# Patient Record
Sex: FEMALE | HISPANIC OR LATINO | Employment: FULL TIME | URBAN - METROPOLITAN AREA
[De-identification: names, ages, dates, MRNs, and addresses within clinical notes are randomized per-mention and may not be internally consistent; named-entity substitution may affect disease eponyms.]

---

## 2017-03-09 ENCOUNTER — HOSPITAL ENCOUNTER (OUTPATIENT)
Dept: LAB | Facility: MEDICAL CENTER | Age: 47
End: 2017-03-09
Attending: INTERNAL MEDICINE
Payer: COMMERCIAL

## 2017-03-09 LAB
25(OH)D3 SERPL-MCNC: 34 NG/ML (ref 30–100)
T4 FREE SERPL-MCNC: 0.99 NG/DL (ref 0.53–1.43)
TSH SERPL DL<=0.005 MIU/L-ACNC: 2.79 UIU/ML (ref 0.3–3.7)
VIT B12 SERPL-MCNC: >1500 PG/ML (ref 211–911)

## 2017-03-09 PROCEDURE — 36415 COLL VENOUS BLD VENIPUNCTURE: CPT

## 2017-03-09 PROCEDURE — 82306 VITAMIN D 25 HYDROXY: CPT

## 2017-03-09 PROCEDURE — 84439 ASSAY OF FREE THYROXINE: CPT

## 2017-03-09 PROCEDURE — 82607 VITAMIN B-12: CPT

## 2017-03-09 PROCEDURE — 84443 ASSAY THYROID STIM HORMONE: CPT

## 2017-04-27 ENCOUNTER — OFFICE VISIT (OUTPATIENT)
Dept: URGENT CARE | Facility: PHYSICIAN GROUP | Age: 47
End: 2017-04-27
Payer: COMMERCIAL

## 2017-04-27 ENCOUNTER — HOSPITAL ENCOUNTER (OUTPATIENT)
Facility: MEDICAL CENTER | Age: 47
End: 2017-04-27
Attending: PHYSICIAN ASSISTANT
Payer: COMMERCIAL

## 2017-04-27 ENCOUNTER — APPOINTMENT (OUTPATIENT)
Dept: RADIOLOGY | Facility: IMAGING CENTER | Age: 47
End: 2017-04-27
Attending: PHYSICIAN ASSISTANT
Payer: COMMERCIAL

## 2017-04-27 VITALS
SYSTOLIC BLOOD PRESSURE: 116 MMHG | RESPIRATION RATE: 16 BRPM | HEIGHT: 68 IN | BODY MASS INDEX: 27.43 KG/M2 | WEIGHT: 181 LBS | OXYGEN SATURATION: 98 % | HEART RATE: 70 BPM | TEMPERATURE: 97.9 F | DIASTOLIC BLOOD PRESSURE: 70 MMHG

## 2017-04-27 DIAGNOSIS — M54.6 ACUTE THORACIC BACK PAIN, UNSPECIFIED BACK PAIN LATERALITY: ICD-10-CM

## 2017-04-27 DIAGNOSIS — R06.02 SHORTNESS OF BREATH: ICD-10-CM

## 2017-04-27 DIAGNOSIS — R13.10 DYSPHAGIA, UNSPECIFIED TYPE: ICD-10-CM

## 2017-04-27 LAB
ALBUMIN SERPL BCP-MCNC: 4.3 G/DL (ref 3.2–4.9)
ALBUMIN/GLOB SERPL: 1.3 G/DL
ALP SERPL-CCNC: 69 U/L (ref 30–99)
ALT SERPL-CCNC: 14 U/L (ref 2–50)
ANION GAP SERPL CALC-SCNC: 8 MMOL/L (ref 0–11.9)
APPEARANCE UR: CLEAR
AST SERPL-CCNC: 16 U/L (ref 12–45)
BASOPHILS # BLD AUTO: 0.6 % (ref 0–1.8)
BASOPHILS # BLD: 0.04 K/UL (ref 0–0.12)
BILIRUB SERPL-MCNC: 0.4 MG/DL (ref 0.1–1.5)
BILIRUB UR STRIP-MCNC: NORMAL MG/DL
BUN SERPL-MCNC: 9 MG/DL (ref 8–22)
CALCIUM SERPL-MCNC: 9.4 MG/DL (ref 8.5–10.5)
CHLORIDE SERPL-SCNC: 105 MMOL/L (ref 96–112)
CO2 SERPL-SCNC: 25 MMOL/L (ref 20–33)
COLOR UR AUTO: YELLOW
CREAT SERPL-MCNC: 0.53 MG/DL (ref 0.5–1.4)
EOSINOPHIL # BLD AUTO: 0.11 K/UL (ref 0–0.51)
EOSINOPHIL NFR BLD: 1.6 % (ref 0–6.9)
ERYTHROCYTE [DISTWIDTH] IN BLOOD BY AUTOMATED COUNT: 39.3 FL (ref 35.9–50)
GFR SERPL CREATININE-BSD FRML MDRD: >60 ML/MIN/1.73 M 2
GLOBULIN SER CALC-MCNC: 3.4 G/DL (ref 1.9–3.5)
GLUCOSE SERPL-MCNC: 91 MG/DL (ref 65–99)
GLUCOSE UR STRIP.AUTO-MCNC: NORMAL MG/DL
HCT VFR BLD AUTO: 41.2 % (ref 37–47)
HGB BLD-MCNC: 13.8 G/DL (ref 12–16)
IMM GRANULOCYTES # BLD AUTO: 0.01 K/UL (ref 0–0.11)
IMM GRANULOCYTES NFR BLD AUTO: 0.1 % (ref 0–0.9)
KETONES UR STRIP.AUTO-MCNC: NORMAL MG/DL
LEUKOCYTE ESTERASE UR QL STRIP.AUTO: NORMAL
LYMPHOCYTES # BLD AUTO: 2.64 K/UL (ref 1–4.8)
LYMPHOCYTES NFR BLD: 37.6 % (ref 22–41)
MCH RBC QN AUTO: 29.7 PG (ref 27–33)
MCHC RBC AUTO-ENTMCNC: 33.5 G/DL (ref 33.6–35)
MCV RBC AUTO: 88.6 FL (ref 81.4–97.8)
MONOCYTES # BLD AUTO: 0.44 K/UL (ref 0–0.85)
MONOCYTES NFR BLD AUTO: 6.3 % (ref 0–13.4)
NEUTROPHILS # BLD AUTO: 3.78 K/UL (ref 2–7.15)
NEUTROPHILS NFR BLD: 53.8 % (ref 44–72)
NITRITE UR QL STRIP.AUTO: NORMAL
NRBC # BLD AUTO: 0 K/UL
NRBC BLD AUTO-RTO: 0 /100 WBC
PH UR STRIP.AUTO: 6.5 [PH] (ref 5–8)
PLATELET # BLD AUTO: 274 K/UL (ref 164–446)
PMV BLD AUTO: 11.2 FL (ref 9–12.9)
POTASSIUM SERPL-SCNC: 3.8 MMOL/L (ref 3.6–5.5)
PROT SERPL-MCNC: 7.7 G/DL (ref 6–8.2)
PROT UR QL STRIP: NORMAL MG/DL
RBC # BLD AUTO: 4.65 M/UL (ref 4.2–5.4)
RBC UR QL AUTO: NORMAL
SODIUM SERPL-SCNC: 138 MMOL/L (ref 135–145)
SP GR UR STRIP.AUTO: 1
TSH SERPL DL<=0.005 MIU/L-ACNC: 0.64 UIU/ML (ref 0.3–3.7)
UROBILINOGEN UR STRIP-MCNC: NORMAL MG/DL
WBC # BLD AUTO: 7 K/UL (ref 4.8–10.8)

## 2017-04-27 PROCEDURE — 80053 COMPREHEN METABOLIC PANEL: CPT

## 2017-04-27 PROCEDURE — 84443 ASSAY THYROID STIM HORMONE: CPT

## 2017-04-27 PROCEDURE — 85025 COMPLETE CBC W/AUTO DIFF WBC: CPT

## 2017-04-27 PROCEDURE — 99000 SPECIMEN HANDLING OFFICE-LAB: CPT | Performed by: PHYSICIAN ASSISTANT

## 2017-04-27 PROCEDURE — 99214 OFFICE O/P EST MOD 30 MIN: CPT | Performed by: PHYSICIAN ASSISTANT

## 2017-04-27 PROCEDURE — 71020 DX-CHEST-2 VIEWS: CPT | Mod: TC | Performed by: PHYSICIAN ASSISTANT

## 2017-04-27 PROCEDURE — 81002 URINALYSIS NONAUTO W/O SCOPE: CPT | Performed by: PHYSICIAN ASSISTANT

## 2017-04-27 PROCEDURE — 93000 ELECTROCARDIOGRAM COMPLETE: CPT | Performed by: PHYSICIAN ASSISTANT

## 2017-04-27 PROCEDURE — 36415 COLL VENOUS BLD VENIPUNCTURE: CPT | Performed by: PHYSICIAN ASSISTANT

## 2017-04-27 RX ORDER — CYCLOBENZAPRINE HCL 10 MG
10 TABLET ORAL 3 TIMES DAILY PRN
Qty: 30 TAB | Refills: 0 | Status: SHIPPED | OUTPATIENT
Start: 2017-04-27 | End: 2017-09-13

## 2017-04-27 ASSESSMENT — ENCOUNTER SYMPTOMS
SPUTUM PRODUCTION: 0
PALPITATIONS: 0
DIZZINESS: 1
SORE THROAT: 0
COUGH: 0
BACK PAIN: 1
HEMOPTYSIS: 0
WHEEZING: 0
FEVER: 0
CHILLS: 0
SHORTNESS OF BREATH: 1

## 2017-04-27 NOTE — Clinical Note
April 27, 2017         Patient: Hortencia Bonilla   YOB: 1970   Date of Visit: 4/27/2017           To Whom it May Concern:    Hortencia Bonilla was seen in my clinic on 4/27/2017. She is recovering from a back injury and would benefit from less strenuous work as she heals.    If you have any questions or concerns, please don't hesitate to call.        Sincerely,           Jeff Sena PA-C  Electronically Signed

## 2017-04-27 NOTE — MR AVS SNAPSHOT
"        Hortencia Bonilla   2017 2:45 PM   Office Visit   MRN: [id number]    Department:  [facility]   Dept Phone:  [phone]    Description:  Female : 1970   Provider:  Jeff Sena PA-C           Reason for Visit     Shortness of Breath light headed, shortness of breath and pt states she feels like she is going to faint, shaky; no chest pain but has had increased [id number] pt states middle of back hurts when breathing      Allergies as of 2017     Allergen Noted Reactions    Bloodless 2011         You were diagnosed with     Acute thoracic back pain, unspecified back pain laterality   [[id number]]       Dysphagia, unspecified type   [[id number]]         Vital Signs     Blood Pressure Pulse Temperature Respirations Height Weight    116/70 mmHg 70 36.6 °C (97.9 °F) 16 1.727 m (5' 8\") 82.101 kg (181 lb)    Body Mass Index Oxygen Saturation Smoking Status             27.53 kg/m2 98% Never Smoker          Basic Information     Date Of Birth Sex Race Ethnicity Preferred Language    1970 Female  or   Origin (Puerto Rican,Greek,Botswanan,North Korean, etc) English      Problem List              ICD-10-CM Priority Class Noted - Resolved    Diarrhea of presumed infectious origin A09 High  2011 - Present      Health Maintenance        Date Due Completion Dates    IMM DTaP/Tdap/Td Vaccine (1 - Tdap) 8/3/1987 ---    PAP SMEAR 10/21/2014 10/21/2011    MAMMOGRAM 2015            Results     POCT Urinalysis      Component Value Standard Range & Units    POC Color yellow Negative    POC Appearance clear Negative    POC Leukocyte Esterase trace Negative    POC Nitrites neg Negative    POC Urobiligen neg Negative (0.2) mg/dL    POC Protein neg Negative mg/dL    POC Urine PH 6.5 5.0 - 8.0    POC Blood neg Negative    POC Specific Gravity 1.000 <1.005 - >1.030    POC Ketones neg Negative mg/dL    POC Biliruben neg Negative mg/dL    POC Glucose neg Negative mg/dL         "                  Current Immunizations     No immunizations on file.      Below and/or attached are the medications your provider expects you to take. Review all of your home medications and newly ordered medications with your provider and/or pharmacist. Follow medication instructions as directed by your provider and/or pharmacist. Please keep your medication list with you and share with your provider. Update the information when medications are discontinued, doses are changed, or new medications (including over-the-counter products) are added; and carry medication information at all times in the event of emergency situations     Allergies:  BLOODLESS - (reactions not documented)               Medications  Valid as of: April 27, 2017 -  5:01 PM    Generic Name Brand Name Tablet Size Instructions for use    Amoxicillin-Pot Clavulanate (Tab) AUGMENTIN 875-125 MG Take 1 Tab by mouth 2 times a day.        Cyclobenzaprine HCl (Tab) FLEXERIL 10 MG Take 1 Tab by mouth 3 times a day as needed for Moderate Pain.        DM-Phenylephrine-Acetaminophen   Take  by mouth.        Omega-3 Fatty Acids   Take  by mouth.        Phenazopyridine HCl (Tab) PYRIDIUM 200 MG Take 1 Tab by mouth 3 times a day as needed.        .                 Medicines prescribed today were sent to:     [id number] - [place] - [address] [place] [address]    Phone: [phone] Fax: [phone]    Open 24 Hours?: No      Medication refill instructions:       If your prescription bottle indicates you have medication refills left, it is not necessary to call your provider’s office. Please contact your pharmacy and they will refill your medication.    If your prescription bottle indicates you do not have any refills left, you may request refills at any time through one of the following ways: The online [employer] system (except Urgent Care), by calling your provider’s office, or by asking your pharmacy to contact your provider’s office  with a refill request. Medication refills are processed only during regular business hours and may not be available until the next business day. Your provider may request additional information or to have a follow-up visit with you prior to refilling your medication.   *Please Note: Medication refills are assigned a new Rx number when refilled electronically. Your pharmacy may indicate that no refills were authorized even though a new prescription for the same medication is available at the pharmacy. Please request the medicine by name with the pharmacy before contacting your provider for a refill.        Your To Do List     Future Labs/Procedures Complete By Expires    CBC WITH DIFFERENTIAL  As directed 4/27/2018    COMP METABOLIC PANEL  As directed 4/27/2018    DX-CHEST-2 VIEWS  As directed 4/27/2018    TSH  As directed 4/27/2018      Referral     A referral request has been sent to our patient care coordination department. Please allow 3-5 business days for us to process this request and contact you either by phone or mail. If you do not hear from us by the 5th business day, please call us at [phone].        Instructions    Disfagia  (Dysphagia)  Los problemas para deglutir se perciben cuando los sólidos y líquidos parecen quedarse adheridos en la garganta en santana pasaje hacia el estómago, o cuando los alimentos tardan demasiado tiempo en llegar al estómago. Otros síntomas (problemas) incluyen la regurgitación (eructos) de la comida, ruidos que provienen de la garganta, molestias en el pecho al tragar, o la sensación de alimentos atorados al deglutir. Cuando la obstrucción de la garganta es completa, puede asociarse a salivación intensa.  CAUSAS  La dificultad para deglutir puede tener muchas causas, y lo que sigue es jose d información generalizada con respecto a los orígenes que puede tener courtney problema. Puede producirse por trastornos musculares. El alimento no puede ser impulsado correctamente hacia el  estómago. Puede priscilla úlceras, tejidos cicatrizales o inflamación (irritación) en el esófago (el tubo que lleva los alimentos desde la boca hasta el estómago) que impiden el pasaje normal hacia el estómago. Entre las causas de la inflamación se incluye la causada por el reflujo de ácido del estómago hacia el esófago. Otras causas pueden ser el virus del herpes simple, cándida (hongos), radiación (celeste en el tratamiento del cáncer) o inflamación por medicamentos que no se hancock ingerido con la cantidad suficiente de líquido para que los arrastre hasta el estómago. Puede priscilla problemas nerviosos, en cuyo yissel no se envían adecuadamente las señales a los músculos del esófago para contraerse y movilizar los alimentos. La acalasia es un trastorno del esófago poco frecuente, en el que se produce jose d falta de coordinación de las contracciones musculares. El globo histérico es un problema relativamente común en mujeres jóvenes, que consiste en la sensación de ojse d obstrucción o dificultad para tragar, kan no se encuentran anormalidades. Courtney trastorno generalmente mejora con el tiempo; es necesario transmitir confianza a la paciente y realizar pruebas para descartar otras causas.  DIAGNÓSTICO  Existen algunas pruebas que ayudarán al profesional que lo asiste a conocer la causa de jose dificultades para deglutir. Entre estas pruebas se incluye un estudio de la deglución utilizando bario, en el que se alvina radiografías mientras se kera un líquido que delinea el recubrimiento del esófago. Si el estómago y el intestino hoffman también se estudian de courtney modo, el procedimiento se denomina examen del tracto digestivo alto. Podrán someterlo a jose d endoscopia en la que el profesional examina la garganta, el esófago, el estómago y el intestino hoffman con un instrumento similar a un pequeño telescopio flexible. También podrán realizarle estudios de motilidad, en los que se mide la efectividad y la coordinación de las contracciones  musculares del esófago.  TRATAMIENTO  Los tratamientos de los problemas de la deglución son muchos y varían desde la administración de medicamentos hasta el tratamiento quirúrgico. El tratamiento implementado depende del tipo de problema que se encuentre. El profesional que lo asiste discutirá con usted los resultados y el tratamiento a seguir. Si los problemas para deglutir son graves a tadeo plazo, podrán ocasionarle: desnutrición, neumonía (porque el alimento se dirige hacia la tráquea y los bronquios, que son los conductos respiratorios) y a un aumento de la posibilidad de sufrir tumores (bultos) en el esófago.  SOLICITE ATENCIÓN MÉDICA DE INMEDIATO SI:  · Un trozo de alimento u otro objeto se estancan en la garganta o en el esófago y no puede movilizarlos.  Document Released: 09/27/2006 Document Revised: 03/11/2013  ExitAdvanced Image Enhancement® Patient Information ©2014 [employer].  Dolor de espalda en adultos  (Back Pain, Adult)  El dolor de espalda es muy frecuente en los adultos. La causa del dolor de espalda es bisi vez peligrosa y el dolor a menudo mejora con el tiempo. Es posible que se desconozca la causa de esta afección. Algunas causas comunes son las siguientes:  · Distensión de los músculos o ligamentos que sostienen la columna vertebral.  · Desgaste (degeneración) de los discos vertebrales.  · Artritis.  · Lesiones directas en la espalda.  En muchas personas, el dolor de espalda es recurrente. [place] bisi vez es peligroso, las personas pueden aprender a manejar esta afección por sí mismas.  INSTRUCCIONES PARA EL CUIDADO EN EL HOGAR  Controle santana dolor de espalda a fin de detectar algún cambio. Las siguientes indicaciones ayudarán a aliviar cualquier molestia que pueda sentir:  · Permanezca activo. Si permanece sentado o de pie en un mismo lugar yovanny mucho tiempo, se tensiona la espalda. No se siente, conduzca o permanezca de pie en un mismo lugar yovanny más de 30 minutos seguidos. Realice caminatas cortas en  superficies mp tan pronto celeste le sea posible. Trate de caminar un poco más de tiempo cada día.  · Yusef ejercicio regularmente celeste se lo haya indicado el médico. El ejercicio ayuda a que santana espalda se cure más rápidamente. También ayuda a prevenir futuras lesiones al mantener los músculos david y flexibles.  · No permanezca en la cama. Si hace reposo más de 1 a 2 días, puede demorar santana recuperación.  · Preste atención a santana cuerpo al inclinarse y levantarse. Las posiciones más cómodas son las que ejercen menos tensión en la espalda en recuperación. Siempre use técnicas apropiadas para levantar objetos, celeste por ejemplo:  ¨ Flexionar las rodillas.  ¨ Mantener la carga cerca del cuerpo.  ¨ No torcerse.  · Encuentre jose d posición cómoda para dormir. Use un colchón firme y recuéstese de costado con las rodillas ligeramente flexionadas. Si se recuesta sobre la espalda, coloque jose d almohada debajo de las rodillas.  · Evite sentir ansiedad o estrés. El estrés aumenta la tensión muscular y puede empeorar el dolor de espalda. Es importante reconocer si se siente ansioso o estresado y aprender maneras de controlarlo, por ejemplo haciendo ejercicio.  · [place] los medicamentos solamente celeste se lo haya indicado el médico. Los medicamentos de venta chidi para aliviar el dolor y la inflamación a menudo son los más eficaces. El médico puede recetarle relajantes musculares. Estos medicamentos ayudan a calmar el dolor de modo que pueda reanudar más rápidamente jose actividades normales y el ejercicio saludable.  · Aplique hielo sobre la shanelle lesionada.  ¨ Ponga el hielo en jose d bolsa plástica.  ¨ Coloque jose d toalla entre la piel y la bolsa de hielo.  ¨ Deje el hielo yovanny 20 minutos, 2 a 3 veces por día, yovanny los primeros 2 o 3 días. Después de eso, puede alternar el hielo y el calor para reducir el dolor y los espasmos.  · Mantenga un peso saludable. El exceso de peso ejerce presión adicional sobre la espalda y hace que  resulte difícil mantener jose d buena postura.  SOLICITE ATENCIÓN MÉDICA SI:  · Siente un dolor que no se adam con reposo o medicamentos.  · Siente mucho dolor que se extiende a las piernas o los glúteos.  · El dolor no mejora en jose d semana.  · Siente dolor por la noche.  · Pierde peso.  · Siente escalofríos o fiebre.  SOLICITE ATENCIÓN MÉDICA DE INMEDIATO SI:   · Tiene nuevos problemas para controlar la vejiga o los intestinos.  · Siente debilidad o adormecimiento inusuales en los brazos o en las piernas.  · Siente náuseas o vómitos.  · Siente dolor abdominal.  · Siente que va a desmayarse.     Esta información no tiene celeste fin reemplazar el consejo del médico. Asegúrese de hacerle al médico cualquier pregunta que tenga.     Document Released: 12/18/2006 Document Revised: 01/08/2016  [employer] Interactive Patient Education ©2016 Elsevier Inc.            Zeta Interactive Access Code: [email or web address]  Expires: 5/27/2017 12:56 PM    Zeta Interactive  A secure, online tool to manage your health information     [employer] Zeta Interactive® is a secure, online tool that connects you to your personalized health information from the privacy of your home -- day or night - making it very easy for you to manage your healthcare. Once the activation process is completed, you can even access your medical information using the Zeta Interactive erica, which is available for free in the [employer]a store or Google Play store.     Z[employer] Interactive provides the following levels of access (as shown below):   My Chart Features   [facility] Doctor [facility]  Specialists [facility]  Urgent  Care [facility]  Primary Care  Doctor   Email your healthcare team securely and privately 24/7 X X X    Manage appointments: schedule your next appointment; view details of past/upcoming appointments X      Request prescription refills. X      View recent personal medical records, including lab and immunizations X X X X   View health record, including health history, allergies, medications X  X X X   Read reports about your outpatient visits, procedures, consult and ER notes X X X X   See your discharge summary, which is a recap of your hospital and/or ER visit that includes your diagnosis, lab results, and care plan. X X       How to register for [employer]:  1. Go to  [email or web address].  2. Click on the Sign Up Now box, which takes you to the New Member Sign Up page. You will need to provide the following information:  a. Enter your PetLove Access Code exactly as it appears at the top of this page. (You will not need to use this code after you’ve completed the sign-up process. If you do not sign up before the expiration date, you must request a new code.)   b. Enter your date of birth.   c. Enter your home email address.   d. Click Submit, and follow the next screen’s instructions.  3. Create a PetLove ID. This will be your PetLove login ID and cannot be changed, so think of one that is secure and easy to remember.  4. Create a PetLove password. You can change your password at any time.  5. Enter your Password Reset Question and Answer. This can be used at a later time if you forget your password.   6. Enter your e-mail address. This allows you to receive e-mail notifications when new information is available in PetLove.  7. Click Sign Up. You can now view your health information.    For assistance activating your PetLove account, call [phone]

## 2017-04-27 NOTE — PATIENT INSTRUCTIONS
Disfagia  (Dysphagia)  Los problemas para deglutir se perciben cuando los sólidos y líquidos parecen quedarse adheridos en la garganta en santana pasaje hacia el estómago, o cuando los alimentos tardan demasiado tiempo en llegar al estómago. Otros síntomas (problemas) incluyen la regurgitación (eructos) de la comida, ruidos que provienen de la garganta, molestias en el pecho al tragar, o la sensación de alimentos atorados al deglutir. Cuando la obstrucción de la garganta es completa, puede asociarse a salivación intensa.  CAUSAS  La dificultad para deglutir puede tener muchas causas, y lo que sigue es jose d información generalizada con respecto a los orígenes que puede tener courtney problema. Puede producirse por trastornos musculares. El alimento no puede ser impulsado correctamente hacia el estómago. Puede priscilla úlceras, tejidos cicatrizales o inflamación (irritación) en el esófago (el tubo que lleva los alimentos desde la boca hasta el estómago) que impiden el pasaje normal hacia el estómago. Entre las causas de la inflamación se incluye la causada por el reflujo de ácido del estómago hacia el esófago. Otras causas pueden ser el virus del herpes simple, cándida (hongos), radiación (celeste en el tratamiento del cáncer) o inflamación por medicamentos que no se hancock ingerido con la cantidad suficiente de líquido para que los arrastre hasta el estómago. Puede priscilla problemas nerviosos, en cuyo yissel no se envían adecuadamente las señales a los músculos del esófago para contraerse y movilizar los alimentos. La acalasia es un trastorno del esófago poco frecuente, en el que se produce jose d falta de coordinación de las contracciones musculares. El globo histérico es un problema relativamente común en mujeres jóvenes, que consiste en la sensación de jose d obstrucción o dificultad para tragar, kan no se encuentran anormalidades. [place] trastorno generalmente mejora con el tiempo; es necesario transmitir confianza a la paciente y realizar  pruebas para descartar otras causas.  DIAGNÓSTICO  Existen algunas pruebas que ayudarán al profesional que lo asiste a conocer la causa de jose dificultades para deglutir. Entre estas pruebas se incluye un estudio de la deglución utilizando bario, en el que se alvina radiografías mientras se kera un líquido que delinea el recubrimiento del esófago. Si el estómago y el intestino hoffman también se estudian de courtney modo, el procedimiento se denomina examen del tracto digestivo alto. Podrán someterlo a jose d endoscopia en la que el profesional examina la garganta, el esófago, el estómago y el intestino hoffman con un instrumento similar a un pequeño telescopio flexible. También podrán realizarle estudios de motilidad, en los que se mide la efectividad y la coordinación de las contracciones musculares del esófago.  TRATAMIENTO  Los tratamientos de los problemas de la deglución son muchos y varían desde la administración de medicamentos hasta el tratamiento quirúrgico. El tratamiento implementado depende del tipo de problema que se encuentre. El profesional que lo asiste discutirá con usted los resultados y el tratamiento a seguir. Si los problemas para deglutir son graves a tadeo plazo, podrán ocasionarle: desnutrición, neumonía (porque el alimento se dirige hacia la tráquea y los bronquios, que son los conductos respiratorios) y a un aumento de la posibilidad de sufrir tumores (bultos) en el esófago.  SOLICITE ATENCIÓN MÉDICA DE INMEDIATO SI:  · Un trozo de alimento u otro objeto se estancan en la garganta o en el esófago y no puede movilizarlos.  Document Released: 09/27/2006 Document Revised: 03/11/2013  ExitCare® Patient Information ©2014 [employer], [place].  Dolor de espalda en adultos  (Back Pain, Adult)  El dolor de espalda es muy frecuente en los adultos. La causa del dolor de espalda es bisi vez peligrosa y el dolor a menudo mejora con el tiempo. Es posible que se desconozca la causa de esta afección. Algunas causas  comunes son las siguientes:  · Distensión de los músculos o ligamentos que sostienen la columna vertebral.  · Desgaste (degeneración) de los discos vertebrales.  · Artritis.  · Lesiones directas en la espalda.  En muchas personas, el dolor de espalda es recurrente. [place] bisi vez es peligroso, las personas pueden aprender a manejar esta afección por sí mismas.  INSTRUCCIONES PARA EL CUIDADO EN EL HOGAR  Controle santana dolor de espalda a fin de detectar algún cambio. Las siguientes indicaciones ayudarán a aliviar cualquier molestia que pueda sentir:  · Permanezca activo. Si permanece sentado o de pie en un mismo lugar yovanny mucho tiempo, se tensiona la espalda. No se siente, conduzca o permanezca de pie en un mismo lugar yovanny más de 30 minutos seguidos. Realice caminatas cortas en superficies mp tan pronto celeste le sea posible. Trate de caminar un poco más de tiempo cada día.  · Yusef ejercicio regularmente celeste se lo haya indicado el médico. El ejercicio ayuda a que santana espalda se cure más rápidamente. También ayuda a prevenir futuras lesiones al mantener los músculos david y flexibles.  · No permanezca en la cama. Si hace reposo más de 1 a 2 días, puede demorar santana recuperación.  · Preste atención a santana cuerpo al inclinarse y levantarse. Las posiciones más cómodas son las que ejercen menos tensión en la espalda en recuperación. Siempre use técnicas apropiadas para levantar objetos, celeste por ejemplo:  ¨ Flexionar las rodillas.  ¨ Mantener la carga cerca del cuerpo.  ¨ No torcerse.  · Encuentre jose d posición cómoda para dormir. Use un colchón firme y recuéstese de costado con las rodillas ligeramente flexionadas. Si se recuesta sobre la espalda, coloque jose d almohada debajo de las rodillas.  · Evite sentir ansiedad o estrés. El estrés aumenta la tensión muscular y puede empeorar el dolor de espalda. Es importante reconocer si se siente ansioso o estresado y aprender maneras de controlarlo, por ejemplo haciendo  ejercicio.  · [place] los medicamentos solamente celeste se lo haya indicado el médico. Los medicamentos de venta chidi para aliviar el dolor y la inflamación a menudo son los más eficaces. El médico puede recetarle relajantes musculares. Estos medicamentos ayudan a calmar el dolor de modo que pueda reanudar más rápidamente jose actividades normales y el ejercicio saludable.  · Aplique hielo sobre la shanelle lesionada.  ¨ Ponga el hielo en jose d bolsa plástica.  ¨ Coloque jose d toalla entre la piel y la bolsa de hielo.  ¨ Deje el hielo yovanny 20 minutos, 2 a 3 veces por día, yovanny los primeros 2 o 3 días. Después de eso, puede alternar el hielo y el calor para reducir el dolor y los espasmos.  · Mantenga un peso saludable. El exceso de peso ejerce presión adicional sobre la espalda y hace que resulte difícil mantener jose d buena postura.  SOLICITE ATENCIÓN MÉDICA SI:  · Siente un dolor que no se adam con reposo o medicamentos.  · Siente mucho dolor que se extiende a las piernas o los glúteos.  · El dolor no mejora en jose d semana.  · Siente dolor por la noche.  · Pierde peso.  · Siente escalofríos o fiebre.  SOLICITE ATENCIÓN MÉDICA DE INMEDIATO SI:   · Tiene nuevos problemas para controlar la vejiga o los intestinos.  · Siente debilidad o adormecimiento inusuales en los brazos o en las piernas.  · Siente náuseas o vómitos.  · Siente dolor abdominal.  · Siente que va a desmayarse.     Esta información no tiene celeste fin reemplazar el consejo del médico. Asegúrese de hacerle al médico cualquier pregunta que tenga.     Document Released: 12/18/2006 Document Revised: 01/08/2016  Elsevier Interactive Patient Education ©2016 Elsevier Inc.

## 2017-04-28 NOTE — PROGRESS NOTES
Subjective:      Hortencia Bonilla is a 46 y.o. female who presents with Shortness of Breath            Shortness of Breath  This is a new problem. The current episode started yesterday. The problem occurs intermittently. The problem has been unchanged. Pertinent negatives include no chest pain, ear pain, fever, hemoptysis, sore throat, sputum production or wheezing. The symptoms are aggravated by lying flat (deep breaths). Associated symptoms comments: Back pain  . The patient has no known risk factors for DVT/PE. She has tried nothing for the symptoms.       Review of Systems   Constitutional: Positive for malaise/fatigue. Negative for fever and chills.   HENT: Negative for congestion, ear pain and sore throat.    Respiratory: Positive for shortness of breath. Negative for cough, hemoptysis, sputum production and wheezing.    Cardiovascular: Negative for chest pain and palpitations.   Gastrointestinal:        Difficult swallowing.   Musculoskeletal: Positive for back pain.   Neurological: Positive for dizziness.     All other systems reviewed and are negative.  PMH:  has a past medical history of Bipolar disorder (CMS-[facility]) and Thyroid nodule.  MEDS:   Current outpatient prescriptions:   •  cyclobenzaprine (FLEXERIL) 10 MG Tab, Take 1 Tab by mouth 3 times a day as needed for Moderate Pain., Disp: 30 Tab, Rfl: 0  •  DM-Phenylephrine-Acetaminophen (VICKS DAYQUIL MULTI-SYMPTOM PO), Take  by mouth., Disp: , Rfl:   •  amoxicillin-clavulanate (AUGMENTIN) 875-125 MG Tab, Take 1 Tab by mouth 2 times a day., Disp: 20 Tab, Rfl: 0  •  Omega-3 Fatty Acids (OMEGA 3 PO), Take  by mouth., Disp: , Rfl:   •  phenazopyridine (PYRIDIUM) 200 MG TABS, Take 1 Tab by mouth 3 times a day as needed. (Patient not taking: Reported on 5/21/2016), Disp: 6 Tab, Rfl: 0  ALLERGIES:   Allergies   Allergen Reactions   • Bloodless      SURGHX:   Past Surgical History   Procedure Laterality Date   • Primary c section       x 1     SOCHX:  reports that  "she has never smoked. She has never used smokeless tobacco. She reports that she does not drink alcohol or use illicit drugs.  FH: Family history was reviewed, no pertinent findings to report  Medications, Allergies, and current problem list reviewed today in Epic       Objective:     /70 mmHg  Pulse 70  Temp(Src) 36.6 °C (97.9 °F)  Resp 16  Ht 1.727 m (5' 8\")  Wt 82.101 kg (181 lb)  BMI 27.53 kg/m2  SpO2 98%     Physical Exam   Constitutional: She is oriented to person, place, and time. She appears well-developed and well-nourished.   HENT:   Head: Normocephalic and atraumatic.   Right Ear: Hearing, tympanic membrane, external ear and ear canal normal.   Left Ear: Hearing, tympanic membrane, external ear and ear canal normal.   Nose: Nose normal.   Mouth/Throat: Uvula is midline, oropharynx is clear and moist and mucous membranes are normal.   Neck: Trachea normal and normal range of motion. Neck supple. No tracheal tenderness present. No tracheal deviation present. No thyromegaly present.       Cardiovascular: Normal rate, regular rhythm and normal heart sounds.  Exam reveals no gallop and no friction rub.    No murmur heard.  Pulmonary/Chest: Effort normal and breath sounds normal. No accessory muscle usage. No apnea, no tachypnea and no bradypnea. No respiratory distress. She has no decreased breath sounds. She has no wheezes. She has no rhonchi. She has no rales. She exhibits no tenderness.   Musculoskeletal: She exhibits tenderness.   Pain to palpation of the back.   Neurological: She is alert and oriented to person, place, and time.   Skin: Skin is warm and dry.   Psychiatric: She has a normal mood and affect. Her behavior is normal. Judgment and thought content normal.   Vitals reviewed.           4/27/2017 3:21 PM    HISTORY/REASON FOR EXAM:  Lightheaded    TECHNIQUE/EXAM DESCRIPTION:  PA and lateral views of the chest.    COMPARISON:  None    FINDINGS:    The heart is not enlarged. No focal " consolidation, pleural effusion or pneumothorax is identified.  Costophrenic angles are sharp. Degenerative changes are seen in the spine.             Impression        No acute cardiopulmonary process is identified.              Assessment/Plan:   Patient is a 46-year-old female who presents with shortness of breath, back pain, lightheadedness for 2 days. She states that when she takes a deep breath she has pain in her back. She feels shaky. She denies chest pain. This pain came on suddenly without injury. She also has difficulty swallowing which is a chronic problem that has been evaluated before which was normal. She has a nodule on her thyroid that has been unchanged for 12 years also has been evaluated before which has been shown to be benign. Vital signs are normal. There is some pain to palpation of the thoracic back region. Lungs are clear to auscultation. X-ray is negative, EKG is normal, urine analysis is negative.  Symptoms may be muscular in etiology.  We will get labs to rule out any abnormalities and refer to GI for dysphagia.  1. Acute thoracic back pain, unspecified back pain laterality    - POCT Urinalysis  - EKG  - DX-CHEST-2 VIEWS; Future  - COMP METABOLIC PANEL; Future  - TSH; Future  - CBC WITH DIFFERENTIAL; Future  - cyclobenzaprine (FLEXERIL) 10 MG Tab; Take 1 Tab by mouth 3 times a day as needed for Moderate Pain.  Dispense: 30 Tab; Refill: 0    2. Dysphagia, unspecified type    - REFERRAL TO GASTROENTEROLOGY    Differential diagnosis, natural history, supportive care, and indications for immediate follow-up discussed at length.   Follow-up with primary care provider within 4-5 days, emergency room precautions discussed.  Patient and/or family appears understanding of information.

## 2017-09-13 ENCOUNTER — HOSPITAL ENCOUNTER (OUTPATIENT)
Dept: LAB | Facility: MEDICAL CENTER | Age: 47
End: 2017-09-13
Attending: PHYSICIAN ASSISTANT
Payer: COMMERCIAL

## 2017-09-13 ENCOUNTER — OFFICE VISIT (OUTPATIENT)
Dept: MEDICAL GROUP | Facility: MEDICAL CENTER | Age: 47
End: 2017-09-13
Payer: COMMERCIAL

## 2017-09-13 ENCOUNTER — HOSPITAL ENCOUNTER (OUTPATIENT)
Dept: RADIOLOGY | Facility: MEDICAL CENTER | Age: 47
End: 2017-09-13
Attending: PHYSICIAN ASSISTANT
Payer: COMMERCIAL

## 2017-09-13 VITALS
HEART RATE: 63 BPM | OXYGEN SATURATION: 98 % | TEMPERATURE: 97.3 F | HEIGHT: 69 IN | SYSTOLIC BLOOD PRESSURE: 122 MMHG | WEIGHT: 178 LBS | DIASTOLIC BLOOD PRESSURE: 70 MMHG | RESPIRATION RATE: 18 BRPM | BODY MASS INDEX: 26.36 KG/M2

## 2017-09-13 DIAGNOSIS — R25.2 CRAMP OF BOTH LOWER EXTREMITIES: ICD-10-CM

## 2017-09-13 LAB
ANION GAP SERPL CALC-SCNC: 7 MMOL/L (ref 0–11.9)
BUN SERPL-MCNC: 14 MG/DL (ref 8–22)
CALCIUM SERPL-MCNC: 9.4 MG/DL (ref 8.5–10.5)
CHLORIDE SERPL-SCNC: 102 MMOL/L (ref 96–112)
CO2 SERPL-SCNC: 29 MMOL/L (ref 20–33)
CREAT SERPL-MCNC: 0.67 MG/DL (ref 0.5–1.4)
ERYTHROCYTE [SEDIMENTATION RATE] IN BLOOD BY WESTERGREN METHOD: 39 MM/HOUR (ref 0–20)
GFR SERPL CREATININE-BSD FRML MDRD: >60 ML/MIN/1.73 M 2
GLUCOSE SERPL-MCNC: 71 MG/DL (ref 65–99)
POTASSIUM SERPL-SCNC: 3.5 MMOL/L (ref 3.6–5.5)
SODIUM SERPL-SCNC: 138 MMOL/L (ref 135–145)

## 2017-09-13 PROCEDURE — 72110 X-RAY EXAM L-2 SPINE 4/>VWS: CPT

## 2017-09-13 PROCEDURE — 86038 ANTINUCLEAR ANTIBODIES: CPT

## 2017-09-13 PROCEDURE — 86255 FLUORESCENT ANTIBODY SCREEN: CPT

## 2017-09-13 PROCEDURE — 80048 BASIC METABOLIC PNL TOTAL CA: CPT

## 2017-09-13 PROCEDURE — 36415 COLL VENOUS BLD VENIPUNCTURE: CPT

## 2017-09-13 PROCEDURE — 99214 OFFICE O/P EST MOD 30 MIN: CPT | Performed by: PHYSICIAN ASSISTANT

## 2017-09-13 PROCEDURE — 85652 RBC SED RATE AUTOMATED: CPT

## 2017-09-13 RX ORDER — CHOLECALCIFEROL (VITAMIN D3) 50 MCG
4000 TABLET ORAL DAILY
COMMUNITY

## 2017-09-13 ASSESSMENT — PATIENT HEALTH QUESTIONNAIRE - PHQ9: CLINICAL INTERPRETATION OF PHQ2 SCORE: 0

## 2017-09-13 NOTE — PROGRESS NOTES
Chief Complaint   Patient presents with   • Other     Bilat leg cramps x 1mo.       HISTORY OF THE PRESENT ILLNESS: This is a 47 y.o. female new patient to our practice. This pleasant patient is here today to discuss leg cramping. Primarily Algerian speaking. Translation with Nohemy Cheng MA.    Cramp of both lower extremities  One month. Both legs. Sometimes one or the other. Next day hard to work because of the pain. When she walks the tibia bone hurts. Cramping happens 4 times at night, every night, then pain the next day. Only once happens during the day. When she lays down and tries to turn that is when it happens. No reason she can think of. No change in exercise or work. 6-7 months ago went on a new diet with no dairy and tropical fruits - everything except berries and apples. Does have hx of mid back pain and low back pain. When she lays down legs feel heavy, not really numbness but yes weakness. Does feel the muscles are a little smaller. Also for months when she puts pressure on the back of the legs it hurts, feels lumps with pain. Wonders about the circulation. Has noticed some decrease in hair growth in the lower legs.      Past Medical History:   Diagnosis Date   • Bipolar disorder (CMS-HCC)    • Thyroid nodule        Past Surgical History:   Procedure Laterality Date   • PRIMARY C SECTION      x 1       Family Status   Relation Status   • Mother Alive   • Father Other   • Sister Alive   • Brother Alive     Family History   Problem Relation Age of Onset   • Other Brother      OCD ([place]) half brother       Social History   Substance Use Topics   • Smoking status: Never Smoker   • Smokeless tobacco: Never Used   • Alcohol use No       Allergies: Bloodless    Current Outpatient Prescriptions Ordered in [facility]   Medication Sig Dispense Refill   • Cyanocobalamin (VITAMIN B12 PO) Take 2 Tabs by mouth every day.     • Cholecalciferol (VITAMIN D) 2000 UNIT Tab Take 4,000 Units by mouth every day.     • MAGNESIUM  "PO Take 1-2 Tabs by mouth every day.     • Omega-3 Fatty Acids (OMEGA 3 PO) Take  by mouth.       No current Epic-ordered facility-administered medications on file.        Review of Systems   Constitutional: Negative for fever, chills, weight loss and malaise/fatigue.   HENT: Negative for ear pain, nosebleeds, congestion, sore throat and neck pain.    Eyes: Negative for blurred vision.   Respiratory: Negative for cough, sputum production, shortness of breath and wheezing.    Cardiovascular: Negative for chest pain, palpitations, orthopnea and leg swelling.   Gastrointestinal: Negative for heartburn, nausea, vomiting and abdominal pain.   Genitourinary: Negative for dysuria, urgency and frequency.    Skin: Negative for rash and itching.   Neurological: Negative for dizziness, tingling, tremors, sensory change, focal weakness and headaches.   Endo/Heme/Allergies: Does not bruise/bleed easily.   Psychiatric/Behavioral: Negative for depression, anxiety, or memory loss.     All other systems reviewed and are negative except as in HPI.    Exam: Blood pressure 122/70, pulse 63, temperature 36.3 °C (97.3 °F), resp. rate 18, height 1.753 m (5' 9\"), weight 80.7 kg (178 lb), SpO2 98 %.  General: Normal appearing. No distress.  Pulmonary: Clear to ausculation.  Normal effort. No rales, ronchi, or wheezing.  Cardiovascular: Regular rate and rhythm without murmur. Carotid and radial pulses are intact and equal bilaterally. Pedal pulses intact bilat  Neurologic: Grossly nonfocal  Skin: Warm and dry.  No obvious lesions. Lower legs with sparse hair growth.  Musculoskeletal: Normal gait. No extremity cyanosis, clubbing, or edema.  Psych: Normal mood and affect. Alert and oriented x3. Judgment and insight is normal.      Assessment/Plan  1. Cramp of both lower extremities  BASIC METABOLIC PANEL    ANTI-NUCLEAR ANTIBODY SERUM    ANTI-NEUTROPHIL CYTOPLASMIC AB W/RFLX    WESTERGREN SED RATE    DX-LUMBAR SPINE-4+ VIEWS     Electrolyte " imbalance vs lumbar spine disorder vs circulatory disorder vs autoimmune disorder  Labs and xrays  Consider MRI and consider ultrasound of vasculature bilat legs  Encouraged hydration, multivitamin

## 2017-09-13 NOTE — LETTER
[facility]  Milady Hancock P.A.-C.  [address]  [facility] [address]  Fax: [phone]   Authorization for Release/Disclosure of   Protected Health Information   Name: HORTENCIA BONILLA : 1970 SSN: [id number]   Address:   [address] [place] [id number] Phone:    [phone] (home)    I authorize the entity listed below to release/disclose the PHI below to:   [facility]/Milady Hancock P.A.-C. and Milady Hancock P.A.-C.   Provider or Entity Name:     Address   City, State, [facility]   Phone:[phone]    Fax:[phone]   Reason for request: continuity of care   Information to be released:    [  ] LAST COLONOSCOPY,  including any PATH REPORT and follow-up  [  ] LAST FIT/COLOGUARD RESULT [  ] LAST DEXA  [  ] LAST MAMMOGRAM  [  ] LAST PAP  [  ] LAST LABS [  ] RETINA EXAM REPORT  [  ] IMMUNIZATION RECORDS  [  ] Release all info      [  ] Check here and initial the line next to each item to release ALL health information INCLUDING  _____ Care and treatment for drug and / or alcohol abuse  _____ HIV testing, infection status, or AIDS  _____ Genetic Testing    DATES OF SERVICE OR TIME PERIOD TO BE DISCLOSED: _____________  I understand and acknowledge that:  * This Authorization may be revoked at any time by you in writing, except if your health information has already been used or disclosed.  * Your health information that will be used or disclosed as a result of you signing this authorization could be re-disclosed by the recipient. If this occurs, your re-disclosed health information may no longer be protected by State or Federal laws.  * You may refuse to sign this Authorization. Your refusal will not affect your ability to obtain treatment.  * This Authorization becomes effective upon signing and will  on (date) __________.      If no date is indicated, this Authorization will  one (1) year from the signature date.    Name: Hortencia Bonilla    Signature:   Date:     2017       PLEASE  FAX REQUESTED RECORDS BACK TO: [phone]

## 2017-09-13 NOTE — ASSESSMENT & PLAN NOTE
One month. Both legs. Sometimes one or the other. Next day hard to work because of the pain. When she walks the tibia bone hurts. Cramping happens 4 times at night, every night, then pain the next day. Only once happens during the day. When she lays down and tries to turn that is when it happens. No reason she can think of. No change in exercise or work. 6-7 months ago went on a new diet with no dairy and tropical fruits - everything except berries and apples. Does have hx of mid back pain and low back pain. When she lays down legs feel heavy, not really numbness but yes weakness. Does feel the muscles are a little smaller. Also for months when she puts pressure on the back of the legs it hurts, feels lumps with pain. Wonders about the circulation. Has noticed some decrease in hair growth in the lower legs.

## 2017-09-15 LAB
ANCA IGG TITR SER IF: NORMAL {TITER}
NUCLEAR IGG SER QL IA: NORMAL

## 2017-09-21 ENCOUNTER — OFFICE VISIT (OUTPATIENT)
Dept: MEDICAL GROUP | Facility: MEDICAL CENTER | Age: 47
End: 2017-09-21
Payer: COMMERCIAL

## 2017-09-21 VITALS
HEIGHT: 69 IN | BODY MASS INDEX: 26.54 KG/M2 | TEMPERATURE: 97.7 F | WEIGHT: 179.2 LBS | RESPIRATION RATE: 14 BRPM | OXYGEN SATURATION: 100 % | DIASTOLIC BLOOD PRESSURE: 72 MMHG | SYSTOLIC BLOOD PRESSURE: 116 MMHG | HEART RATE: 69 BPM

## 2017-09-21 DIAGNOSIS — R29.898 LEG HEAVINESS: ICD-10-CM

## 2017-09-21 DIAGNOSIS — R25.2 CRAMP OF BOTH LOWER EXTREMITIES: ICD-10-CM

## 2017-09-21 DIAGNOSIS — M47.816 OSTEOARTHRITIS OF LUMBAR SPINE, UNSPECIFIED SPINAL OSTEOARTHRITIS COMPLICATION STATUS: ICD-10-CM

## 2017-09-21 DIAGNOSIS — R22.1 MASS OF LEFT SIDE OF NECK: ICD-10-CM

## 2017-09-21 PROCEDURE — 99214 OFFICE O/P EST MOD 30 MIN: CPT | Performed by: PHYSICIAN ASSISTANT

## 2017-09-21 RX ORDER — IBUPROFEN 400 MG/1
400 TABLET ORAL EVERY 8 HOURS PRN
Qty: 30 TAB | Refills: 0 | Status: SHIPPED | OUTPATIENT
Start: 2017-09-21 | End: 2018-06-20

## 2017-09-21 NOTE — LETTER
September 21, 2017        Re: Hortencia Bonilla        Patient is undergoing evaluation in our office for back pain and leg cramping. Please allow her to work with restrictions including no lifting over 10 pounds, giving her more time to complete tasks for the next 4 weeks. She is having tests to look at the cause of this pain.          Thank you for your time,            Milady Hancock PA-C

## 2017-09-21 NOTE — PROGRESS NOTES
Subjective:   Hortencia Bonilla is a 47 y.o. female here today for back pain, leg cramps, lump in neck    Mass of left side of neck  Swelling under chin for about 2 months. Maybe growing a little. Not really painful. No recent infections in ears, nose or throat, skin.    Degenerative joint disease (DJD) of lumbar spine  TECHNIQUE/ EXAM DESCRIPTION AND NUMBER OF VIEWS: 5 views of the lumbar spine.    COMPARISON: None.    FINDINGS:  Vertebral body height is well maintained. There is no evidence of fracture. There is convex right scoliotic curvature. No abnormal motion with flexion and extension. There is mild decreased disc height and endplate spurring at multiple levels. There is   multilevel facet degeneration.   Impression       1.  Multilevel degenerative disc disease and facet degeneration.    2.  No abnormal motion with flexion and extension.   Reading Provider Reading Date   Abimael Raines M.D. Sep 13, 2017     Still having back pain. Still getting leg cramping, heaviness. No numbness or tingling. Works in manufacturing and lifts heavy objects which she feels makes the problem worse. Not taking any meds.    Cramp of both lower extremities  Potassium was slightly low on recent labs. Will take multivitamin and eat bananas.     Labs reviewed in office with patient    Current medicines (including changes today)  Current Outpatient Prescriptions   Medication Sig Dispense Refill   • ibuprofen (MOTRIN) 400 MG Tab Take 1 Tab by mouth every 8 hours as needed for Moderate Pain. 30 Tab 0   • Cyanocobalamin (VITAMIN B12 PO) Take 2 Tabs by mouth every day.     • Cholecalciferol (VITAMIN D) 2000 UNIT Tab Take 4,000 Units by mouth every day.     • MAGNESIUM PO Take 1-2 Tabs by mouth every day.     • Omega-3 Fatty Acids (OMEGA 3 PO) Take  by mouth.       No current facility-administered medications for this visit.      She  has a past medical history of Bipolar disorder (CMS-[facility]) and Thyroid nodule.    ROS   No fever/chills. No  "weight change. No headache/dizziness. No focal weakness. No sore throat, nasal congestion, ear pain. No chest pain, no shortness of breath, difficulty breathing. No n/v/d/c or abdominal pain. No urinary complaint. No rash or skin lesion.        Objective:     Blood pressure 116/72, pulse 69, temperature 36.5 °C (97.7 °F), resp. rate 14, height 1.753 m (5' 9\"), weight 81.3 kg (179 lb 3.2 oz), SpO2 100 %. Body mass index is 26.46 kg/m².   Physical Exam:  Constitutional: WDWN, NAD  Skin: Warm, dry, good turgor, no rashes in visible areas.  Neck: Trachea midline,no thyromegaly. There is a nodule, ?lymph node, submandibular left side, mobile  Abdomen: Soft, non-tender, no masses, no hepatosplenomegaly.  Psych: Alert and oriented x3, normal affect and mood.        Assessment and Plan:   The following treatment plan was discussed    1. Osteoarthritis of lumbar spine, unspecified spinal osteoarthritis complication status    - MR-LUMBAR SPINE-W/O; Future  - ibuprofen (MOTRIN) 400 MG Tab; Take 1 Tab by mouth every 8 hours as needed for Moderate Pain.  Dispense: 30 Tab; Refill: 0    2. Mass of left side of neck    - US-SOFT TISSUES OF HEAD - NECK; Future    3. Cramp of both lower extremities    - MR-LUMBAR SPINE-W/O; Future    4. Leg heaviness    - MR-LUMBAR SPINE-W/O; Future      Followup: will call with imaging results. May need ENT and spine referral         "

## 2017-09-26 ENCOUNTER — TELEPHONE (OUTPATIENT)
Dept: MEDICAL GROUP | Facility: MEDICAL CENTER | Age: 47
End: 2017-09-26

## 2017-09-26 NOTE — LETTER
September 27, 2017        RE: Hortencia Bonilla        Patient is undergoing evaluation in our office for back pain and leg cramping. Please allow her to work with restrictions including no lifting over 30 pounds, giving her more time to complete tasks for the next 4 weeks. She is having tests to look at the cause of this pain.             Thank you for your time,            Milady Hancock PA-C

## 2017-09-26 NOTE — TELEPHONE ENCOUNTER
"Pt stop by yesterday due to:  1) Her employer did not have any job duties based on the weight limit that was stated on the prev work restrictions letter \"May not lift more than 10lb at a time\".    Pt. Hoped to get a new letter indicating that she may lift up to 30 lb without restrictions.  2) Pt. Was scheduled for an enclosed MRI and is extremely claustrophobic.    I called the radiology dept and they no longer have an open MRI. They suggested sedation in which Milady needs to order it, they will provide pt with the med either valium or xanax however pt will be awake and able to answer questions. Pt.staed that she cannot be awake under any circumstances. The radiology dept. Does not provides full sedation.    Lyndsey, please advise....  "

## 2017-09-27 NOTE — TELEPHONE ENCOUNTER
Ok, I printed a new letter. As far as the MRI is is up to her. We could try [facility] as see if they have open MRI. I know she was concerned about getting to the cause of her symptoms which is why I recommend the MRI. Thanks! Milady Hancock PA-C

## 2017-10-03 ENCOUNTER — HOSPITAL ENCOUNTER (OUTPATIENT)
Dept: RADIOLOGY | Facility: MEDICAL CENTER | Age: 47
End: 2017-10-03
Attending: PHYSICIAN ASSISTANT
Payer: COMMERCIAL

## 2017-10-03 DIAGNOSIS — R22.1 MASS OF LEFT SIDE OF NECK: ICD-10-CM

## 2017-10-03 DIAGNOSIS — M47.816 OSTEOARTHRITIS OF LUMBAR SPINE, UNSPECIFIED SPINAL OSTEOARTHRITIS COMPLICATION STATUS: ICD-10-CM

## 2017-10-03 DIAGNOSIS — R29.898 LEG HEAVINESS: ICD-10-CM

## 2017-10-03 DIAGNOSIS — R25.2 CRAMP OF BOTH LOWER EXTREMITIES: ICD-10-CM

## 2017-10-03 PROCEDURE — 76536 US EXAM OF HEAD AND NECK: CPT

## 2017-10-03 PROCEDURE — 72148 MRI LUMBAR SPINE W/O DYE: CPT

## 2017-10-06 ENCOUNTER — TELEPHONE (OUTPATIENT)
Dept: MEDICAL GROUP | Facility: MEDICAL CENTER | Age: 47
End: 2017-10-06

## 2017-10-06 DIAGNOSIS — M47.816 OSTEOARTHRITIS OF LUMBAR SPINE, UNSPECIFIED SPINAL OSTEOARTHRITIS COMPLICATION STATUS: ICD-10-CM

## 2017-10-06 DIAGNOSIS — R22.1 MASS OF LEFT SIDE OF NECK: ICD-10-CM

## 2017-10-06 DIAGNOSIS — R29.898 LEG HEAVINESS: ICD-10-CM

## 2017-10-06 DIAGNOSIS — R25.2 CRAMP OF BOTH LOWER EXTREMITIES: ICD-10-CM

## 2017-10-06 NOTE — TELEPHONE ENCOUNTER
Left pt a mess to call back reg test results or to stop by M,W,Th or Friday before 4pm. And ask for Nohemy

## 2017-10-06 NOTE — TELEPHONE ENCOUNTER
----- Message from Milady Hancock P.A.-C. sent at 10/6/2017  3:10 PM PDT -----  Please call patient. Monegasque speaking. There is some abnormality of her lumbar spine. I suggest she see a spine specialist to discuss. Not a surgical problem, likely just physical therapy. If she would like to take medication to help with pain I recommend ibuprofen 400mg every 8 hours with food and water. I will place referral. Thanks! Milady Hancock PA-C  -------------------------------------------------------------------------------------------------------------------------------------------------------------------------------  Notes Recorded by Milady Hancock P.A.-C. on 10/6/2017 at 3:15 PM PDT  Please call patient. She is Nepali speaking. Her thyroid ultrasound shows the thyroid nodule is slightly larger than the last time. Has she had a biopsy of it before? If not I will send her to ENT for biopsy. Please let me know.  Thanks!  Milady Hancock PA-C

## 2017-10-11 NOTE — TELEPHONE ENCOUNTER
I informed pt and she verbalized understanding. I informed pt reg our referrals process and asked her to come by if she has any further questions.   Milady, I pended the ENT referral; would ya pz complete it if ok?  Nohemy Novak :-)

## 2017-10-23 ENCOUNTER — OFFICE VISIT (OUTPATIENT)
Dept: MEDICAL GROUP | Facility: MEDICAL CENTER | Age: 47
End: 2017-10-23
Payer: COMMERCIAL

## 2017-10-23 VITALS
BODY MASS INDEX: 27.55 KG/M2 | TEMPERATURE: 97.6 F | WEIGHT: 186 LBS | DIASTOLIC BLOOD PRESSURE: 72 MMHG | SYSTOLIC BLOOD PRESSURE: 108 MMHG | HEART RATE: 68 BPM | OXYGEN SATURATION: 97 % | RESPIRATION RATE: 16 BRPM | HEIGHT: 69 IN

## 2017-10-23 DIAGNOSIS — R63.5 WEIGHT GAIN: ICD-10-CM

## 2017-10-23 PROCEDURE — 99214 OFFICE O/P EST MOD 30 MIN: CPT | Performed by: PHYSICIAN ASSISTANT

## 2017-10-23 RX ORDER — PHENTERMINE HYDROCHLORIDE 30 MG/1
30 CAPSULE ORAL EVERY MORNING
Qty: 30 CAP | Refills: 0 | Status: SHIPPED | OUTPATIENT
Start: 2017-12-23 | End: 2018-06-20

## 2017-10-23 RX ORDER — PHENTERMINE HYDROCHLORIDE 30 MG/1
30 CAPSULE ORAL EVERY MORNING
Qty: 30 CAP | Refills: 0 | Status: SHIPPED | OUTPATIENT
Start: 2017-10-23 | End: 2018-06-20

## 2017-10-23 RX ORDER — PHENTERMINE HYDROCHLORIDE 30 MG/1
30 CAPSULE ORAL EVERY MORNING
Qty: 30 CAP | Refills: 0 | Status: SHIPPED | OUTPATIENT
Start: 2017-11-23 | End: 2018-06-20

## 2017-10-23 NOTE — ASSESSMENT & PLAN NOTE
Has been on phentermine in the past. Would like to try this medication again. Dr. Hossein Pemberton. It did help in the past. Has been out for 3 months. 3-4 months the last time. Lost 30 pounds. No side effects. No anxiety, palpitations, nausea. Makes her less hungry. Was on 30mg the entire time.

## 2017-10-23 NOTE — PATIENT INSTRUCTIONS
Phentermine tablets or capsules  ¿Qué es courtney medicamento?  La FENTERMINA reduce santana apetito. Si la combina con jose d dieta de bajas calorías y ejercio puede ayudarle a bajar de peso.  Courtney medicamento puede ser utilizado para otros usos; si tiene alguna pregunta consulte con santana proveedor de atención médica o con santana farmacéutico.  MARCAS COMERCIALES DISPONIBLES: Adipex-P, Atti-Plex P , Atti-Plex P Spansule , Fastin, Pro-Fast, Eli-8   ¿Qué le rashid informar a mi profesional de la tony antes de vicki courtney medicamento?  Necesita saber si usted presenta alguno de los siguientes problemas o situaciones:  -agitación  -glaucoma  -enfermedad cardiaca  -loni presión sanguínea  -antecedentes de abuso de substancias  -enfermedad pulmonar llamada Hipertensión Pulmonar Primaria  -si ha tomado un IMAO, tales celeste Carbex, Eldepryl, Marplan, Nardil o Parnate en los últimos 14 días  -enfermedad tiroidea  -jose d reacción alérgica o inusual a la fentermina, a otros medicamentos, alimentos, colorantes o conservantes  -si está embarazada o buscando quedar embarazada  -si está amamantando a un bebé  ¿Cómo rashid utilizar courtney medicamento?  [place] courtney medicamento por vía oral con un vaso de agua. Siga las instrucciones de la etiqueta del medicamento. Courtney medicamento generalmente se danna 30 minutos antes o 1 a 2 horas después de desayunar. Evite vicki courtney medicamento por la tarde. Puede interferir con el sueño. [place] jose dosis a intervalos regulares. No tome santana medicamento con jose d frecuencia mayor a la indicada.  Hable con santana pediatra para informarse acerca del uso de courtney medicamento en niños. Puede requerir atención especial.  Sobredosis: Póngase en contacto inmediatamente con un centro toxicológico o jose d lola de urgencia si usted eleonora que haya tomado demasiado medicamento.  ATENCIÓN: Courtney medicamento es solo para usted. No comparta courtney medicamento con nadie.  ¿Qué sucede si me olvido de jose d dosis?  Si olvida jose d dosis, tómela lo antes  posible. Si es carlito la hora de la próxima dosis, tome sólo ruben dosis. No tome dosis adicionales o dobles.  ¿Qué puede interactuar con courtney medicamento?  No tome esta medicina con ninguno de los siguientes medicamentos:  -duloxetina  -IMAOs, tales celeste Carbex, Eldepryl, Marplan, Nardil y Parnate  -medicamentos para resfríos o problemas respiratorios, tales celeste seudoefedrina o fenilefrina  -procarbazina  -sibutramine  -ISRS, tales celeste citalopram, escitalopram, fluoxetina, fluvoxamina, paroxetina y sertralina  -estimulantes, tales celeste dexmetilfenidato, metilfenidato o modafinil  -venlafaxina  Esta medicina también puede interactuar con los siguientes medicamentos:  -medicamentos para la diabetes  Puede ser que esta lista no menciona todas las posibles interacciones. Informe a santana profesional de la tony de todos los productos a base de hierbas, medicamentos de venta chidi o suplementos nutritivos que esté tomando. Si usted fuma, consume bebidas alcohólicas o si utiliza drogas ilegales, indíqueselo también a santana profesional de la tony. Algunas sustancias pueden interactuar con santana medicamento.  ¿A qué rashid estar atento al usar courtney medicamento?  Informe a santana médico de inmediato si siente que le falta el aliento mientras realiza jose actividades habituales.  No tome courtney medicamento dentro de 6 horas de la hora de acostarse. Puede impedirle dormir. Evite las bebidas que contienen cafeína y trata de mantener un horario de acostarse habitual cada noche.  Courtney medicamento fue diseñado para ser utilizado en combinación con jose d dieta saludable y ejercicio. De esta manera se obtienen los mejores resultados. Courtney medicamento sólo está indicado para uso a corto plazo. Con el tiempo, santana peso puede estabilizarse. A partir de chelsea momento, el medicamento sólo le ayudará a mantener santana nuevo peso. No aumente ni modifique santana dosis de ninguna manera sin consultar a santana médico.  Puede experimentar mareos o somnolencia. No conduzca ni  utilice maquinaria ni alfonso nada que le exija permanecer en estado de alerta hasta que sepa cómo le afecta courtney medicamento. No se siente ni se ponga de pie con rapidez, especialmente si es un paciente de edad avanzada. [place] reduce el riesgo de mareos o desmayos. El alcohol puede aumentar los mareos y la somnolencia. Evite consumir bebidas alcohólicas.  ¿Qué efectos secundarios puedo tener al utilizar courtney medicamento?  Efectos secundarios que debe informar a santana médico o a santana profesional de la tony tan pronto celeste sea posible:  -dolor en el pecho, palpitaciones  -depresión o cambios severos de estados de ánimo  -aumento de la presión sanguínea  -irritabilidad  -nerviosismo o inquietud  -mareos severos  -falta de aliento  -problemas para orinar  -hinchazón inusual de las piernas  -vómito  Efectos secundarios que, por lo general, no requieren atención médica (debe informarlos a santana médico o a santana profesional de la tony si persisten o si son molestos):  -visión borrosa u otros problemas de mike  -cambios en la capacidad o el deseo sexual  -estreñimiento o diarrea  -dificultad para conciliar el sueño  -boca seca o sabor desagradable  -dolor de bryant  -náuseas  Puede ser que esta lista no menciona todos los posibles efectos secundarios. Comuníquese a santana médico por asesoramiento médico sobre los efectos secundarios. Usted puede informar los efectos secundarios a la FDA por teléfono al 1-800-FDA-1080.  ¿Dónde rashid guardar mi medicina?  [place] fuera del alcance de los niños. Courtney medicamento puede ser abusado. Mantenga santana medicamento en un lugar seguro para protegerlo contra robos. No comparta courtney medicamento con nadie. Es peligroso  o regalar courtney medicamento y está prohibido por la shimon.  Guárdela a temperatura ambiente, entre 20 y 25 grados C (68 y 77 grados F). Mantenga el envase cliff cerrado. Deseche todo el medicamento que no haya utilizado, después de la fecha de vencimiento.  ATENCIÓN: Courtney folleto es un  resumen. Puede ser que no cubra toda la posible información. Si usted tiene preguntas acerca de esta medicina, consulte con santana médico, santana farmacéutico o santana profesional de la tony.  © 2014, [employer]. (2/9/2012 7:33:11 PM)

## 2017-10-24 NOTE — PROGRESS NOTES
"Subjective:   Hortencia Bonilla is a 47 y.o. female here today for concerns about weight gain    Weight gain  Has been on phentermine in the past. Would like to try this medication again. Dr. Hossein Pemberton. It did help in the past. Has been out for 3 months. 3-4 months the last time. Lost 30 pounds. No side effects. No anxiety, palpitations, nausea. Makes her less hungry. Was on 30mg the entire time.     Has appointments with spine doctor and ENT scheduled.    Current medicines (including changes today)  Current Outpatient Prescriptions   Medication Sig Dispense Refill   • phentermine 30 MG capsule Take 1 Cap by mouth every morning. 30 Cap 0   • [START ON 11/23/2017] phentermine 30 MG capsule Take 1 Cap by mouth every morning. 30 Cap 0   • [START ON 12/23/2017] phentermine 30 MG capsule Take 1 Cap by mouth every morning. 30 Cap 0   • Cyanocobalamin (VITAMIN B12 PO) Take 2 Tabs by mouth every day.     • Cholecalciferol (VITAMIN D) 2000 UNIT Tab Take 4,000 Units by mouth every day.     • MAGNESIUM PO Take 1-2 Tabs by mouth every day.     • Omega-3 Fatty Acids (OMEGA 3 PO) Take  by mouth.     • ibuprofen (MOTRIN) 400 MG Tab Take 1 Tab by mouth every 8 hours as needed for Moderate Pain. 30 Tab 0     No current facility-administered medications for this visit.      She  has a past medical history of Bipolar disorder (CMS-[facility]) and Thyroid nodule.    ROS   No fever/chills.  No headache/dizziness. No focal weakness. No sore throat, nasal congestion, ear pain. No chest pain, no shortness of breath, difficulty breathing. No n/v/d/c or abdominal pain. No urinary complaint. No rash or skin lesion.      Objective:     Blood pressure 108/72, pulse 68, temperature 36.4 °C (97.6 °F), resp. rate 16, height 1.753 m (5' 9\"), weight 84.4 kg (186 lb), SpO2 97 %. Body mass index is 27.47 kg/m².   Physical Exam:  Constitutional: WDWN, NAD  Skin: Warm, dry, good turgor, no rashes in visible areas.  Respiratory: Unlabored respiratory " effort, lungs clear to auscultation, no wheezes, no ronchi.  Cardiovascular: Normal S1, S2, no murmur, no leg edema.  Psych: Alert and oriented x3, normal affect and mood.        Assessment and Plan:   The following treatment plan was discussed    1. Weight gain  Risk/benefit and potential side effects discussed. Only used for short term. Modify diet and exercise.   [facility] Aware web site evaluation: I have obtained and reviewed patient utilization report from [facility] pharmacy database prior to writing prescription for controlled substance II, III or IV. Based on the report and my clinical assessment the prescription is medically necessary.     - phentermine 30 MG capsule; Take 1 Cap by mouth every morning.  Dispense: 30 Cap; Refill: 0  - phentermine 30 MG capsule; Take 1 Cap by mouth every morning.  Dispense: 30 Cap; Refill: 0  - phentermine 30 MG capsule; Take 1 Cap by mouth every morning.  Dispense: 30 Cap; Refill: 0      Followup: Return in about 3 months (around 1/23/2018).

## 2017-11-28 ENCOUNTER — HOSPITAL ENCOUNTER (EMERGENCY)
Dept: HOSPITAL 8 - ED | Age: 47
Discharge: HOME | End: 2017-11-28
Payer: SELF-PAY

## 2017-11-28 VITALS — BODY MASS INDEX: 27.97 KG/M2 | HEIGHT: 68 IN | WEIGHT: 184.53 LBS

## 2017-11-28 VITALS — DIASTOLIC BLOOD PRESSURE: 60 MMHG | SYSTOLIC BLOOD PRESSURE: 114 MMHG

## 2017-11-28 DIAGNOSIS — J10.1: Primary | ICD-10-CM

## 2017-11-28 DIAGNOSIS — E78.00: ICD-10-CM

## 2017-11-28 DIAGNOSIS — I10: ICD-10-CM

## 2017-11-28 LAB
BUN SERPL-MCNC: 9 MG/DL (ref 7–18)
FLUAV AG SPEC QL IA: POSITIVE
HCT VFR BLD CALC: 37.6 % (ref 34.6–47.8)
HGB BLD-MCNC: 12.9 G/DL (ref 11.7–16.4)
WBC # BLD AUTO: 5.1 X10^3/UL (ref 3.4–10)

## 2017-11-28 PROCEDURE — 85025 COMPLETE CBC W/AUTO DIFF WBC: CPT

## 2017-11-28 PROCEDURE — 80048 BASIC METABOLIC PNL TOTAL CA: CPT

## 2017-11-28 PROCEDURE — 99285 EMERGENCY DEPT VISIT HI MDM: CPT

## 2017-11-28 PROCEDURE — 87147 CULTURE TYPE IMMUNOLOGIC: CPT

## 2017-11-28 PROCEDURE — 81001 URINALYSIS AUTO W/SCOPE: CPT

## 2017-11-28 PROCEDURE — 82040 ASSAY OF SERUM ALBUMIN: CPT

## 2017-11-28 PROCEDURE — 87086 URINE CULTURE/COLONY COUNT: CPT

## 2017-11-28 PROCEDURE — 84703 CHORIONIC GONADOTROPIN ASSAY: CPT

## 2017-11-28 PROCEDURE — 36415 COLL VENOUS BLD VENIPUNCTURE: CPT

## 2017-11-28 PROCEDURE — 71020: CPT

## 2017-11-28 PROCEDURE — 96372 THER/PROPH/DIAG INJ SC/IM: CPT

## 2017-11-28 PROCEDURE — 93005 ELECTROCARDIOGRAM TRACING: CPT

## 2017-11-28 PROCEDURE — 87400 INFLUENZA A/B EACH AG IA: CPT

## 2018-06-20 ENCOUNTER — HOSPITAL ENCOUNTER (OUTPATIENT)
Dept: LAB | Facility: MEDICAL CENTER | Age: 48
End: 2018-06-20
Attending: PHYSICIAN ASSISTANT
Payer: COMMERCIAL

## 2018-06-20 ENCOUNTER — OFFICE VISIT (OUTPATIENT)
Dept: MEDICAL GROUP | Facility: MEDICAL CENTER | Age: 48
End: 2018-06-20
Payer: COMMERCIAL

## 2018-06-20 VITALS
SYSTOLIC BLOOD PRESSURE: 108 MMHG | HEIGHT: 69 IN | RESPIRATION RATE: 16 BRPM | BODY MASS INDEX: 28.94 KG/M2 | OXYGEN SATURATION: 98 % | HEART RATE: 73 BPM | DIASTOLIC BLOOD PRESSURE: 70 MMHG | WEIGHT: 195.4 LBS

## 2018-06-20 DIAGNOSIS — G44.52 NEW DAILY PERSISTENT HEADACHE: ICD-10-CM

## 2018-06-20 DIAGNOSIS — Z87.898 HISTORY OF SEIZURE: ICD-10-CM

## 2018-06-20 LAB
ALBUMIN SERPL BCP-MCNC: 4.1 G/DL (ref 3.2–4.9)
ALBUMIN/GLOB SERPL: 1.2 G/DL
ALP SERPL-CCNC: 62 U/L (ref 30–99)
ALT SERPL-CCNC: 18 U/L (ref 2–50)
ANION GAP SERPL CALC-SCNC: 8 MMOL/L (ref 0–11.9)
AST SERPL-CCNC: 20 U/L (ref 12–45)
BASOPHILS # BLD AUTO: 0.2 % (ref 0–1.8)
BASOPHILS # BLD: 0.02 K/UL (ref 0–0.12)
BILIRUB SERPL-MCNC: 0.5 MG/DL (ref 0.1–1.5)
BUN SERPL-MCNC: 14 MG/DL (ref 8–22)
CALCIUM SERPL-MCNC: 9.3 MG/DL (ref 8.5–10.5)
CHLORIDE SERPL-SCNC: 104 MMOL/L (ref 96–112)
CO2 SERPL-SCNC: 26 MMOL/L (ref 20–33)
CREAT SERPL-MCNC: 0.59 MG/DL (ref 0.5–1.4)
EOSINOPHIL # BLD AUTO: 0.19 K/UL (ref 0–0.51)
EOSINOPHIL NFR BLD: 2.2 % (ref 0–6.9)
ERYTHROCYTE [DISTWIDTH] IN BLOOD BY AUTOMATED COUNT: 41.1 FL (ref 35.9–50)
ERYTHROCYTE [SEDIMENTATION RATE] IN BLOOD BY WESTERGREN METHOD: 36 MM/HOUR (ref 0–20)
GLOBULIN SER CALC-MCNC: 3.3 G/DL (ref 1.9–3.5)
GLUCOSE SERPL-MCNC: 91 MG/DL (ref 65–99)
HCT VFR BLD AUTO: 39.8 % (ref 37–47)
HGB BLD-MCNC: 13.1 G/DL (ref 12–16)
IMM GRANULOCYTES # BLD AUTO: 0.02 K/UL (ref 0–0.11)
IMM GRANULOCYTES NFR BLD AUTO: 0.2 % (ref 0–0.9)
LYMPHOCYTES # BLD AUTO: 2.74 K/UL (ref 1–4.8)
LYMPHOCYTES NFR BLD: 31.4 % (ref 22–41)
MCH RBC QN AUTO: 29.5 PG (ref 27–33)
MCHC RBC AUTO-ENTMCNC: 32.9 G/DL (ref 33.6–35)
MCV RBC AUTO: 89.6 FL (ref 81.4–97.8)
MONOCYTES # BLD AUTO: 0.56 K/UL (ref 0–0.85)
MONOCYTES NFR BLD AUTO: 6.4 % (ref 0–13.4)
NEUTROPHILS # BLD AUTO: 5.21 K/UL (ref 2–7.15)
NEUTROPHILS NFR BLD: 59.6 % (ref 44–72)
NRBC # BLD AUTO: 0 K/UL
NRBC BLD-RTO: 0 /100 WBC
PLATELET # BLD AUTO: 255 K/UL (ref 164–446)
PMV BLD AUTO: 10.8 FL (ref 9–12.9)
POTASSIUM SERPL-SCNC: 4.1 MMOL/L (ref 3.6–5.5)
PROT SERPL-MCNC: 7.4 G/DL (ref 6–8.2)
RBC # BLD AUTO: 4.44 M/UL (ref 4.2–5.4)
SODIUM SERPL-SCNC: 138 MMOL/L (ref 135–145)
TSH SERPL DL<=0.005 MIU/L-ACNC: 0.82 UIU/ML (ref 0.38–5.33)
WBC # BLD AUTO: 8.7 K/UL (ref 4.8–10.8)

## 2018-06-20 PROCEDURE — 99214 OFFICE O/P EST MOD 30 MIN: CPT | Performed by: PHYSICIAN ASSISTANT

## 2018-06-20 PROCEDURE — 85025 COMPLETE CBC W/AUTO DIFF WBC: CPT

## 2018-06-20 PROCEDURE — 80053 COMPREHEN METABOLIC PANEL: CPT

## 2018-06-20 PROCEDURE — 36415 COLL VENOUS BLD VENIPUNCTURE: CPT

## 2018-06-20 PROCEDURE — 84443 ASSAY THYROID STIM HORMONE: CPT

## 2018-06-20 PROCEDURE — 85652 RBC SED RATE AUTOMATED: CPT

## 2018-06-20 RX ORDER — KETOROLAC TROMETHAMINE 30 MG/ML
60 INJECTION, SOLUTION INTRAMUSCULAR; INTRAVENOUS ONCE
Status: COMPLETED | OUTPATIENT
Start: 2018-06-20 | End: 2018-06-20

## 2018-06-20 RX ADMIN — KETOROLAC TROMETHAMINE 60 MG: 30 INJECTION, SOLUTION INTRAMUSCULAR; INTRAVENOUS at 09:29

## 2018-06-20 NOTE — PROGRESS NOTES
"Subjective:   Hortencia Bonilla is a 47 y.o. female here today for headache    New daily persistent headache  Translation with the IPAD. 2 weeks. Taking tylenol. Not helping. Pain radiates from forehead around the sides of the head. She is concerned about a worm in her brain because she had this 10 years ago and it caused a seizure.     She says it was in [place]. She had a seizure and was taken to the hospital. Went to the specialist. Took medication. Came to [place]. Lowered medication and stopped. Doesn't know the medicine name. Saw \"Dr. Catalan\"? Maybe he was a skin doctor that said she could stop it?     Current headache - no dizziness, nausea, vision change. Normally doesn't get headaches. No known cause. No head injury, no MVA. Constant - sometimes more mild or more severe. Taking advil, not helping.            Current medicines (including changes today)  Current Outpatient Prescriptions   Medication Sig Dispense Refill   • Cyanocobalamin (VITAMIN B12 PO) Take 2 Tabs by mouth every day.     • Cholecalciferol (VITAMIN D) 2000 UNIT Tab Take 4,000 Units by mouth every day.     • MAGNESIUM PO Take 1-2 Tabs by mouth every day.     • Omega-3 Fatty Acids (OMEGA 3 PO) Take  by mouth.       No current facility-administered medications for this visit.      She  has a past medical history of Bipolar disorder (HCC) and Thyroid nodule.    ROS   No fever/chills. No weight change.  No focal weakness. No sore throat, nasal congestion, ear pain. No chest pain, no shortness of breath, difficulty breathing. No n/v/d/c or abdominal pain. No urinary complaint. No rash or skin lesion. No joint pain or swelling.       Objective:     Blood pressure 108/70, pulse 73, resp. rate 16, height 1.753 m (5' 9\"), weight 88.6 kg (195 lb 6.4 oz), SpO2 98 %. Body mass index is 28.86 kg/m².   Physical Exam:  Constitutional: WDWN, NAD  Skin: Warm, dry, good turgor, no rashes in visible areas.  Eye: Equal, round and reactive, conjunctiva clear, lids " normal.  ENMT: Lips without lesions, good dentition, oropharynx clear.  Neck: Trachea midline, no masses, no thyromegaly. No cervical or supraclavicular lymphadenopathy  Normal gait, normal strength upper and lower extremities  Psych: Alert and oriented x3, normal affect and mood.        Assessment and Plan:   The following treatment plan was discussed    Unclear what actually happened in [place]. She will try to get the name of the hospital so we can try and get records. ER precautions given.    1. New daily persistent headache    - MR-BRAIN-WITH & W/O; Future  - REFERRAL TO NEUROLOGY  - CBC WITH DIFFERENTIAL; Future  - COMP METABOLIC PANEL; Future  - TSH WITH REFLEX TO FT4; Future  - WESTERGREN SED RATE; Future  - ketorolac (TORADOL) injection 60 mg; 2 mL by Intramuscular route Once.    2. History of seizure    - MR-BRAIN-WITH & W/O; Future  - REFERRAL TO NEUROLOGY  - CBC WITH DIFFERENTIAL; Future  - COMP METABOLIC PANEL; Future  - TSH WITH REFLEX TO FT4; Future  - WESTERGREN SED RATE; Future      Followup: after labs and imaging

## 2018-06-20 NOTE — ASSESSMENT & PLAN NOTE
"Translation with the IPAD. 2 weeks. Taking tylenol. Not helping. Pain radiates from forehead around the sides of the head. She is concerned about a worm in her brain because she had this 10 years ago and it caused a seizure.     She says it was in [place]. She had a seizure and was taken to the hospital. Went to the specialist. Took medication. Came to [place]. Lowered medication and stopped. Doesn't know the medicine name. Saw \"Dr. Catalan\"? Maybe he was a skin doctor that said she could stop it?     Current headache - no dizziness, nausea, vision change. Normally doesn't get headaches. No known cause. No head injury, no MVA. Constant - sometimes more mild or more severe. Taking advil, not helping.       "

## 2018-06-21 ENCOUNTER — TELEPHONE (OUTPATIENT)
Dept: MEDICAL GROUP | Facility: MEDICAL CENTER | Age: 48
End: 2018-06-21

## 2018-06-21 NOTE — TELEPHONE ENCOUNTER
----- Message from Milady Hancock P.A.-C. sent at 6/21/2018  8:38 AM PDT -----  Please call patient (Bulgarian speaking). Lab work is normal. Was she able to schedule the MRI? Thanks, Milady Hancock PA-C

## 2018-11-05 ENCOUNTER — NON-PROVIDER VISIT (OUTPATIENT)
Dept: URGENT CARE | Facility: PHYSICIAN GROUP | Age: 48
End: 2018-11-05

## 2018-11-05 DIAGNOSIS — Z02.1 PRE-EMPLOYMENT DRUG SCREENING: ICD-10-CM

## 2018-11-05 LAB
AMP AMPHETAMINE: NORMAL
COC COCAINE: NORMAL
INT CON NEG: NEGATIVE
INT CON POS: POSITIVE
MET METHAMPHETAMINES: NORMAL
OPI OPIATES: NORMAL
PCP PHENCYCLIDINE: NORMAL
POC DRUG COMMENT 753798-OCCUPATIONAL HEALTH: NORMAL
THC: NORMAL

## 2018-11-05 PROCEDURE — 80305 DRUG TEST PRSMV DIR OPT OBS: CPT | Performed by: PHYSICIAN ASSISTANT

## 2018-12-11 ENCOUNTER — TELEPHONE (OUTPATIENT)
Dept: MEDICAL GROUP | Facility: PHYSICIAN GROUP | Age: 48
End: 2018-12-11

## 2018-12-12 ENCOUNTER — OFFICE VISIT (OUTPATIENT)
Dept: MEDICAL GROUP | Facility: PHYSICIAN GROUP | Age: 48
End: 2018-12-12
Payer: COMMERCIAL

## 2018-12-12 VITALS
DIASTOLIC BLOOD PRESSURE: 70 MMHG | HEIGHT: 69 IN | SYSTOLIC BLOOD PRESSURE: 102 MMHG | WEIGHT: 199 LBS | HEART RATE: 80 BPM | OXYGEN SATURATION: 95 % | RESPIRATION RATE: 16 BRPM | TEMPERATURE: 98.7 F | BODY MASS INDEX: 29.47 KG/M2

## 2018-12-12 DIAGNOSIS — Z12.11 SCREENING FOR COLORECTAL CANCER: ICD-10-CM

## 2018-12-12 DIAGNOSIS — E04.1 THYROID NODULE: ICD-10-CM

## 2018-12-12 DIAGNOSIS — Z12.12 SCREENING FOR COLORECTAL CANCER: ICD-10-CM

## 2018-12-12 DIAGNOSIS — Z12.31 SCREENING MAMMOGRAM, ENCOUNTER FOR: ICD-10-CM

## 2018-12-12 PROBLEM — R25.2 CRAMP OF BOTH LOWER EXTREMITIES: Status: RESOLVED | Noted: 2017-09-13 | Resolved: 2018-12-12

## 2018-12-12 PROBLEM — F31.9 BIPOLAR DISORDER (HCC): Status: ACTIVE | Noted: 2018-12-12

## 2018-12-12 PROBLEM — R29.898 LEG HEAVINESS: Status: RESOLVED | Noted: 2017-09-21 | Resolved: 2018-12-12

## 2018-12-12 PROBLEM — R63.5 WEIGHT GAIN: Status: RESOLVED | Noted: 2017-10-23 | Resolved: 2018-12-12

## 2018-12-12 PROCEDURE — 99214 OFFICE O/P EST MOD 30 MIN: CPT | Performed by: FAMILY MEDICINE

## 2018-12-12 NOTE — PROGRESS NOTES
CC: Thyroid nodule    HISTORY OF THE PRESENT ILLNESS: Patient is a 48 y.o. female. This pleasant patient is here today to establish care and discuss health problems as below.    Entire encounter was performed with iPad .    Thyroid nodule: Patient reports this is been an issue for her for about 10 years now.  She apparently used to live in [place] and reports that maybe around 10 years ago she got it biopsied and it was not cancer.  She was told that as long as it does not grow she will not need to do anything about it.  Per chart review between 2014 and 2017 she had had 2 thyroid ultrasounds with interval change in the size of the nodule.  She has not seen ENT recently.  She does report that she is got somewhat of a cough and that sometimes her entire tongue goes numb.  She finds it hard to actually describe though.  She also now reports another hard lump on the left side of her jawline.    Patient also desires other preventative care today including screening for colon cancer and mammogram.    Allergies: Bloodless    Current Outpatient Prescriptions Ordered in [facility]   Medication Sig Dispense Refill   • Cyanocobalamin (VITAMIN B12 PO) Take 2 Tabs by mouth every day.     • Cholecalciferol (VITAMIN D) 2000 UNIT Tab Take 4,000 Units by mouth every day.     • MAGNESIUM PO Take 1-2 Tabs by mouth every day.     • Omega-3 Fatty Acids (OMEGA 3 PO) Take  by mouth.       No current Epic-ordered facility-administered medications on file.        Past Medical History:   Diagnosis Date   • Bipolar disorder (HCC)    • Thyroid nodule        Past Surgical History:   Procedure Laterality Date   • PRIMARY C SECTION      x 1       Social History   Substance Use Topics   • Smoking status: Never Smoker   • Smokeless tobacco: Never Used   • Alcohol use No       Social History     Social History Narrative   • No narrative on file       Family History   Problem Relation Age of Onset   • Other Brother         OCD ([place]) half brother  "      ROS:     - Constitutional: Positive for weight gain.  Negative for fever, chills,  and fatigue/generalized weakness.     - HEENT positive for numbness of tongue.  Positive for lump in left side of neck.: Negative for headaches, vision changes, hearing changes, ear pain, ear discharge, rhinorrhea, sinus congestion, sore throat, and neck pain.      Exam: Blood pressure 102/70, pulse 80, temperature 37.1 °C (98.7 °F), temperature source Temporal, resp. rate 16, height 1.753 m (5' 9\"), weight 90.3 kg (199 lb), SpO2 95 %. Body mass index is 29.39 kg/m².    General: Well appearing, NAD  HEENT: Normocephalic. Conjunctiva clear, lids without ptosis, pupils equal and reactive to light accommodation, ears normal shape and contour, oropharynx is without erythema, edema or exudates.   Neck: Approximately half inch in diameter hard nodule palpated just under the left jaw line near the mandible.  Thyroid diffusely enlarged right greater than left.  Right-sided thyroid nodule palpated.   Pulmonary: Clear to ausculation.  Normal effort. No rales, ronchi, or wheezing.  Cardiovascular: Regular rate and rhythm without murmur, rubs or gallop. Carotid and radial pulses are intact and equal bilaterally.  Neurologic: Cranial nerves 2-12 intact, normal gait  Skin: Warm and dry.  No obvious lesions.  Musculoskeletal:  No extremity cyanosis, clubbing, or edema.  Psych: Normal mood and affect. Alert and oriented. Judgment and insight is normal.      Please note that this dictation was created using voice recognition software. I have made every reasonable attempt to correct obvious errors, but I expect that there are errors of grammar and possibly content that I did not discover before finalizing the note.      Assessment/Plan  Hortencia was seen today for establish care.    Diagnoses and all orders for this visit:    Thyroid nodule  This is a chronic uncontrolled problem for the patient.  When she had her ultrasound in 2017 per chart " review it looks like there was some concern regarding size and necessity for biopsy.  She has not had any biopsy.  I will go ahead and repeat the ultrasound to see if there is been any interval change as well as refer her to ENT at this time.  -     US-SOFT TISSUES OF HEAD - NECK; Future  -     REFERRAL TO ENT    Screening for colorectal cancer  -     OCCULT BLOOD FECES IMMUNOASSAY (FIT); Future    Screening mammogram, encounter for  -     MA-SCREEN MAMMO W/CAD-BILAT; Future    Return in about 5 days for well woman exam.    Florence Ruiz,   [facility]

## 2018-12-17 ENCOUNTER — TELEPHONE (OUTPATIENT)
Dept: MEDICAL GROUP | Facility: PHYSICIAN GROUP | Age: 48
End: 2018-12-17

## 2018-12-17 NOTE — TELEPHONE ENCOUNTER
Future Appointments       Provider Department Center    12/18/2018 9:10 AM Florence Ruiz D.O. [facility]        ESTABLISHED PATIENT PRE-VISIT PLANNING     Patient was contacted to complete PVP.     Note: Patient will not be contacted if there is no indication to call.     1.  Reviewed notes from the last few office visits within the medical group: Yes    2.  If any orders were placed at last visit or intended to be done for this visit (i.e. 6 mos follow-up), do we have Results/Consult Notes?        •  Labs - Labs ordered, NOT completed. Patient advised to complete prior to next appointment.       •  Imaging - Imaging ordered, NOT completed. Patient advised to complete prior to next appointment.       •  Referrals - Referral ordered, patient has NOT been seen.    3. Is this appointment scheduled as a Hospital Follow-Up? No    4.  Immunizations were updated in [facility] using WebIZ?: Yes       •  Web Iz Recommendations: FLU, MMR , TD and SHINGRIX (Shingles)    5.  Patient is due for the following Health Maintenance Topics:   Health Maintenance Due   Topic Date Due   • MAMMOGRAM  05/04/2016   • PAP SMEAR  01/14/2018   • COLON CANCER SCREENING ANNUAL FIT  08/03/2018   • IMM ZOSTER VACCINES (1 of 2) 08/03/2018   • IMM INFLUENZA (1) 09/01/2018       - Patient plans to schedule appointment for Colonoscopy/FIT and Mammogram.    6.  MDX printed for Provider? NO    7.  Patient was informed to arrive 15 min prior to their scheduled appointment and bring in their medication bottles. BEN

## 2018-12-19 ENCOUNTER — OFFICE VISIT (OUTPATIENT)
Dept: URGENT CARE | Facility: PHYSICIAN GROUP | Age: 48
End: 2018-12-19
Payer: COMMERCIAL

## 2018-12-19 VITALS
DIASTOLIC BLOOD PRESSURE: 78 MMHG | RESPIRATION RATE: 18 BRPM | OXYGEN SATURATION: 96 % | SYSTOLIC BLOOD PRESSURE: 124 MMHG | TEMPERATURE: 98.8 F | HEART RATE: 72 BPM

## 2018-12-19 DIAGNOSIS — J06.9 VIRAL URI WITH COUGH: ICD-10-CM

## 2018-12-19 PROCEDURE — 99214 OFFICE O/P EST MOD 30 MIN: CPT | Performed by: PHYSICIAN ASSISTANT

## 2018-12-19 RX ORDER — CODEINE PHOSPHATE AND GUAIFENESIN 10; 100 MG/5ML; MG/5ML
5 SOLUTION ORAL EVERY 4 HOURS PRN
Qty: 100 ML | Refills: 0 | Status: SHIPPED | OUTPATIENT
Start: 2018-12-19 | End: 2018-12-24

## 2018-12-19 ASSESSMENT — ENCOUNTER SYMPTOMS
SHORTNESS OF BREATH: 0
SINUS PAIN: 0
COUGH: 1
MYALGIAS: 1
WHEEZING: 0
SORE THROAT: 1
ABDOMINAL PAIN: 0
FEVER: 0
DIZZINESS: 0
HEADACHES: 1
VOMITING: 0
CHILLS: 1

## 2018-12-19 NOTE — LETTER
December 19, 2018         Patient: Hortencia Bonilla   YOB: 1970   Date of Visit: 12/19/2018           To Whom it May Concern:    Hortencia Bonilla was seen in my clinic on 12/19/2018. She may return to work on Friday Dec. 20th.    If you have any questions or concerns, please don't hesitate to call.        Sincerely,           Jose Mejia P.A.-C.  Electronically Signed

## 2018-12-19 NOTE — PROGRESS NOTES
Subjective:      Hortencia Bonilla is a 48 y.o. female who presents with Cough (chills, x 1 day )            Cough   This is a new problem. The current episode started yesterday. The problem has been gradually worsening. The problem occurs every few minutes. The cough is productive of sputum. Associated symptoms include chills, headaches, myalgias and a sore throat. Pertinent negatives include no ear pain, fever, shortness of breath or wheezing. She has tried nothing for the symptoms. The treatment provided no relief. There is no history of asthma, bronchitis or pneumonia.         PMH:  has a past medical history of Bipolar disorder (HCC) and Thyroid nodule.  MEDS:   Current Outpatient Prescriptions:   •  guaifenesin-codeine (ROBITUSSIN AC) Solution oral solution, Take 5 mL by mouth every four hours as needed for up to 5 days., Disp: 100 mL, Rfl: 0  •  Cyanocobalamin (VITAMIN B12 PO), Take 2 Tabs by mouth every day., Disp: , Rfl:   •  Cholecalciferol (VITAMIN D) 2000 UNIT Tab, Take 4,000 Units by mouth every day., Disp: , Rfl:   •  MAGNESIUM PO, Take 1-2 Tabs by mouth every day., Disp: , Rfl:   •  Omega-3 Fatty Acids (OMEGA 3 PO), Take  by mouth., Disp: , Rfl:   ALLERGIES:   Allergies   Allergen Reactions   • Bloodless      SURGHX:   Past Surgical History:   Procedure Laterality Date   • PRIMARY C SECTION      x 1     SOCHX:  reports that she has never smoked. She has never used smokeless tobacco. She reports that she does not drink alcohol or use drugs.  FH: family history includes Other in her brother.    Review of Systems   Constitutional: Positive for chills. Negative for fever.   HENT: Positive for sore throat. Negative for congestion, ear pain and sinus pain.    Respiratory: Positive for cough. Negative for shortness of breath and wheezing.    Gastrointestinal: Negative for abdominal pain and vomiting.   Musculoskeletal: Positive for myalgias. Negative for joint pain.   Neurological: Positive for headaches.  Negative for dizziness.       Medications, Allergies, and current problem list reviewed today in Epic     Objective:     /78 (BP Location: Right arm, Patient Position: Sitting, BP Cuff Size: Adult long)   Pulse 72   Temp 37.1 °C (98.8 °F) (Temporal)   Resp 18   SpO2 96%      Physical Exam   Constitutional: She is oriented to person, place, and time. She appears well-developed and well-nourished. No distress.   HENT:   Head: Normocephalic and atraumatic.   Right Ear: Tympanic membrane and external ear normal.   Left Ear: Tympanic membrane and external ear normal.   Nose: Nose normal.   Mouth/Throat: Oropharynx is clear and moist. No oropharyngeal exudate.   Eyes: Pupils are equal, round, and reactive to light. Conjunctivae and EOM are normal. Right eye exhibits no discharge. Left eye exhibits no discharge.   Neck: Normal range of motion. Neck supple.   Cardiovascular: Normal rate, regular rhythm and normal heart sounds.    Pulmonary/Chest: Effort normal and breath sounds normal. No respiratory distress. She has no wheezes.   Musculoskeletal: Normal range of motion.   Lymphadenopathy:     She has no cervical adenopathy.   Neurological: She is alert and oriented to person, place, and time.   Skin: Skin is warm and dry. She is not diaphoretic.   Psychiatric: She has a normal mood and affect. Her behavior is normal. Judgment and thought content normal.   Nursing note and vitals reviewed.              Assessment/Plan:     1. Viral URI with cough  guaifenesin-codeine (ROBITUSSIN AC) Solution oral solution     Vitals normal, PO2 adequate, exam unremarkable, no signs of bacterial infection.  [facility] Aware web site evaluation: I have obtained and reviewed patient utilization report from [facility] pharmacy database prior to writing prescription for controlled substance II, III or IV. Based on the report and my clinical assessment the prescription is medically necessary.   OTC meds and conservative measures as  discussed    Return to clinic or go to ED if symptoms worsen or persist. Indications for ED discussed at length. Patient voices understanding. Follow-up with your primary care provider in 3-5 days. Red flags discussed. All side effects of medication discussed including allergic response, GI upset, tendon injury, etc.    Please note that this dictation was created using voice recognition software. I have made every reasonable attempt to correct obvious errors, but I expect that there are errors of grammar and possibly content that I did not discover before finalizing the note.

## 2019-01-02 ENCOUNTER — OFFICE VISIT (OUTPATIENT)
Dept: MEDICAL GROUP | Facility: PHYSICIAN GROUP | Age: 49
End: 2019-01-02
Payer: COMMERCIAL

## 2019-01-02 ENCOUNTER — HOSPITAL ENCOUNTER (OUTPATIENT)
Facility: MEDICAL CENTER | Age: 49
End: 2019-01-02
Attending: FAMILY MEDICINE
Payer: COMMERCIAL

## 2019-01-02 VITALS
WEIGHT: 202 LBS | BODY MASS INDEX: 29.92 KG/M2 | TEMPERATURE: 97.1 F | HEART RATE: 74 BPM | OXYGEN SATURATION: 97 % | SYSTOLIC BLOOD PRESSURE: 108 MMHG | HEIGHT: 69 IN | DIASTOLIC BLOOD PRESSURE: 76 MMHG

## 2019-01-02 DIAGNOSIS — Z12.4 PAP SMEAR FOR CERVICAL CANCER SCREENING: ICD-10-CM

## 2019-01-02 DIAGNOSIS — N89.8 VAGINAL ITCHING: ICD-10-CM

## 2019-01-02 DIAGNOSIS — N64.4 BREAST PAIN, LEFT: ICD-10-CM

## 2019-01-02 PROCEDURE — 87591 N.GONORRHOEAE DNA AMP PROB: CPT

## 2019-01-02 PROCEDURE — 87491 CHLMYD TRACH DNA AMP PROBE: CPT

## 2019-01-02 PROCEDURE — 87624 HPV HI-RISK TYP POOLED RSLT: CPT

## 2019-01-02 PROCEDURE — 87510 GARDNER VAG DNA DIR PROBE: CPT

## 2019-01-02 PROCEDURE — 87660 TRICHOMONAS VAGIN DIR PROBE: CPT

## 2019-01-02 PROCEDURE — 88175 CYTOPATH C/V AUTO FLUID REDO: CPT

## 2019-01-02 PROCEDURE — 87480 CANDIDA DNA DIR PROBE: CPT

## 2019-01-02 PROCEDURE — 99396 PREV VISIT EST AGE 40-64: CPT | Performed by: FAMILY MEDICINE

## 2019-01-02 NOTE — PROGRESS NOTES
CC:  Well women exam    HISTORY OF THE PRESENT ILLNESS: Patient is a 48 y.o. female. This pleasant patient is here today for a well woman exam.     Entire encounter was performed using iPad  in Cambodian.    Patient is here for well woman exam today.  She denies history of abnormal Pap smears.      Her concern today is pain of her left breast.  She notes it to be on the outer portion of the left breast and sometimes sharp.  She has not noticed any lumps or bumps or changes in the breast otherwise.  A mammogram was ordered at her last visit but she has not been able to schedule it yet because she was in [place] over the holidays.  She does plan to schedule the mammogram soon.    She denies dysuria, discharge.  She does have an issue with frequent vaginal itching. She is open to STI testing today.     She denies family history of breast, uterine, ovarian cancer.    She has no other concerns from a female or gynecologic standpoint.    Allergies: Bloodless    Current Outpatient Prescriptions Ordered in [facility]   Medication Sig Dispense Refill   • Cyanocobalamin (VITAMIN B12 PO) Take 2 Tabs by mouth every day.     • Cholecalciferol (VITAMIN D) 2000 UNIT Tab Take 4,000 Units by mouth every day.     • MAGNESIUM PO Take 1-2 Tabs by mouth every day.     • Omega-3 Fatty Acids (OMEGA 3 PO) Take  by mouth.       No current Epic-ordered facility-administered medications on file.        Past Medical History:   Diagnosis Date   • Bipolar disorder (HCC)    • Thyroid nodule        Past Surgical History:   Procedure Laterality Date   • PRIMARY C SECTION      x 1       Social History   Substance Use Topics   • Smoking status: Never Smoker   • Smokeless tobacco: Never Used   • Alcohol use No       Social History     Social History Narrative   • No narrative on file       Family History   Problem Relation Age of Onset   • Other Brother         OCD (Davon) half brother       ROS:   See HPI    Exam: Blood pressure 108/76, pulse 74,  "temperature 36.2 °C (97.1 °F), temperature source Temporal, height 1.753 m (5' 9\"), weight 91.6 kg (202 lb), SpO2 97 %. Body mass index is 29.83 kg/m².    General: Well appearing, NAD  : Normal external genitalia.  Normal cervix and vagina.  Physiologic discharge present.  No cervical motion tenderness, adnexal tenderness, adnexal masses.  Breasts: Breasts normal in appearance without puckering or dimpling.  No lumps or masses felt on breast exam.  Few seborrheic keratoses present on areola of left breast.  No nipple discharge.  Lymph: No  axillary lymph nodes are palpable  Skin: Warm and dry.  No obvious lesions.  Psych: Normal mood and affect. Alert and oriented. Judgment and insight is normal.    Please note that this dictation was created using voice recognition software. I have made every reasonable attempt to correct obvious errors, but I expect that there are errors of grammar and possibly content that I did not discover before finalizing the note.      Assessment/Plan  Hortencia was seen today for gynecologic exam.    Diagnoses and all orders for this visit:    Pap smear for cervical cancer screening  No concerns on review of systems or exam today.  Patient plans to schedule mammogram.  -     THINPREP PAP W/HPV AND CTNG; Future    Vaginal itching  This is a new problem for the patient.  I have encouraged her to use unscented gentle soaps.  BV panel sent as below though doubt based on exam.  -     BACTERIAL VAGINOSIS, TANYA    Breast pain, left  This is a fairly chronic problem for the patient.  Her breast exam today was normal in clinic.  She is planning to schedule mammogram and that was ordered at her last visit.    Return in about 1 year for annual physical.    Florence Ruiz DO  [facility]      "

## 2019-01-03 ENCOUNTER — HOSPITAL ENCOUNTER (OUTPATIENT)
Dept: RADIOLOGY | Facility: MEDICAL CENTER | Age: 49
End: 2019-01-03

## 2019-01-03 DIAGNOSIS — Z12.4 PAP SMEAR FOR CERVICAL CANCER SCREENING: ICD-10-CM

## 2019-01-03 LAB
C TRACH DNA GENITAL QL NAA+PROBE: NEGATIVE
CANDIDA DNA VAG QL PROBE+SIG AMP: NEGATIVE
CYTOLOGY REG CYTOL: NORMAL
G VAGINALIS DNA VAG QL PROBE+SIG AMP: NEGATIVE
HPV HR 12 DNA CVX QL NAA+PROBE: NEGATIVE
HPV16 DNA SPEC QL NAA+PROBE: NEGATIVE
HPV18 DNA SPEC QL NAA+PROBE: NEGATIVE
N GONORRHOEA DNA GENITAL QL NAA+PROBE: NEGATIVE
SPECIMEN SOURCE: NORMAL
SPECIMEN SOURCE: NORMAL
T VAGINALIS DNA VAG QL PROBE+SIG AMP: NEGATIVE

## 2019-01-04 ENCOUNTER — TELEPHONE (OUTPATIENT)
Dept: MEDICAL GROUP | Facility: PHYSICIAN GROUP | Age: 49
End: 2019-01-04

## 2019-01-04 NOTE — TELEPHONE ENCOUNTER
Left message for patient to call back @ [phone]   services [phone]  [facility] ID: [id number]

## 2019-01-04 NOTE — TELEPHONE ENCOUNTER
----- Message from Florence Ruiz D.O. sent at 1/4/2019  7:50 AM PST -----  ## needed##    Please call patient and let her know that her Pap smear was negative and she also tested negative for any sexually transmitted infections.

## 2019-01-16 ENCOUNTER — HOSPITAL ENCOUNTER (OUTPATIENT)
Dept: RADIOLOGY | Facility: MEDICAL CENTER | Age: 49
End: 2019-01-16
Attending: FAMILY MEDICINE
Payer: COMMERCIAL

## 2019-01-16 DIAGNOSIS — Z12.31 SCREENING MAMMOGRAM, ENCOUNTER FOR: ICD-10-CM

## 2019-01-16 DIAGNOSIS — E04.1 THYROID NODULE: ICD-10-CM

## 2019-01-16 PROCEDURE — 76536 US EXAM OF HEAD AND NECK: CPT

## 2019-01-16 PROCEDURE — 77067 SCR MAMMO BI INCL CAD: CPT

## 2019-01-21 ENCOUNTER — TELEPHONE (OUTPATIENT)
Dept: MEDICAL GROUP | Facility: PHYSICIAN GROUP | Age: 49
End: 2019-01-21

## 2019-01-21 NOTE — TELEPHONE ENCOUNTER
----- Message from Florence Ruiz D.O. sent at 1/20/2019  4:08 PM PST -----  Please call pt and let her know that her mammogram was normal. (She needs someone to call in Wallisian). Thank you

## 2019-01-21 NOTE — LETTER
January 22, 2019        Hortencia Bonilla  [address]  [place] [address]        Dear Hortencia:    Our office has attempted to call you multiple times regarding the messages below:   Your thyroid nodules have not changed in size which is good. However,  would still you to be seen by ENT, a referral has been placed. Also, your mammogram was normal, your pap smear was negative and all STD testing came back negative.    If you have any questions or concerns, please don't hesitate to call.        Sincerely,        Florence Ruiz D.O.    Electronically Signed

## 2019-01-21 NOTE — TELEPHONE ENCOUNTER
----- Message from Florence Ruiz D.O. sent at 1/20/2019  4:35 PM PST -----  Scottish speaker needed:    Please call pt and let her know that her thyroid nodules have not changed in size which is good. However, I would still like for her to be seen by ENT, The referral appears to still be pending but has already been placed .

## 2019-01-21 NOTE — TELEPHONE ENCOUNTER
Left message for patient to call back @ [phone]   services [phone]  [facility] ID: [id number]

## 2019-01-21 NOTE — TELEPHONE ENCOUNTER
Left message for patient to call back @ [phone]   services [phone]  [facility] ID: [id number]

## 2019-02-05 ENCOUNTER — HOSPITAL ENCOUNTER (OUTPATIENT)
Facility: MEDICAL CENTER | Age: 49
End: 2019-02-05
Attending: FAMILY MEDICINE
Payer: COMMERCIAL

## 2019-02-05 PROCEDURE — 82274 ASSAY TEST FOR BLOOD FECAL: CPT

## 2019-02-08 DIAGNOSIS — Z12.11 SCREENING FOR COLORECTAL CANCER: ICD-10-CM

## 2019-02-08 DIAGNOSIS — Z12.12 SCREENING FOR COLORECTAL CANCER: ICD-10-CM

## 2019-02-08 LAB — AMBIGUOUS DTTM AMBI4: NORMAL

## 2019-02-09 LAB — HEMOCCULT STL QL IA: NEGATIVE

## 2019-02-11 ENCOUNTER — TELEPHONE (OUTPATIENT)
Dept: MEDICAL GROUP | Facility: PHYSICIAN GROUP | Age: 49
End: 2019-02-11

## 2019-02-11 NOTE — TELEPHONE ENCOUNTER
services [phone]  [facility] ID: [id number]  Left message for patient to call back @ [phone]

## 2019-02-11 NOTE — TELEPHONE ENCOUNTER
----- Message from Florence Ruiz D.O. sent at 2/11/2019  9:14 AM PST -----  Please call patient let her know that her stool test was negative for blood.  We can repeat this yearly as a test to screen for colon cancer.  (I believe she does speak Moroccan only)

## 2019-02-26 DIAGNOSIS — Z12.11 SCREEN FOR COLON CANCER: ICD-10-CM

## 2019-02-27 NOTE — TELEPHONE ENCOUNTER
Informed pt of message below. Pt would like a referral GI for colonoscopy she would feel more secure this way. Please advise.

## 2019-04-16 PROBLEM — K63.5 COLON POLYP: Status: ACTIVE | Noted: 2019-04-16

## 2020-01-27 ENCOUNTER — OFFICE VISIT (OUTPATIENT)
Dept: MEDICAL GROUP | Facility: PHYSICIAN GROUP | Age: 50
End: 2020-01-27
Payer: COMMERCIAL

## 2020-01-27 VITALS
HEART RATE: 78 BPM | OXYGEN SATURATION: 96 % | BODY MASS INDEX: 31.25 KG/M2 | DIASTOLIC BLOOD PRESSURE: 70 MMHG | WEIGHT: 211 LBS | TEMPERATURE: 97.2 F | HEIGHT: 69 IN | SYSTOLIC BLOOD PRESSURE: 114 MMHG

## 2020-01-27 DIAGNOSIS — Z12.31 ENCOUNTER FOR SCREENING MAMMOGRAM FOR BREAST CANCER: ICD-10-CM

## 2020-01-27 DIAGNOSIS — M48.10 DISH (DIFFUSE IDIOPATHIC SKELETAL HYPEROSTOSIS): ICD-10-CM

## 2020-01-27 DIAGNOSIS — M47.816 OSTEOARTHRITIS OF LUMBAR SPINE, UNSPECIFIED SPINAL OSTEOARTHRITIS COMPLICATION STATUS: ICD-10-CM

## 2020-01-27 PROCEDURE — 99214 OFFICE O/P EST MOD 30 MIN: CPT | Performed by: FAMILY MEDICINE

## 2020-01-27 NOTE — LETTER
[facility]  [address]  [place] [address]     January 27, 2020    Patient: Hortencia Bonilla   YOB: 1970   Date of Visit: 1/27/2020       To Whom It May Concern:    Hortencia Bonilla was seen and treated in our department on 1/27/2020. Due to medical condition, patient needs to work in department where she does not lift more than 10 pound or work in a cold environment.     Sincerely,     Florence Ruiz D.O.

## 2020-01-27 NOTE — LETTER
[facility]  [address]  [place] [address]     January 27, 2020    Patient: Hortencia Bonilla   YOB: 1970   Date of Visit: 1/27/2020       To Whom It May Concern:    Hortencia Bonilla was seen and treated in our department on 1/27/2020.  Due to a medical condition, she should not be allowed to work in cold temperatures.    Sincerely,     Florence Ruiz D.O.

## 2020-01-28 NOTE — PROGRESS NOTES
CC: Back pain    HISTORY OF THE PRESENT ILLNESS: Patient is a 49 y.o. female. This pleasant patient is here today to discuss the following issue.    Entire encounter was performed using iPad  in Upper sorbian.    Patient is here today to discuss back pain.  She has known lumbar degenerative disc disease.  In reviewing her chart, she also had thoracic x-rays on October 31, 2013 showing prominent osteophytes consistent with DISH.  She notes that she saw a spine specialist at some point in this interval who told her she likely needed spine surgery as physical therapy did not help her.  She was also given the option for back injections but concerned about side effects of never pursued any of these treatments.  Today she notes the back pain is somewhat worsening.  She pretty much has constant thoracic back pain.  Feels that working in a cold environment makes it worse and is asking for a letter for work stating that she cannot work in cold temperatures.    Allergies: Bloodless    Current Outpatient Medications Ordered in Epic   Medication Sig Dispense Refill   • Cyanocobalamin (VITAMIN B12 PO) Take 2 Tabs by mouth every day.     • Cholecalciferol (VITAMIN D) 2000 UNIT Tab Take 4,000 Units by mouth every day.     • MAGNESIUM PO Take 1-2 Tabs by mouth every day.     • Omega-3 Fatty Acids (OMEGA 3 PO) Take  by mouth.       No current Epic-ordered facility-administered medications on file.        Past Medical History:   Diagnosis Date   • Bipolar disorder (HCC)    • Thyroid nodule        Past Surgical History:   Procedure Laterality Date   • PRIMARY C SECTION      x 1       Social History     Tobacco Use   • Smoking status: Never Smoker   • Smokeless tobacco: Never Used   Substance Use Topics   • Alcohol use: No   • Drug use: No       Social History     Patient does not qualify to have social determinant information on file (likely too young).   Social History Narrative   • Not on file       Family History   Problem  "Relation Age of Onset   • Other Brother         OCD (Davon) half brother       ROS:     - Constitutional: Negative for fever, chills, unexpected weight change, and fatigue/generalized weakness.     - Neurological: Negative for dizziness, tingling, tremors, focal sensory deficit, focal weakness and headaches.     Exam: /70 (BP Location: Right arm, Patient Position: Sitting, BP Cuff Size: Adult)   Pulse 78   Temp 36.2 °C (97.2 °F) (Temporal)   Ht 1.753 m (5' 9\")   Wt 95.7 kg (211 lb)   SpO2 96%  Body mass index is 31.16 kg/m².    General: Well appearing, NAD  Skin: Warm and dry.  No obvious lesions.  Musculoskeletal: Patient with some mild tenderness to palpation along the thoracic and lumbar spinous processes well as paraspinals in this area as well.  She has mildly limited range of motion with rotation, flexion and extension at the lumbar and thoracic spine.  Psych: Normal mood and affect. Alert and oriented. Judgment and insight is normal.    Please note that this dictation was created using voice recognition software. I have made every reasonable attempt to correct obvious errors, but I expect that there are errors of grammar and possibly content that I did not discover before finalizing the note.      Assessment/Plan  Hortencia was seen today for back pain and paperwork.    Diagnoses and all orders for this visit:    Osteoarthritis of lumbar spine, unspecified spinal osteoarthritis complication status   DISH (diffuse idiopathic skeletal hyperostosis)  Chronic uncontrolled problems for the patient, new issues to me.  Recommend referral to spine surgery at this time given previous imaging and failure to respond to physical therapy in the past.  She is agreeable to this.  -     REFERRAL TO SPINE SURGERY    Encounter for screening mammogram for breast cancer  -     MA-SCREENING MAMMO BILAT W/TOMOSYNTHESIS W/CAD; Future    Follow-up in 6 months or sooner if needed.    Florence Ruiz, DO  [facility]

## 2020-03-16 ENCOUNTER — OFFICE VISIT (OUTPATIENT)
Dept: URGENT CARE | Facility: CLINIC | Age: 50
End: 2020-03-16
Payer: COMMERCIAL

## 2020-03-16 VITALS
RESPIRATION RATE: 16 BRPM | DIASTOLIC BLOOD PRESSURE: 76 MMHG | WEIGHT: 210 LBS | SYSTOLIC BLOOD PRESSURE: 144 MMHG | HEART RATE: 87 BPM | BODY MASS INDEX: 31.1 KG/M2 | HEIGHT: 69 IN | TEMPERATURE: 96.8 F | OXYGEN SATURATION: 96 %

## 2020-03-16 DIAGNOSIS — R05.9 COUGH: ICD-10-CM

## 2020-03-16 PROCEDURE — 99214 OFFICE O/P EST MOD 30 MIN: CPT | Performed by: NURSE PRACTITIONER

## 2020-03-16 RX ORDER — BENZONATATE 100 MG/1
100 CAPSULE ORAL 3 TIMES DAILY PRN
Qty: 30 CAP | Refills: 0 | Status: SHIPPED | OUTPATIENT
Start: 2020-03-16 | End: 2020-08-24

## 2020-03-16 ASSESSMENT — FIBROSIS 4 INDEX: FIB4 SCORE: .942809041582063366

## 2020-03-16 NOTE — LETTER
March 16, 2020       Patient: Hortencia Bonilla   YOB: 1970   Date of Visit: 3/16/2020         To Whom It May Concern:    It is my medical opinion that Hortencia Bonilla may be excused from work for the dates of 3/17/2020-3/19/2020.        If you have any questions or concerns, please don't hesitate to call [phone]          Sincerely,          Hope Villagomez, SHERITA.P.R.N.  Electronically Signed

## 2020-03-17 NOTE — PROGRESS NOTES
Subjective:      Hortencia Bonilla is a 49 y.o. female who presents with Cough (sore throat)         Cough  This is a new problem. The current episode started 4 days ago. The problem has been unchanged.  The problem occurs intermittently. The cough is dry. Associated symptoms include : fatigue, congestion, headaches, chills, muscle aches, fever, sore throat. Pertinent negatives include no   nausea, vomiting, diarrhea, sweats, weight loss or wheezing. Nothing aggravates the symptoms.  Patient has tried nothing for the symptoms. There is no history of asthma.         PMH:  has a past medical history of Bipolar disorder (HCC) and Thyroid nodule.  MEDS:   Current Outpatient Medications:   •  benzonatate (TESSALON) 100 MG Cap, Take 1 Cap by mouth 3 times a day as needed for Cough., Disp: 30 Cap, Rfl: 0  •  Cyanocobalamin (VITAMIN B12 PO), Take 2 Tabs by mouth every day., Disp: , Rfl:   •  Cholecalciferol (VITAMIN D) 2000 UNIT Tab, Take 4,000 Units by mouth every day., Disp: , Rfl:   •  MAGNESIUM PO, Take 1-2 Tabs by mouth every day., Disp: , Rfl:   •  Omega-3 Fatty Acids (OMEGA 3 PO), Take  by mouth., Disp: , Rfl:   ALLERGIES:   Allergies   Allergen Reactions   • Bloodless      SURGHX:   Past Surgical History:   Procedure Laterality Date   • PRIMARY C SECTION      x 1     SOCHX:  reports that she has never smoked. She has never used smokeless tobacco. She reports that she does not drink alcohol or use drugs.  FH: Reviewed with patient, not pertinent to this visit.         No family history on file.    Review of Systems   Constitutional: positive for fever and chills  HENT: negative for otalgia, positive for sore throat  Cardiovascular - denies chest pain or dyspnea  Respiratory: Positive for cough.  Negative for wheezing.    Neurological: Negative for headaches, dizziness   GI - denies nausea, vomiting or diarrhea   - denies dysuria, discharge  Psych - denies depression, anxiety  Neuro - denies numbness or tingling.   10  "point ROS otherwise negative, except per HPI  .ros       Objective:     /84 (BP Location: Right arm)   Pulse 82   Temp 36.8 °C (98.2 °F) (Temporal)   Resp 18   Ht 1.753 m (5' 9\")   Wt 97.5 kg (215 lb)   SpO2 99%       Physical Exam   Constitutional: patient is oriented to person, place, and time. Patient appears well-developed and well-nourished. No distress.   HENT:   Head: Normocephalic and atraumatic.   Right Ear: External ear normal.   Left Ear: External ear normal.   TMs normal  Nose: Mucosal edema and rhinorrhea  present. Right sinus exhibits no maxillary sinus tenderness. Left sinus exhibits no maxillary sinus tenderness.   Mouth/Throat: Mucous membranes are normal. No oral lesions.  There is posterior pharyngeal erythema.  No oropharyngeal exudate or posterior oropharyngeal edema.   Eyes: Conjunctivae and EOM are normal. Pupils are equal, round, and reactive to light. Right eye exhibits no discharge. Left eye exhibits no discharge. No scleral icterus.   Neck: Normal range of motion. Neck supple. No tracheal deviation present.   Cardiovascular: Normal rate, regular rhythm and normal heart sounds.  Exam reveals no friction rub.    Pulmonary/Chest: Effort normal. No respiratory distress. Patient has no wheezes or rhonchi. Patient has no rales.    Musculoskeletal:  exhibits no edema.   Lymphadenopathy:     Patient has no cervical adenopathy.      Neurological: patient is alert and oriented to person, place, and time.   Skin: Skin is warm and dry. No rash noted. No erythema.   Psychiatric: patient  has a normal mood and affect.  behavior is normal.       Assesment/Plan:    POCT Influenza A/B: SHared decision making. Declines testing.    1. Influenza-like symptoms  Discussed likely viral etiology, possibly influenza.  Vitals and exam unremarkable.  Low suspicion for pneumonia.  Discussed appropriate over-the-counter symptomatic medication, and when to return to clinic. Follow up for worsening or " persistent.  Rest, fluids encouraged.  OTC decongestant for congestion/cough  Note given for work.  Pt was in full understanding and agreement with the plan.  Differential diagnosis, natural history, supportive care, and indications for immediate follow-up discussed. All questions answered. Patient agrees with the plan of care.  Follow-up as needed if symptoms worsen or fail to improve to PCP, Urgent care or Emergency Room.    1. Cough  - benzonatate (TESSALON) 100 MG Cap; Take 1 Cap by mouth 3 times a day as needed for Cough.  Dispense: 30 Cap; Refill: 0

## 2020-07-01 ENCOUNTER — HOSPITAL ENCOUNTER (OUTPATIENT)
Dept: RADIOLOGY | Facility: MEDICAL CENTER | Age: 50
End: 2020-07-01
Attending: FAMILY MEDICINE
Payer: COMMERCIAL

## 2020-07-01 DIAGNOSIS — Z12.31 ENCOUNTER FOR SCREENING MAMMOGRAM FOR BREAST CANCER: ICD-10-CM

## 2020-07-01 PROCEDURE — 77067 SCR MAMMO BI INCL CAD: CPT

## 2020-07-02 ENCOUNTER — TELEPHONE (OUTPATIENT)
Dept: MEDICAL GROUP | Facility: PHYSICIAN GROUP | Age: 50
End: 2020-07-02

## 2020-07-02 NOTE — LETTER
July 7, 2020         Hortencia Bonilla  [address] [place] [address]        Dear Hortencia:      Below are the results from your recent visit:    Resulted Orders   MA-SCREENING MAMMO BILAT W/TOMOSYNTHESIS W/CAD    Narrative    7/1/2020 7:30 AM    HISTORY/REASON FOR EXAM:  Routine Mammographic Screening.      TECHNIQUE/EXAM DESCRIPTION:  Bilateral tomosynthesis screening mammography was performed with standard mammographic images generated from the data set. Images were reviewed and interpreted with CAD.    COMPARISON:  1/16/2019, 5/4/2015    FINDINGS:    There are scattered areas of fibroglandular density.  There is no dominant mass, suspicious calcification, or any secondary malignant sign.      Impression    1.  Breasts have scattered areas of fibroglandular density, with no radiographic evidence of malignancy and no interval change.    2.  Screening mammogram in one year is recommended.      R1 - Category 1:  Negative     The test results show that your mammogram was normal.  We can repeat it yearly.    If you have any questions or concerns, please don't hesitate to call.    Sincerely,      Florence Ruiz D.O.    Electronically Signed

## 2020-07-02 NOTE — TELEPHONE ENCOUNTER
----- Message from Florence Ruiz D.O. sent at 7/1/2020  1:58 PM PDT -----  Patient will need this message in Kittitian.    Please let her know mammogram was normal.  We can repeat it yearly.

## 2020-07-10 ENCOUNTER — APPOINTMENT (OUTPATIENT)
Dept: RADIOLOGY | Facility: MEDICAL CENTER | Age: 50
End: 2020-07-10
Attending: CHIROPRACTOR
Payer: COMMERCIAL

## 2020-08-24 ENCOUNTER — OFFICE VISIT (OUTPATIENT)
Dept: URGENT CARE | Facility: PHYSICIAN GROUP | Age: 50
End: 2020-08-24
Payer: COMMERCIAL

## 2020-08-24 ENCOUNTER — HOSPITAL ENCOUNTER (OUTPATIENT)
Facility: MEDICAL CENTER | Age: 50
End: 2020-08-24
Attending: NURSE PRACTITIONER
Payer: COMMERCIAL

## 2020-08-24 VITALS
HEART RATE: 87 BPM | WEIGHT: 199.8 LBS | DIASTOLIC BLOOD PRESSURE: 68 MMHG | OXYGEN SATURATION: 95 % | TEMPERATURE: 98.7 F | BODY MASS INDEX: 29.59 KG/M2 | RESPIRATION RATE: 20 BRPM | SYSTOLIC BLOOD PRESSURE: 128 MMHG | HEIGHT: 69 IN

## 2020-08-24 DIAGNOSIS — Z20.822 EXPOSURE TO COVID-19 VIRUS: ICD-10-CM

## 2020-08-24 DIAGNOSIS — R52 GENERALIZED BODY ACHES: ICD-10-CM

## 2020-08-24 DIAGNOSIS — Z20.822 SUSPECTED COVID-19 VIRUS INFECTION: ICD-10-CM

## 2020-08-24 DIAGNOSIS — R50.9 FEVER AND CHILLS: ICD-10-CM

## 2020-08-24 DIAGNOSIS — R43.0 LOSS OF PERCEPTION FOR SMELL: ICD-10-CM

## 2020-08-24 LAB — COVID ORDER STATUS COVID19: NORMAL

## 2020-08-24 PROCEDURE — 99214 OFFICE O/P EST MOD 30 MIN: CPT | Mod: CS | Performed by: NURSE PRACTITIONER

## 2020-08-24 PROCEDURE — U0003 INFECTIOUS AGENT DETECTION BY NUCLEIC ACID (DNA OR RNA); SEVERE ACUTE RESPIRATORY SYNDROME CORONAVIRUS 2 (SARS-COV-2) (CORONAVIRUS DISEASE [COVID-19]), AMPLIFIED PROBE TECHNIQUE, MAKING USE OF HIGH THROUGHPUT TECHNOLOGIES AS DESCRIBED BY CMS-2020-01-R: HCPCS

## 2020-08-24 ASSESSMENT — ENCOUNTER SYMPTOMS
TINGLING: 0
FATIGUE: 1
SORE THROAT: 1
CHILLS: 1
COUGH: 1
FEVER: 1
SWOLLEN GLANDS: 0
BACK PAIN: 0
WEAKNESS: 0
NAUSEA: 0
VISUAL CHANGE: 0
CONSTIPATION: 0
DIAPHORESIS: 0
HEADACHES: 1
ANOREXIA: 1
HEARTBURN: 0
VOMITING: 0
BODY ACHES: 1
MYALGIAS: 1
CHANGE IN BOWEL HABIT: 1
DIZZINESS: 0
ORTHOPNEA: 0
PALPITATIONS: 0
SHORTNESS OF BREATH: 0
NECK PAIN: 0
MEMORY LOSS: 0

## 2020-08-24 NOTE — PROGRESS NOTES
Subjective:      Hortencia Bonilla is a 50 y.o. female who presents with Diarrhea (loss of smell, bones achey, loss of appetite, headache, x3 weeks )            Patient presents with known covert exposure in a family member.  Patient reports she lost her sense of smell this morning.  Approximately 3 days ago she developed body aches and chills/fever.  Patient also reports diarrhea intermittently over last 10 days since she was exposed on the 13th of this month.  Positive for intermittent cough and sore throat.  She denies any shortness of breath or chest pain.    Generalized Body Aches  This is a new problem. The current episode started in the past 7 days. The problem occurs constantly. The problem has been waxing and waning. Associated symptoms include anorexia, a change in bowel habit ( Diarrhea), chills, coughing, fatigue, a fever ( Subjective), headaches, myalgias and a sore throat. Pertinent negatives include no chest pain, congestion, diaphoresis, nausea, neck pain, swollen glands, urinary symptoms, visual change, vomiting or weakness. Nothing aggravates the symptoms. She has tried nothing for the symptoms. The treatment provided no relief.       Review of Systems   Constitutional: Positive for chills, fatigue, fever ( Subjective) and malaise/fatigue. Negative for diaphoresis.   HENT: Positive for sore throat. Negative for congestion.         Loss of smell   Respiratory: Positive for cough. Negative for shortness of breath.    Cardiovascular: Negative for chest pain, palpitations, orthopnea and leg swelling.   Gastrointestinal: Positive for anorexia and change in bowel habit ( Diarrhea). Negative for constipation, heartburn, nausea and vomiting.   Musculoskeletal: Positive for myalgias. Negative for back pain and neck pain.   Neurological: Positive for headaches. Negative for dizziness, tingling and weakness.   Psychiatric/Behavioral: Negative for memory loss and suicidal ideas.   All other systems reviewed and  "are negative.         Objective:     /68 (BP Location: Right arm, Patient Position: Sitting, BP Cuff Size: Adult)   Pulse 87   Temp 37.1 °C (98.7 °F) (Temporal)   Resp 20   Ht 1.753 m (5' 9\")   Wt 90.6 kg (199 lb 12.8 oz)   SpO2 95%   BMI 29.51 kg/m²      Physical Exam  Vitals signs reviewed.   Constitutional:       General: She is not in acute distress.     Appearance: Normal appearance. She is not ill-appearing.   HENT:      Head: Normocephalic and atraumatic.      Right Ear: Tympanic membrane, ear canal and external ear normal.      Left Ear: Tympanic membrane, ear canal and external ear normal.      Nose: Nose normal. No congestion or rhinorrhea.      Mouth/Throat:      Mouth: Mucous membranes are moist.      Pharynx: No posterior oropharyngeal erythema.   Eyes:      Extraocular Movements: Extraocular movements intact.      Conjunctiva/sclera: Conjunctivae normal.      Pupils: Pupils are equal, round, and reactive to light.   Neck:      Musculoskeletal: Normal range of motion and neck supple.   Cardiovascular:      Rate and Rhythm: Normal rate and regular rhythm.      Pulses: Normal pulses.      Heart sounds: No murmur. No friction rub.   Pulmonary:      Effort: Pulmonary effort is normal. No respiratory distress.      Breath sounds: Normal breath sounds. No wheezing, rhonchi or rales.   Abdominal:      General: Bowel sounds are normal. There is no distension.      Palpations: Abdomen is soft. There is no mass.      Tenderness: There is no abdominal tenderness. There is no right CVA tenderness or left CVA tenderness.   Musculoskeletal: Normal range of motion.         General: No tenderness.      Right lower leg: No edema.      Left lower leg: No edema.   Lymphadenopathy:      Cervical: No cervical adenopathy.   Skin:     General: Skin is warm and dry.   Neurological:      Mental Status: She is alert and oriented to person, place, and time.   Psychiatric:         Mood and Affect: Mood normal.         " Behavior: Behavior normal.                 Assessment/Plan:         1. Suspected COVID-19 virus infection  COVID/SARS COV-2 PCR   2. Exposure to COVID-19 virus  COVID/SARS COV-2 PCR   3. Loss of perception for smell  COVID/SARS COV-2 PCR   4. Generalized body aches  COVID/SARS COV-2 PCR   5. Fever and chills  COVID/SARS COV-2 PCR     Call with Covid results.    May take over-the-counter Ibuprofen 600-800 mg every 8 hours as needed for pain/discomfort    OTC imodium PRN         Vitals are stable at this time.  Patient is advised to self isolate and provided with self isolation precautions AVS information.  Discussed when to return to urgent care or ER including for worsening shortness of breath.  Patient verbalized understanding and has no additional questions.    Plan of care, medications and treatments reviewed with patient and or guardian.  Patient and or guardian voices understanding and agrees with the instructions provided. After visit summary reviewed with patient. Patient and or guardian understand the parameters for reevaluation and ER precautions discussed.     Follow up with primary care provider in the next 1-5 days for recheck as needed.  Discussed that urgent care setting has limited resources, therefore any worsening of symptoms should be evaluated in the ER. Patient and or guardian verbalized understanding.     Please note that this dictation was created using voice recognition software. I have made every reasonable attempt to correct obvious errors, but I expect that there are errors of grammar and possibly content that I did not discover before finalizing the note.

## 2020-08-25 LAB
SARS-COV-2 RNA RESP QL NAA+PROBE: DETECTED
SPECIMEN SOURCE: ABNORMAL

## 2020-08-25 NOTE — RESULT ENCOUNTER NOTE
Patient is Japanese speaking only.  Can you please have Isaac notify patient of positive Covid results if he is working today. If not, anyone who speaks Japanese!    Thank you,  Hope

## 2020-08-26 ENCOUNTER — NURSE TRIAGE (OUTPATIENT)
Dept: HEALTH INFORMATION MANAGEMENT | Facility: OTHER | Age: 50
End: 2020-08-26

## 2020-08-26 NOTE — TELEPHONE ENCOUNTER
Pt is + for covid, [facility], need to send something to work.  Connected caller w/lab @ [place] office to get copy of lab result.

## 2020-11-30 ENCOUNTER — OFFICE VISIT (OUTPATIENT)
Dept: MEDICAL GROUP | Facility: PHYSICIAN GROUP | Age: 50
End: 2020-11-30
Payer: COMMERCIAL

## 2020-11-30 ENCOUNTER — HOSPITAL ENCOUNTER (OUTPATIENT)
Facility: MEDICAL CENTER | Age: 50
End: 2020-11-30
Attending: FAMILY MEDICINE
Payer: COMMERCIAL

## 2020-11-30 VITALS
HEART RATE: 64 BPM | HEIGHT: 69 IN | TEMPERATURE: 97.9 F | WEIGHT: 190 LBS | BODY MASS INDEX: 28.14 KG/M2 | SYSTOLIC BLOOD PRESSURE: 120 MMHG | OXYGEN SATURATION: 97 % | DIASTOLIC BLOOD PRESSURE: 70 MMHG

## 2020-11-30 DIAGNOSIS — M54.6 CHRONIC BILATERAL THORACIC BACK PAIN: ICD-10-CM

## 2020-11-30 DIAGNOSIS — G89.29 CHRONIC BILATERAL THORACIC BACK PAIN: ICD-10-CM

## 2020-11-30 DIAGNOSIS — Z12.4 PAP SMEAR FOR CERVICAL CANCER SCREENING: Primary | ICD-10-CM

## 2020-11-30 DIAGNOSIS — N64.4 BREAST PAIN, LEFT: ICD-10-CM

## 2020-11-30 PROCEDURE — 99214 OFFICE O/P EST MOD 30 MIN: CPT | Performed by: FAMILY MEDICINE

## 2020-11-30 PROCEDURE — 88175 CYTOPATH C/V AUTO FLUID REDO: CPT

## 2020-11-30 PROCEDURE — 99396 PREV VISIT EST AGE 40-64: CPT | Mod: 25 | Performed by: FAMILY MEDICINE

## 2020-11-30 PROCEDURE — 87624 HPV HI-RISK TYP POOLED RSLT: CPT

## 2020-12-01 DIAGNOSIS — Z12.4 PAP SMEAR FOR CERVICAL CANCER SCREENING: ICD-10-CM

## 2020-12-01 NOTE — PROGRESS NOTES
CC: Pap smear, back pain, breast pain    HISTORY OF THE PRESENT ILLNESS: Patient is a 50 y.o. female. This pleasant patient is here today to discuss the following issues.    Entire encounter was performed using iPad  in Brazilian.    Patient is primarily here today for Pap smear but also has a couple of other concerns.    With regard to her well woman exam, she does get Pap smears or a pelvic exam yearly.  She denies any histories of abnormal Pap smears.  She has not had her menstrual cycle for 7 years at this time and denies any postmenopausal bleeding.  She denies any concerns such as pelvic pain, dysuria, vaginal discharge.  No family history of breast or ovarian cancer that she knows of.    She is complaining of left-sided breast pain today.  She did have a normal screening mammogram in July 2020.  Pain was there at that time though seems to have worsened.  Pain is located on the lateral left breast.  She has no other breast concerns such as lumps, masses or nipple discharge.    Also complaining of back pain.  Notes that she has had chronic thoracic bilateral back pain for many years now.  Notes pain to be severe and stabbing.  Dating back to 2013, she had a x-ray which showed mild levoscoliosis of thoracolumbar spine in addition to mild disc space narrowing as well as prominent osteophytes typical of DISH.  She reports that she went to physical therapy and that did not help so she does not desire to go back.  She is not interested in MRI as she states that she would panic.  She apparently is also not interested in surgery or injections.  She wants to see a nonoperative specialist at this time.    Allergies: Bloodless    Current Outpatient Medications Ordered in Epic   Medication Sig Dispense Refill   • Cyanocobalamin (VITAMIN B12 PO) Take 2 Tabs by mouth every day.     • Cholecalciferol (VITAMIN D) 2000 UNIT Tab Take 4,000 Units by mouth every day.     • MAGNESIUM PO Take 1-2 Tabs by mouth every day.    "  • Omega-3 Fatty Acids (OMEGA 3 PO) Take  by mouth.       No current Epic-ordered facility-administered medications on file.        Past Medical History:   Diagnosis Date   • Bipolar disorder (HCC)    • Thyroid nodule        Past Surgical History:   Procedure Laterality Date   • PRIMARY C SECTION      x 1       Social History     Tobacco Use   • Smoking status: Never Smoker   • Smokeless tobacco: Never Used   Substance Use Topics   • Alcohol use: No   • Drug use: No       Social History     Social History Narrative   • Not on file       Family History   Problem Relation Age of Onset   • Other Brother         OCD (Davon) half brother       ROS:     - Constitutional: Negative for fever, chills, unexpected weight change, and fatigue/generalized weakness.     - HEENT: Negative for headaches, vision changes, hearing changes, ear pain, ear discharge, rhinorrhea, sinus congestion, sore throat, and neck pain.      - Respiratory: Negative for cough, sputum production, chest congestion, dyspnea, wheezing, and crackles.      Exam: /70 (BP Location: Left arm, Patient Position: Sitting, BP Cuff Size: Adult)   Pulse 64   Temp 36.6 °C (97.9 °F) (Temporal)   Ht 1.753 m (5' 9\")   Wt 86.2 kg (190 lb)   SpO2 97%  Body mass index is 28.06 kg/m².    General: Well appearing, NAD  Breast: Breasts normal in appearance.  No palpable lumps or masses.  No axillary lymphadenopathy.  : Normal external genitalia.  Normal cervix and vagina.  No cervical motion tenderness, adnexal tenderness, adnexal masses.  Skin: Warm and dry.  No obvious lesions.  Musculoskeletal: Spine appears grossly well aligned.  No tenderness to palpation midline or bilaterally and paraspinals.  Full range of motion.  Psych: Normal mood and affect. Alert and oriented. Judgment and insight is normal.    Please note that this dictation was created using voice recognition software. I have made every reasonable attempt to correct obvious errors, but I expect that " there are errors of grammar and possibly content that I did not discover before finalizing the note.      Assessment/Plan  Hortencia was seen today for gynecologic exam.    Diagnoses and all orders for this visit:    Pap smear for cervical cancer screening  Pap smear performed today.  No concerns on review of systems or exam except breast pain as noted below.  -     THINPREP PAP WITH HPV; Future    Breast pain, left  Exam normal.  We will go ahead and order diagnostic mammogram and ultrasound for further evaluation.  -     MA DIAGNOSTIC MAMMO LEFT W/CAD; Future  -     US-BREAST LIMITED-LEFT; Future    Chronic bilateral thoracic back pain  This is been an ongoing issue for her.  She declines physical therapy, MRI, referral to spine specialist.  She is open to referral to sports medicine at this time for further evaluation.  I did let her know that it is very possible they will refer her themselves to spine specialist or recommend MRI.  -     REFERRAL TO SPORTS MEDICINE      Follow-up in 6 months or sooner if needed.    Florence Ruiz, DO  [facility]

## 2020-12-02 LAB
CYTOLOGY REG CYTOL: NORMAL
HPV HR 12 DNA CVX QL NAA+PROBE: NEGATIVE
HPV16 DNA SPEC QL NAA+PROBE: NEGATIVE
HPV18 DNA SPEC QL NAA+PROBE: NEGATIVE
SPECIMEN SOURCE: NORMAL

## 2020-12-04 ENCOUNTER — TELEPHONE (OUTPATIENT)
Dept: MEDICAL GROUP | Facility: PHYSICIAN GROUP | Age: 50
End: 2020-12-04

## 2020-12-04 NOTE — TELEPHONE ENCOUNTER
----- Message from Florence Ruiz D.O. sent at 12/3/2020  1:35 PM PST -----  Please call pt and let her know pap smear was normal.

## 2020-12-04 NOTE — TELEPHONE ENCOUNTER
Phone call no Voice mail.    Tried calling cell phone number but number is incorrect in the voicemail it says its [employer] stock

## 2021-01-12 ENCOUNTER — OFFICE VISIT (OUTPATIENT)
Dept: URGENT CARE | Facility: PHYSICIAN GROUP | Age: 51
End: 2021-01-12
Payer: COMMERCIAL

## 2021-01-12 VITALS
BODY MASS INDEX: 28.97 KG/M2 | HEART RATE: 65 BPM | WEIGHT: 195.6 LBS | TEMPERATURE: 97 F | SYSTOLIC BLOOD PRESSURE: 112 MMHG | OXYGEN SATURATION: 97 % | DIASTOLIC BLOOD PRESSURE: 62 MMHG | HEIGHT: 69 IN | RESPIRATION RATE: 18 BRPM

## 2021-01-12 DIAGNOSIS — R30.0 DYSURIA: ICD-10-CM

## 2021-01-12 LAB
APPEARANCE UR: CLEAR
BILIRUB UR STRIP-MCNC: NEGATIVE MG/DL
COLOR UR AUTO: YELLOW
GLUCOSE UR STRIP.AUTO-MCNC: NEGATIVE MG/DL
KETONES UR STRIP.AUTO-MCNC: NEGATIVE MG/DL
LEUKOCYTE ESTERASE UR QL STRIP.AUTO: NORMAL
NITRITE UR QL STRIP.AUTO: NEGATIVE
PH UR STRIP.AUTO: 5.5 [PH] (ref 5–8)
PROT UR QL STRIP: NEGATIVE MG/DL
RBC UR QL AUTO: NORMAL
SP GR UR STRIP.AUTO: 1.01
UROBILINOGEN UR STRIP-MCNC: 0.2 MG/DL

## 2021-01-12 PROCEDURE — 81002 URINALYSIS NONAUTO W/O SCOPE: CPT | Performed by: PHYSICIAN ASSISTANT

## 2021-01-12 PROCEDURE — 99213 OFFICE O/P EST LOW 20 MIN: CPT | Performed by: PHYSICIAN ASSISTANT

## 2021-01-12 RX ORDER — NITROFURANTOIN 25; 75 MG/1; MG/1
100 CAPSULE ORAL EVERY 12 HOURS
Qty: 10 CAP | Refills: 0 | Status: SHIPPED | OUTPATIENT
Start: 2021-01-12 | End: 2021-01-17

## 2021-01-12 RX ORDER — PHENAZOPYRIDINE HYDROCHLORIDE 200 MG/1
200 TABLET, FILM COATED ORAL 3 TIMES DAILY
Qty: 6 TAB | Refills: 0 | Status: SHIPPED | OUTPATIENT
Start: 2021-01-12 | End: 2021-01-14

## 2021-01-12 ASSESSMENT — ENCOUNTER SYMPTOMS
FLANK PAIN: 0
SHORTNESS OF BREATH: 0
VOMITING: 0
NAUSEA: 0
ABDOMINAL PAIN: 0
CHILLS: 0
PALPITATIONS: 0
BACK PAIN: 0
COUGH: 0
FEVER: 0

## 2021-01-13 NOTE — PROGRESS NOTES
Subjective:   Hortencia Bonilla is a 50 y.o. female who presents for Painful Urination (burning, urinary frequency x2 days)      UTI  This is a new problem. The current episode started in the past 7 days. The problem occurs constantly. The problem has been unchanged. Associated symptoms include urinary symptoms. Pertinent negatives include no abdominal pain, chest pain, chills, coughing, fever, nausea or vomiting. Nothing aggravates the symptoms. She has tried nothing for the symptoms.       Review of Systems   Constitutional: Negative for chills and fever.   Respiratory: Negative for cough and shortness of breath.    Cardiovascular: Negative for chest pain and palpitations.   Gastrointestinal: Negative for abdominal pain, nausea and vomiting.   Genitourinary: Positive for dysuria, frequency and urgency. Negative for flank pain and hematuria.   Musculoskeletal: Negative for back pain.   All other systems reviewed and are negative.      Medications:    • MAGNESIUM PO  • nitrofurantoin Caps  • OMEGA 3 PO  • phenazopyridine Tabs  • VITAMIN B12 PO  • vitamin D Tabs    Allergies: Bloodless    Problem List: Hortencia Bonilla has Degenerative joint disease (DJD) of lumbar spine; Mass of left side of neck; New daily persistent headache; History of seizure; Bipolar disorder (HCC); Thyroid nodule; and Colon polyp on their problem list.    Surgical History:  Past Surgical History:   Procedure Laterality Date   • PRIMARY C SECTION      x 1       Past Social Hx: Hortencia Bonilla  reports that she has never smoked. She has never used smokeless tobacco. She reports that she does not drink alcohol or use drugs.     Past Family Hx:  Hortencia Bonilla family history includes Other in her brother.     Problem list, medications, and allergies reviewed by myself today in Epic.     Objective:     Blood Pressure 112/62 (BP Location: Right arm, Patient Position: Sitting, BP Cuff Size: Adult)   Pulse 65   Temperature 36.1 °C (97 °F) (Temporal)    "Respiration 18   Height 1.753 m (5' 9\")   Weight 88.7 kg (195 lb 9.6 oz)   Oxygen Saturation 97%   Body Mass Index 28.89 kg/m²     Physical Exam  Vitals signs reviewed.   Constitutional:       Appearance: She is well-developed.   HENT:      Head: Normocephalic and atraumatic.      Right Ear: External ear normal.      Left Ear: External ear normal.      Nose: Nose normal.   Neck:      Musculoskeletal: Normal range of motion and neck supple.   Cardiovascular:      Rate and Rhythm: Normal rate and regular rhythm.      Heart sounds: Normal heart sounds.   Pulmonary:      Effort: Pulmonary effort is normal.      Breath sounds: Normal breath sounds.   Abdominal:      General: Bowel sounds are normal. There is no distension.      Palpations: Abdomen is soft. There is no mass.      Tenderness: There is no abdominal tenderness. There is no right CVA tenderness, left CVA tenderness, guarding or rebound.      Hernia: No hernia is present.   Skin:     General: Skin is warm and dry.   Neurological:      Mental Status: She is alert and oriented to person, place, and time.   Psychiatric:         Behavior: Behavior normal.         Thought Content: Thought content normal.         Judgment: Judgment normal.         Assessment/Plan:     Medical Decision Making/Comments     Pt is a 50 yr old female who presents for evaluation of dysuria.  Pt states she has had dysuria, frequency, and urgency for 3 days.  Denies fevers, flank pain/chills, nausea/vomiting, or vaginal discharge.  Pt is not pregnant, diabetic or immunocompromised.  No use of catheters.  Vital signs normal.  Pt does not appear ill or altered mental status.  There is no PTP of the abdomen or CVA tenderness.  UA is suggestive of bacteriuria.     Diagnosis and associated orders     1. Dysuria  POCT Urinalysis    nitrofurantoin (MACROBID) 100 MG Cap    phenazopyridine (PYRIDIUM) 200 MG Tab              Differential diagnosis, natural history, supportive care, and " indications for immediate follow-up discussed.    Advised the patient to follow-up with the primary care physician for recheck, reevaluation, and consideration of further management.    Please note that this dictation was created using voice recognition software. I have made a reasonable attempt to correct obvious errors, but I expect that there are errors of grammar and possibly content that I did not discover before finalizing the note.

## 2021-02-09 ENCOUNTER — OFFICE VISIT (OUTPATIENT)
Dept: MEDICAL GROUP | Facility: PHYSICIAN GROUP | Age: 51
End: 2021-02-09
Payer: COMMERCIAL

## 2021-02-09 ENCOUNTER — HOSPITAL ENCOUNTER (OUTPATIENT)
Facility: MEDICAL CENTER | Age: 51
End: 2021-02-09
Attending: FAMILY MEDICINE
Payer: COMMERCIAL

## 2021-02-09 VITALS
TEMPERATURE: 97.8 F | HEIGHT: 69 IN | OXYGEN SATURATION: 97 % | WEIGHT: 197 LBS | HEART RATE: 68 BPM | BODY MASS INDEX: 29.18 KG/M2 | DIASTOLIC BLOOD PRESSURE: 64 MMHG | SYSTOLIC BLOOD PRESSURE: 114 MMHG

## 2021-02-09 DIAGNOSIS — E04.1 THYROID NODULE: ICD-10-CM

## 2021-02-09 DIAGNOSIS — R30.0 DYSURIA: ICD-10-CM

## 2021-02-09 DIAGNOSIS — Z00.00 ENCOUNTER FOR PREVENTIVE CARE: ICD-10-CM

## 2021-02-09 DIAGNOSIS — R22.1 MASS OF LEFT SIDE OF NECK: ICD-10-CM

## 2021-02-09 LAB
APPEARANCE UR: CLEAR
BILIRUB UR QL STRIP.AUTO: NEGATIVE
COLOR UR: YELLOW
EPI CELLS #/AREA URNS HPF: ABNORMAL /HPF
GLUCOSE UR STRIP.AUTO-MCNC: NEGATIVE MG/DL
KETONES UR STRIP.AUTO-MCNC: NEGATIVE MG/DL
LEUKOCYTE ESTERASE UR QL STRIP.AUTO: ABNORMAL
MICRO URNS: ABNORMAL
NITRITE UR QL STRIP.AUTO: NEGATIVE
PH UR STRIP.AUTO: 5.5 [PH] (ref 5–8)
PROT UR QL STRIP: NEGATIVE MG/DL
RBC UR QL AUTO: NEGATIVE
RENAL EPI CELLS #/AREA URNS HPF: ABNORMAL /HPF
SP GR UR STRIP.AUTO: 1
TRANS CELLS #/AREA URNS HPF: ABNORMAL /HPF
UROBILINOGEN UR STRIP.AUTO-MCNC: 0.2 MG/DL
WBC #/AREA URNS HPF: ABNORMAL /HPF

## 2021-02-09 PROCEDURE — 87086 URINE CULTURE/COLONY COUNT: CPT

## 2021-02-09 PROCEDURE — 99214 OFFICE O/P EST MOD 30 MIN: CPT | Performed by: FAMILY MEDICINE

## 2021-02-09 PROCEDURE — 81001 URINALYSIS AUTO W/SCOPE: CPT

## 2021-02-10 NOTE — PROGRESS NOTES
CC: Dysuria    HISTORY OF THE PRESENT ILLNESS: Patient is a 50 y.o. female.     Entire encounter was performed using iPad  in Albanian.    Patient is here today with primary concern of dysuria.  She was seen in urgent care for this issue on January 12, 2021.  At that time urinalysis was performed which showed trace leukocyte esterase.  No urine culture was sent.  She was put on Macrobid for 5 days.  She reports that she had no symptomatic improvement on the Macrobid even though she completed the course.  She is continued to have sensation of bladder fullness and dysuria.  She denies other symptoms such as hematuria, more frequent urination, flank pain, fevers, chills.  No vaginal discharge.    Patient is also requesting repeat ultrasound on her neck.  She has a mass on the left side of her neck which is thought to be benign based on previous ultrasounds in addition to a thyroid nodule.  Last imaging was done in 2019 and showed no change in either the nodule or the mass in the neck.  Patient reports they have been present for 10 years and have not changed in size.  She is requesting repeat ultrasound at this time though she notes that sometimes she has had a bit of a globus sensation when swallowing.    Allergies: Bloodless    Current Outpatient Medications Ordered in Epic   Medication Sig Dispense Refill   • Cyanocobalamin (VITAMIN B12 PO) Take 2 Tabs by mouth every day.     • Cholecalciferol (VITAMIN D) 2000 UNIT Tab Take 4,000 Units by mouth every day.     • MAGNESIUM PO Take 1-2 Tabs by mouth every day.     • Omega-3 Fatty Acids (OMEGA 3 PO) Take  by mouth.       No current Epic-ordered facility-administered medications on file.        Past Medical History:   Diagnosis Date   • Bipolar disorder (HCC)    • Thyroid nodule        Past Surgical History:   Procedure Laterality Date   • PRIMARY C SECTION      x 1       Social History     Tobacco Use   • Smoking status: Never Smoker   • Smokeless tobacco: Never  "Used   Substance Use Topics   • Alcohol use: No   • Drug use: No       Social History     Social History Narrative   • Not on file       Family History   Problem Relation Age of Onset   • Other Brother         OCD (Davon) half brother       ROS:   Denies fever, chest pain, shortness of breath    Exam: /64 (BP Location: Right arm, Patient Position: Sitting, BP Cuff Size: Adult)   Pulse 68   Temp 36.6 °C (97.8 °F) (Temporal)   Ht 1.753 m (5' 9\")   Wt 89.4 kg (197 lb)   SpO2 97%  Body mass index is 29.09 kg/m².    General: Well appearing, NAD  Neck: Supple without JVD.  Thyroid nodule palpated just right of midline.  She also has a mobile, hard mass approximately the size of a marble in her left submandibular area.  Pulmonary: Clear to ausculation.  Normal effort. No rales, ronchi, or wheezing.  Cardiovascular: Regular rate and rhythm without murmur, rubs or gallop.   Abdomen: Mild suprapubic discomfort noted with palpation.  Soft,  nondistended. Normal bowel sounds. Liver and spleen are not palpable. No rebound or guarding  Neurologic: normal gait  Lymph: No cervical, supraclavicular lymph nodes are palpable  Skin: Warm and dry.  No obvious lesions.  Musculoskeletal:  No extremity cyanosis, clubbing, or edema.  Psych: Normal mood and affect. Alert and oriented. Judgment and insight is normal.    Please note that this dictation was created using voice recognition software. I have made every reasonable attempt to correct obvious errors, but I expect that there are errors of grammar and possibly content that I did not discover before finalizing the note.      Assessment/Plan  Hortencia was seen today for follow-up.    Diagnoses and all orders for this visit:    Dysuria  Acute problem, apparently ongoing though since January.  Recommend we send urinalysis and urine culture to determine what the appropriate antibiotic would be.  -     URINALYSIS; Future  -     URINE CULTURE(NEW); Future    Mass of left side of " neck  Chronic issue, on previous imaging mass thought to be benign.  We will go ahead and repeat at this time in addition to lab work.  -     US-SOFT TISSUES OF HEAD - NECK; Future  -     CBC WITH DIFFERENTIAL; Future  -     TSH+FREE T4    Thyroid nodule  Chronic issue, will repeat to ensure no change given patient's new complaint of globus sensation.  -     US-SOFT TISSUES OF HEAD - NECK; Future  -     CBC WITH DIFFERENTIAL; Future  -     TSH+FREE T4    Encounter for preventive care  -     CBC WITH DIFFERENTIAL; Future  -     TSH+FREE T4  -     Comp Metabolic Panel; Future  -     VITAMIN D,25 HYDROXY; Future  -     Lipid Profile; Future      Follow-up in about 2 to 4 weeks for lab and ultrasound review.    Florence Ruiz,   [facility]

## 2021-02-12 ENCOUNTER — TELEPHONE (OUTPATIENT)
Dept: MEDICAL GROUP | Facility: PHYSICIAN GROUP | Age: 51
End: 2021-02-12

## 2021-02-12 LAB
BACTERIA UR CULT: NORMAL
SIGNIFICANT IND 70042: NORMAL
SITE SITE: NORMAL
SOURCE SOURCE: NORMAL

## 2021-02-12 NOTE — TELEPHONE ENCOUNTER
----- Message from Florence Ruiz D.O. sent at 2/12/2021  9:56 AM PST -----  Patient will need this message in Austrian    Please call her and let her know that her urine test was negative for infection.  If she continues to have pain with urination, we may need to get her referred to a urologist.  We can further discuss at her appointment next month.

## 2021-02-13 ENCOUNTER — HOSPITAL ENCOUNTER (OUTPATIENT)
Dept: LAB | Facility: MEDICAL CENTER | Age: 51
End: 2021-02-13
Attending: FAMILY MEDICINE
Payer: COMMERCIAL

## 2021-02-13 DIAGNOSIS — Z00.00 ENCOUNTER FOR PREVENTIVE CARE: ICD-10-CM

## 2021-02-13 DIAGNOSIS — R22.1 MASS OF LEFT SIDE OF NECK: ICD-10-CM

## 2021-02-13 DIAGNOSIS — E04.1 THYROID NODULE: ICD-10-CM

## 2021-02-13 LAB
25(OH)D3 SERPL-MCNC: 54 NG/ML (ref 30–100)
ALBUMIN SERPL BCP-MCNC: 4.1 G/DL (ref 3.2–4.9)
ALBUMIN/GLOB SERPL: 1.2 G/DL
ALP SERPL-CCNC: 75 U/L (ref 30–99)
ALT SERPL-CCNC: 49 U/L (ref 2–50)
ANION GAP SERPL CALC-SCNC: 9 MMOL/L (ref 7–16)
AST SERPL-CCNC: 36 U/L (ref 12–45)
BASOPHILS # BLD AUTO: 0.3 % (ref 0–1.8)
BASOPHILS # BLD: 0.02 K/UL (ref 0–0.12)
BILIRUB SERPL-MCNC: 0.5 MG/DL (ref 0.1–1.5)
BUN SERPL-MCNC: 12 MG/DL (ref 8–22)
CALCIUM SERPL-MCNC: 9.4 MG/DL (ref 8.5–10.5)
CHLORIDE SERPL-SCNC: 105 MMOL/L (ref 96–112)
CHOLEST SERPL-MCNC: 243 MG/DL (ref 100–199)
CO2 SERPL-SCNC: 27 MMOL/L (ref 20–33)
CREAT SERPL-MCNC: 0.64 MG/DL (ref 0.5–1.4)
EOSINOPHIL # BLD AUTO: 0.13 K/UL (ref 0–0.51)
EOSINOPHIL NFR BLD: 1.7 % (ref 0–6.9)
ERYTHROCYTE [DISTWIDTH] IN BLOOD BY AUTOMATED COUNT: 43.7 FL (ref 35.9–50)
FASTING STATUS PATIENT QL REPORTED: NORMAL
GLOBULIN SER CALC-MCNC: 3.5 G/DL (ref 1.9–3.5)
GLUCOSE SERPL-MCNC: 79 MG/DL (ref 65–99)
HCT VFR BLD AUTO: 42.4 % (ref 37–47)
HDLC SERPL-MCNC: 71 MG/DL
HGB BLD-MCNC: 13.5 G/DL (ref 12–16)
IMM GRANULOCYTES # BLD AUTO: 0.02 K/UL (ref 0–0.11)
IMM GRANULOCYTES NFR BLD AUTO: 0.3 % (ref 0–0.9)
LDLC SERPL CALC-MCNC: 145 MG/DL
LYMPHOCYTES # BLD AUTO: 3.39 K/UL (ref 1–4.8)
LYMPHOCYTES NFR BLD: 43.5 % (ref 22–41)
MCH RBC QN AUTO: 30 PG (ref 27–33)
MCHC RBC AUTO-ENTMCNC: 31.8 G/DL (ref 33.6–35)
MCV RBC AUTO: 94.2 FL (ref 81.4–97.8)
MONOCYTES # BLD AUTO: 0.44 K/UL (ref 0–0.85)
MONOCYTES NFR BLD AUTO: 5.6 % (ref 0–13.4)
NEUTROPHILS # BLD AUTO: 3.79 K/UL (ref 2–7.15)
NEUTROPHILS NFR BLD: 48.6 % (ref 44–72)
NRBC # BLD AUTO: 0 K/UL
NRBC BLD-RTO: 0 /100 WBC
PLATELET # BLD AUTO: 274 K/UL (ref 164–446)
PMV BLD AUTO: 11.2 FL (ref 9–12.9)
POTASSIUM SERPL-SCNC: 4 MMOL/L (ref 3.6–5.5)
PROT SERPL-MCNC: 7.6 G/DL (ref 6–8.2)
RBC # BLD AUTO: 4.5 M/UL (ref 4.2–5.4)
SODIUM SERPL-SCNC: 141 MMOL/L (ref 135–145)
T4 FREE SERPL-MCNC: 1.37 NG/DL (ref 0.93–1.7)
TRIGL SERPL-MCNC: 136 MG/DL (ref 0–149)
TSH SERPL DL<=0.005 MIU/L-ACNC: 1.82 UIU/ML (ref 0.38–5.33)
WBC # BLD AUTO: 7.8 K/UL (ref 4.8–10.8)

## 2021-02-13 PROCEDURE — 82306 VITAMIN D 25 HYDROXY: CPT

## 2021-02-13 PROCEDURE — 84443 ASSAY THYROID STIM HORMONE: CPT

## 2021-02-13 PROCEDURE — 85025 COMPLETE CBC W/AUTO DIFF WBC: CPT

## 2021-02-13 PROCEDURE — 36415 COLL VENOUS BLD VENIPUNCTURE: CPT

## 2021-02-13 PROCEDURE — 84439 ASSAY OF FREE THYROXINE: CPT

## 2021-02-13 PROCEDURE — 80061 LIPID PANEL: CPT

## 2021-02-13 PROCEDURE — 80053 COMPREHEN METABOLIC PANEL: CPT

## 2021-02-17 ENCOUNTER — TELEPHONE (OUTPATIENT)
Dept: MEDICAL GROUP | Facility: PHYSICIAN GROUP | Age: 51
End: 2021-02-17

## 2021-02-17 NOTE — TELEPHONE ENCOUNTER
----- Message from Florence Ruiz D.O. sent at 2/16/2021  6:13 PM PST -----  Patient will need this message in Greek.    Please call patient let her know that all of her labs were normal with the exception of very elevated cholesterol.  We can further discuss at her upcoming appointment next month.

## 2021-03-09 ENCOUNTER — HOSPITAL ENCOUNTER (OUTPATIENT)
Dept: RADIOLOGY | Facility: MEDICAL CENTER | Age: 51
End: 2021-03-09
Attending: FAMILY MEDICINE
Payer: COMMERCIAL

## 2021-03-09 DIAGNOSIS — N64.4 BREAST PAIN, LEFT: ICD-10-CM

## 2021-03-09 DIAGNOSIS — R22.1 MASS OF LEFT SIDE OF NECK: ICD-10-CM

## 2021-03-09 DIAGNOSIS — E04.1 THYROID NODULE: ICD-10-CM

## 2021-03-09 PROCEDURE — G0279 TOMOSYNTHESIS, MAMMO: HCPCS

## 2021-03-09 PROCEDURE — 76536 US EXAM OF HEAD AND NECK: CPT

## 2021-03-09 PROCEDURE — 76642 ULTRASOUND BREAST LIMITED: CPT | Mod: LT

## 2021-03-12 DIAGNOSIS — E04.1 THYROID NODULE: ICD-10-CM

## 2021-03-26 ENCOUNTER — OFFICE VISIT (OUTPATIENT)
Dept: MEDICAL GROUP | Facility: PHYSICIAN GROUP | Age: 51
End: 2021-03-26
Payer: COMMERCIAL

## 2021-03-26 VITALS
DIASTOLIC BLOOD PRESSURE: 64 MMHG | SYSTOLIC BLOOD PRESSURE: 122 MMHG | TEMPERATURE: 97.3 F | OXYGEN SATURATION: 97 % | WEIGHT: 196 LBS | BODY MASS INDEX: 29.03 KG/M2 | HEIGHT: 69 IN | HEART RATE: 75 BPM

## 2021-03-26 DIAGNOSIS — E78.00 HIGH CHOLESTEROL: ICD-10-CM

## 2021-03-26 DIAGNOSIS — E04.1 THYROID NODULE: ICD-10-CM

## 2021-03-26 PROCEDURE — 99213 OFFICE O/P EST LOW 20 MIN: CPT | Performed by: FAMILY MEDICINE

## 2021-03-26 ASSESSMENT — FIBROSIS 4 INDEX: FIB4 SCORE: 0.98

## 2021-03-26 NOTE — PROGRESS NOTES
"CC: Thyroid nodule    HISTORY OF THE PRESENT ILLNESS: Patient is a 50 y.o. female.     Patient is here today for follow-up on labs and imaging.    Labs were all essentially within normal limits with the exception of cholesterol which was moderately elevated.    Mammography was normal.    The only thing abnormal was that she had a TR 4 nodule with recommended nodule biopsy.  She has had thyroid nodules for a long time, but recent ultrasound recommended biopsy due to increase in size.  Referral has already been placed to ENT.  She reports she has not heard back yet.    Allergies: Bloodless    Current Outpatient Medications Ordered in Epic   Medication Sig Dispense Refill   • Cyanocobalamin (VITAMIN B12 PO) Take 2 Tabs by mouth every day.     • Cholecalciferol (VITAMIN D) 2000 UNIT Tab Take 4,000 Units by mouth every day.     • MAGNESIUM PO Take 1-2 Tabs by mouth every day.     • Omega-3 Fatty Acids (OMEGA 3 PO) Take  by mouth.       No current Epic-ordered facility-administered medications on file.       Past Medical History:   Diagnosis Date   • Bipolar disorder (HCC)    • Thyroid nodule        Past Surgical History:   Procedure Laterality Date   • PRIMARY C SECTION      x 1       Social History     Tobacco Use   • Smoking status: Never Smoker   • Smokeless tobacco: Never Used   Substance Use Topics   • Alcohol use: No   • Drug use: No       Social History     Social History Narrative   • Not on file       Family History   Problem Relation Age of Onset   • Other Brother         OCD ([place]) half brother       ROS:   Denies fever, chest pain, shortness of breath      Labs: Labs reviewed from February 13, 2021.  Imaging: Mammography and breast ultrasound reviewed from March 9, 2021.  Thyroid ultrasound reviewed from March 9, 2021.    Exam: /64 (BP Location: Right arm, Patient Position: Sitting, BP Cuff Size: Adult)   Pulse 75   Temp 36.3 °C (97.3 °F) (Temporal)   Ht 1.753 m (5' 9\")   Wt 88.9 kg (196 lb)   SpO2 " 97%  Body mass index is 28.94 kg/m².    General: Well appearing, NAD  Skin: Warm and dry.  No obvious lesions.  Musculoskeletal:  No extremity cyanosis, clubbing, or edema.  Psych: Normal mood and affect. Alert and oriented. Judgment and insight is normal.    Please note that this dictation was created using voice recognition software. I have made every reasonable attempt to correct obvious errors, but I expect that there are errors of grammar and possibly content that I did not discover before finalizing the note.      Assessment/Plan  Hortencia was seen today for follow-up.    Diagnoses and all orders for this visit:    High cholesterol  Noted on recent labs with total cholesterol 243 and LDL of 145.  Recommend cutting back on foods high in saturated fats as well as increasing physical activity which was discussed during today's visit.  If remains high or higher at next check, may consider low-dose statin.    Thyroid nodule  TR 4 nodule noted to need biopsy.  ENT referral placed in number given to patient today to call to get scheduled.    Follow-up in 1 year sooner if needed.    Florence Ruiz DO  [facility]

## 2022-09-30 ENCOUNTER — OFFICE VISIT (OUTPATIENT)
Dept: MEDICAL GROUP | Facility: PHYSICIAN GROUP | Age: 52
End: 2022-09-30
Payer: COMMERCIAL

## 2022-09-30 VITALS
HEIGHT: 68 IN | WEIGHT: 195.2 LBS | SYSTOLIC BLOOD PRESSURE: 122 MMHG | RESPIRATION RATE: 18 BRPM | DIASTOLIC BLOOD PRESSURE: 88 MMHG | BODY MASS INDEX: 29.58 KG/M2 | OXYGEN SATURATION: 97 % | TEMPERATURE: 97.1 F | HEART RATE: 62 BPM

## 2022-09-30 DIAGNOSIS — E78.00 HIGH CHOLESTEROL: ICD-10-CM

## 2022-09-30 DIAGNOSIS — N89.8 VAGINAL DRYNESS: ICD-10-CM

## 2022-09-30 DIAGNOSIS — G89.29 CHRONIC BILATERAL THORACIC BACK PAIN: ICD-10-CM

## 2022-09-30 DIAGNOSIS — Z00.00 WELLNESS EXAMINATION: ICD-10-CM

## 2022-09-30 DIAGNOSIS — Z12.31 ENCOUNTER FOR SCREENING MAMMOGRAM FOR BREAST CANCER: ICD-10-CM

## 2022-09-30 DIAGNOSIS — Z23 NEED FOR VACCINATION: ICD-10-CM

## 2022-09-30 DIAGNOSIS — M54.6 CHRONIC BILATERAL THORACIC BACK PAIN: ICD-10-CM

## 2022-09-30 DIAGNOSIS — Z78.0 MENOPAUSE: ICD-10-CM

## 2022-09-30 DIAGNOSIS — Z12.4 SCREENING FOR CERVICAL CANCER: ICD-10-CM

## 2022-09-30 DIAGNOSIS — R22.1 MASS OF LEFT SIDE OF NECK: ICD-10-CM

## 2022-09-30 DIAGNOSIS — Z13.79 GENETIC SCREENING: ICD-10-CM

## 2022-09-30 PROCEDURE — 90471 IMMUNIZATION ADMIN: CPT

## 2022-09-30 PROCEDURE — 90686 IIV4 VACC NO PRSV 0.5 ML IM: CPT

## 2022-09-30 PROCEDURE — 99215 OFFICE O/P EST HI 40 MIN: CPT | Mod: 25

## 2022-09-30 PROCEDURE — 99417 PROLNG OP E/M EACH 15 MIN: CPT

## 2022-09-30 ASSESSMENT — ENCOUNTER SYMPTOMS
FEVER: 0
NERVOUS/ANXIOUS: 1
COUGH: 0
CHILLS: 0
PALPITATIONS: 0
BACK PAIN: 1
CONSTIPATION: 1
BLURRED VISION: 1
HEARTBURN: 1
SHORTNESS OF BREATH: 0
HEADACHES: 1

## 2022-09-30 ASSESSMENT — FIBROSIS 4 INDEX: FIB4 SCORE: 1.01

## 2022-09-30 NOTE — ASSESSMENT & PLAN NOTE
Chronic, stable.  Mass palpable beneath left mandible, is mobile, firm.  Patient reports it is unchanged.  Additionally she has an additional mass above clavicle, lateral to midline-she reports this is unchanged, she has had it biopsied previously and were found to be benign.

## 2022-09-30 NOTE — ASSESSMENT & PLAN NOTE
Chronic, intermittent. Has been told she has arthritis, and irregular curve of spine and needs surgery. Is improved with releasing bra, cbd ointment with massage.   Has tried PT, chiropractor,   Recommend topical NSAIDs such as Aspercreme, Voltaren.  Alternate ice/heat.  Massage, stretching, exercise to strengthen core and back muscles.

## 2022-09-30 NOTE — PROGRESS NOTES
"Subjective:     CC: Pt presents to establish with me. Prior PCP Florence Ruiz.  [place] interpretation utilized.  Patient is concerned about her mother's history of cancer and recent death, she is very concerned that she could have cancer.    HPI:   Hortencia presents today with    Problem   Chronic Bilateral Thoracic Back Pain   High Cholesterol   Mass of Left Side of Neck       Health Maintenance: Completed  Diet: variety of fresh veggies/fruits, lean meats, limited fast food/junk food/soda  Exercise: not currently  Substance Abuse: no  Safe in relationship. Yes,   Seat belts, Sun protection used.  Menses: last time menstrual period 6 years.     ROS:  Review of Systems   Constitutional:  Negative for chills and fever.   HENT:  Negative for hearing loss.         Lumps on neck   Eyes:  Positive for blurred vision (has not had eyes checked, will establish with optometry).   Respiratory:  Negative for cough and shortness of breath.    Cardiovascular:  Negative for chest pain and palpitations.   Gastrointestinal:  Positive for constipation (when not drinking enough water) and heartburn.        Food feels like it gets stuck in esophagus.   Musculoskeletal:  Positive for back pain (chronic back pain. has had mri, pt without diagnosis,).   Skin:  Negative for rash.   Neurological:  Positive for headaches (r/t vision and sitting at computer).   Psychiatric/Behavioral:  The patient is nervous/anxious.    All other systems reviewed and are negative.    Objective:     Exam:  /88 (BP Location: Right arm, Patient Position: Sitting, BP Cuff Size: Small adult)   Pulse 62   Temp 36.2 °C (97.1 °F) (Temporal)   Resp 18   Ht 1.727 m (5' 8\")   Wt 88.5 kg (195 lb 3.2 oz)   SpO2 97%   BMI 29.68 kg/m²  Body mass index is 29.68 kg/m².    Physical Exam  Vitals reviewed.   Constitutional:       Appearance: Normal appearance.   HENT:      Head: Normocephalic and atraumatic.      Right Ear: Tympanic membrane, ear canal " and external ear normal.      Left Ear: Tympanic membrane, ear canal and external ear normal.      Nose:      Comments: Mask in place     Mouth/Throat:      Comments: Mask in place  Eyes:      Extraocular Movements: Extraocular movements intact.      Conjunctiva/sclera: Conjunctivae normal.      Pupils: Pupils are equal, round, and reactive to light.   Neck:      Comments: Left submandibular hard lymph node, and and right supraclavicular  Cardiovascular:      Rate and Rhythm: Normal rate and regular rhythm.      Pulses: Normal pulses.      Heart sounds: Normal heart sounds.   Pulmonary:      Effort: Pulmonary effort is normal.      Breath sounds: Normal breath sounds.   Abdominal:      General: Bowel sounds are normal.   Musculoskeletal:      Cervical back: Full passive range of motion without pain.   Skin:     General: Skin is warm and dry.      Capillary Refill: Capillary refill takes less than 2 seconds.   Neurological:      General: No focal deficit present.      Mental Status: She is alert and oriented to person, place, and time.      Cranial Nerves: No cranial nerve deficit.      Sensory: No sensory deficit.      Motor: No weakness.   Psychiatric:         Attention and Perception: Attention normal.         Mood and Affect: Mood is anxious.         Speech: Speech normal.         Behavior: Behavior normal.       Labs: Reviewed from 2/13/2021    Assessment & Plan:     52 y.o. female with the following -     Problem List Items Addressed This Visit       Mass of left side of neck     Chronic, stable.  Mass palpable beneath left mandible, is mobile, firm.  Patient reports it is unchanged.  Additionally she has an additional mass above clavicle, lateral to midline-she reports this is unchanged, she has had it biopsied previously and were found to be benign.         High cholesterol     Chronic, stable.  Encouraged healthy nutrition, reviewed labs from 2/13/2021.  Will reevaluate with upcoming labs         Chronic  bilateral thoracic back pain     Chronic, intermittent. Has been told she has arthritis, and irregular curve of spine and needs surgery. Is improved with releasing bra, cbd ointment with massage.   Has tried PT, chiropractor,             Other Visit Diagnoses       Encounter for screening mammogram for breast cancer        Relevant Orders    MA-SCREENING MAMMO BILAT W/TOMOSYNTHESIS W/CAD    Wellness examination        Relevant Orders    HEMOGLOBIN A1C    VITAMIN D,25 HYDROXY (DEFICIENCY)    TSH    Comp Metabolic Panel    CBC WITHOUT DIFFERENTIAL    LIPID PANEL    Menopause        Relevant Orders    Referral to Gynecology    Vaginal dryness        Relevant Orders    Referral to Gynecology    Screening for cervical cancer        Relevant Orders    Referral to Gynecology    Genetic screening        Relevant Orders    Referral to Genetic Research Studies    Need for vaccination        Relevant Orders    Influenza Vaccine Quad Injection (PF) (Completed)          I have placed the below orders and discussed them with an approved delegating provider.  The MA is performing the below orders under the direction of Dr. Gallagher.    I spent a total of 55 minutes with record review, exam, communication with the patient, communication with other providers, and documentation of this encounter.    Return in about 6 months (around 3/30/2023), or if symptoms worsen or fail to improve, for Labs.    Please note that this dictation was created using voice recognition software. I have made every reasonable attempt to correct obvious errors, but I expect that there are errors of grammar and possibly content that I did not discover before finalizing the note.

## 2022-09-30 NOTE — ASSESSMENT & PLAN NOTE
Chronic, stable.  Encouraged healthy nutrition, reviewed labs from 2/13/2021.  Will reevaluate with upcoming labs

## 2022-10-14 ENCOUNTER — RESEARCH ENCOUNTER (OUTPATIENT)
Dept: MEDICAL GROUP | Facility: PHYSICIAN GROUP | Age: 52
End: 2022-10-14
Payer: COMMERCIAL

## 2022-10-14 DIAGNOSIS — Z00.6 RESEARCH STUDY PATIENT: ICD-10-CM

## 2022-10-19 ENCOUNTER — HOSPITAL ENCOUNTER (OUTPATIENT)
Dept: LAB | Facility: MEDICAL CENTER | Age: 52
End: 2022-10-19
Payer: COMMERCIAL

## 2022-10-19 DIAGNOSIS — Z00.00 WELLNESS EXAMINATION: ICD-10-CM

## 2022-10-19 LAB
25(OH)D3 SERPL-MCNC: 44 NG/ML (ref 30–100)
ALBUMIN SERPL BCP-MCNC: 4.1 G/DL (ref 3.2–4.9)
ALBUMIN/GLOB SERPL: 1.2 G/DL
ALP SERPL-CCNC: 83 U/L (ref 30–99)
ALT SERPL-CCNC: 18 U/L (ref 2–50)
ANION GAP SERPL CALC-SCNC: 9 MMOL/L (ref 7–16)
AST SERPL-CCNC: 17 U/L (ref 12–45)
BILIRUB SERPL-MCNC: 0.5 MG/DL (ref 0.1–1.5)
BUN SERPL-MCNC: 14 MG/DL (ref 8–22)
CALCIUM SERPL-MCNC: 9.3 MG/DL (ref 8.5–10.5)
CHLORIDE SERPL-SCNC: 106 MMOL/L (ref 96–112)
CHOLEST SERPL-MCNC: 255 MG/DL (ref 100–199)
CO2 SERPL-SCNC: 27 MMOL/L (ref 20–33)
CREAT SERPL-MCNC: 0.65 MG/DL (ref 0.5–1.4)
ERYTHROCYTE [DISTWIDTH] IN BLOOD BY AUTOMATED COUNT: 41.5 FL (ref 35.9–50)
EST. AVERAGE GLUCOSE BLD GHB EST-MCNC: 114 MG/DL
FASTING STATUS PATIENT QL REPORTED: NORMAL
GFR SERPLBLD CREATININE-BSD FMLA CKD-EPI: 104 ML/MIN/1.73 M 2
GLOBULIN SER CALC-MCNC: 3.5 G/DL (ref 1.9–3.5)
GLUCOSE SERPL-MCNC: 89 MG/DL (ref 65–99)
HBA1C MFR BLD: 5.6 % (ref 4–5.6)
HCT VFR BLD AUTO: 42.1 % (ref 37–47)
HDLC SERPL-MCNC: 64 MG/DL
HGB BLD-MCNC: 13.6 G/DL (ref 12–16)
LDLC SERPL CALC-MCNC: 179 MG/DL
MCH RBC QN AUTO: 29.7 PG (ref 27–33)
MCHC RBC AUTO-ENTMCNC: 32.3 G/DL (ref 33.6–35)
MCV RBC AUTO: 91.9 FL (ref 81.4–97.8)
PLATELET # BLD AUTO: 248 K/UL (ref 164–446)
PMV BLD AUTO: 11.4 FL (ref 9–12.9)
POTASSIUM SERPL-SCNC: 4.3 MMOL/L (ref 3.6–5.5)
PROT SERPL-MCNC: 7.6 G/DL (ref 6–8.2)
RBC # BLD AUTO: 4.58 M/UL (ref 4.2–5.4)
SODIUM SERPL-SCNC: 142 MMOL/L (ref 135–145)
TRIGL SERPL-MCNC: 62 MG/DL (ref 0–149)
TSH SERPL DL<=0.005 MIU/L-ACNC: 0.84 UIU/ML (ref 0.38–5.33)
WBC # BLD AUTO: 6.7 K/UL (ref 4.8–10.8)

## 2022-10-19 PROCEDURE — 82306 VITAMIN D 25 HYDROXY: CPT

## 2022-10-19 PROCEDURE — 80053 COMPREHEN METABOLIC PANEL: CPT

## 2022-10-19 PROCEDURE — 36415 COLL VENOUS BLD VENIPUNCTURE: CPT

## 2022-10-19 PROCEDURE — 84443 ASSAY THYROID STIM HORMONE: CPT

## 2022-10-19 PROCEDURE — 85027 COMPLETE CBC AUTOMATED: CPT

## 2022-10-19 PROCEDURE — 83036 HEMOGLOBIN GLYCOSYLATED A1C: CPT

## 2022-10-19 PROCEDURE — 80061 LIPID PANEL: CPT

## 2022-11-14 LAB
APOB+LDLR+PCSK9 GENE MUT ANL BLD/T: NOT DETECTED
BRCA1+BRCA2 DEL+DUP + FULL MUT ANL BLD/T: NOT DETECTED
MLH1+MSH2+MSH6+PMS2 GN DEL+DUP+FUL M: NOT DETECTED

## 2023-01-10 ENCOUNTER — HOSPITAL ENCOUNTER (OUTPATIENT)
Dept: RADIOLOGY | Facility: MEDICAL CENTER | Age: 53
End: 2023-01-10
Attending: NURSE PRACTITIONER
Payer: COMMERCIAL

## 2023-01-10 DIAGNOSIS — M54.50 LUMBAR PAIN: ICD-10-CM

## 2023-01-10 DIAGNOSIS — M54.6 BILATERAL THORACIC BACK PAIN, UNSPECIFIED CHRONICITY: ICD-10-CM

## 2023-01-10 PROCEDURE — 72131 CT LUMBAR SPINE W/O DYE: CPT

## 2023-01-10 PROCEDURE — 72128 CT CHEST SPINE W/O DYE: CPT

## 2023-01-16 ENCOUNTER — APPOINTMENT (OUTPATIENT)
Dept: MEDICAL GROUP | Facility: PHYSICIAN GROUP | Age: 53
End: 2023-01-16
Payer: COMMERCIAL

## 2023-01-17 ENCOUNTER — OFFICE VISIT (OUTPATIENT)
Dept: MEDICAL GROUP | Facility: PHYSICIAN GROUP | Age: 53
End: 2023-01-17
Payer: COMMERCIAL

## 2023-01-17 VITALS
TEMPERATURE: 98 F | WEIGHT: 199 LBS | OXYGEN SATURATION: 98 % | RESPIRATION RATE: 18 BRPM | SYSTOLIC BLOOD PRESSURE: 124 MMHG | BODY MASS INDEX: 30.16 KG/M2 | DIASTOLIC BLOOD PRESSURE: 82 MMHG | HEART RATE: 70 BPM | HEIGHT: 68 IN

## 2023-01-17 DIAGNOSIS — M54.6 ACUTE LEFT-SIDED THORACIC BACK PAIN: ICD-10-CM

## 2023-01-17 PROBLEM — M54.9 ACUTE LEFT-SIDED BACK PAIN: Status: ACTIVE | Noted: 2023-01-17

## 2023-01-17 PROCEDURE — 99213 OFFICE O/P EST LOW 20 MIN: CPT

## 2023-01-17 ASSESSMENT — ENCOUNTER SYMPTOMS: BACK PAIN: 1

## 2023-01-17 ASSESSMENT — FIBROSIS 4 INDEX: FIB4 SCORE: 0.87

## 2023-01-17 NOTE — LETTER
[facility]  [address]  [place] [address]     January 17, 2023    Patient: Hortencia Bonilla   YOB: 1970   Date of Visit: 1/17/2023       To Whom It May Concern:    Hortencia Bonilla was seen and treated in our department on 1/17/2023. She has recently incurred an injury for which she is being evaluated/treated by [facility]. She has been instructed to be off for 4 weeks according to their recent notes. Please feel free to contact me if you have any further questions.     Sincerely,         JODI Palacios.

## 2023-01-17 NOTE — PROGRESS NOTES
" Subjective:     CC: Pt presents today requesting documentation to provide to employer for missing work r/t back injury. Visit was completed using [place] interpretation using I-pad.    HPI:   Hortencia presents today with    Problem   Acute Left-Sided Back Pain     ROS:  Review of Systems   Musculoskeletal:  Positive for back pain.   All other systems reviewed and are negative.    Objective:     Exam:  /82 (BP Location: Right arm, Patient Position: Sitting, BP Cuff Size: Small adult)   Pulse 70   Temp 36.7 °C (98 °F) (Temporal)   Resp 18   Ht 1.727 m (5' 8\")   Wt 90.3 kg (199 lb)   SpO2 98%   BMI 30.26 kg/m²  Body mass index is 30.26 kg/m².    Physical Exam  Constitutional:       General: She is in acute distress.   HENT:      Head: Normocephalic and atraumatic.   Cardiovascular:      Rate and Rhythm: Normal rate.      Pulses: Normal pulses.   Pulmonary:      Effort: Pulmonary effort is normal. No respiratory distress.   Musculoskeletal:         General: Tenderness (pain to lumbar and low thoracic spine upon paplpation) present.   Skin:     General: Skin is warm and dry.      Capillary Refill: Capillary refill takes less than 2 seconds.   Neurological:      General: No focal deficit present.      Mental Status: She is alert and oriented to person, place, and time.      Cranial Nerves: No cranial nerve deficit.      Sensory: No sensory deficit.      Motor: No weakness.     Assessment & Plan:     52 y.o. female with the following -     Problem List Items Addressed This Visit       Acute left-sided back pain     Acute, unstable. Recent fall on ice 1/9/23, for which she has had severe pain, was seen by [facility] urgent care, CT indicates fracture of T 12, has MRI scheduled and follow up with Dr. Soriano in 2 weeks.   Pt presents today hoping for a letter to provide for her employer.     Reports she is very anxious about her MRI, and has panic/chlostrophobia, wants this to be with anesthesia, explained this is up to " ordering provider/. Diana's scheduling, but I did send a message to ordering provider Citlali Lozano PA-C requesting assistance/clarification of this.  Offered oral valium, but patient did not feel this would be sufficient.          I have placed the below orders and discussed them with an approved delegating provider.  The MA is performing the below orders under the direction of Dr. Treviño.    I spent a total of 31 minutes with record review, exam, communication with the patient, communication with other providers, and documentation of this encounter.    Return with any questions or concerns.    Please note that this dictation was created using voice recognition software. I have made every reasonable attempt to correct obvious errors, but I expect that there are errors of grammar and possibly content that I did not discover before finalizing the note.

## 2023-01-17 NOTE — ASSESSMENT & PLAN NOTE
Acute, unstable. Recent fall on ice 1/9/23, for which she has had severe pain, was seen by [facility] urgent care, CT indicates fracture of T 12, has MRI scheduled and follow up with Dr. Soriano in 2 weeks.   Pt presents today hoping for a letter to provide for her employer.     Reports she is very anxious about her MRI, and has panic/chlostrophobia, wants this to be with anesthesia, explained this is up to ordering provider/st. Diana's scheduling, but I did send a message to ordering provider Citlali Lozano PA-C requesting assistance/clarification of this.  Offered oral valium, but patient did not feel this would be sufficient.

## 2023-01-24 NOTE — TELEPHONE ENCOUNTER
Future Appointments       Provider Department Center    12/12/2018 11:10 AM Florence Ruiz D.O. [facility]        ESTABLISHED PATIENT PRE-VISIT PLANNING     Patient was contacted to complete PVP.     Note: Patient will not be contacted if there is no indication to call.     1.  Reviewed notes from the last few office visits within the medical group: Yes    2.  If any orders were placed at last visit or intended to be done for this visit (i.e. 6 mos follow-up), do we have Results/Consult Notes?        •  Labs - Labs ordered, completed on 06/20/18 and results are in chart.       •  Imaging - Imaging ordered, NOT completed. Patient advised to complete prior to next appointment.       •  Referrals - Referral ordered, patient has NOT been seen.    3. Is this appointment scheduled as a Hospital Follow-Up? No    4.  Immunizations were updated in [employer] using WebIZ?: Yes       •  Web Iz Recommendations: FLU, MMR , TD and ZOSTAVAX (Shingles)    5.  Patient is due for the following Health Maintenance Topics:   Health Maintenance Due   Topic Date Due   • MAMMOGRAM  05/04/2016   • PAP SMEAR  01/14/2018   • COLONOSCOPY  08/03/2018   • IMM ZOSTER VACCINES (1 of 2) 08/03/2018   • IMM INFLUENZA (1) 09/01/2018         6.  MDX printed for Provider? NO    7.  Patient was informed to arrive 15 min prior to their scheduled appointment and bring in their medication bottles.   Fair

## 2023-01-31 ENCOUNTER — OFFICE VISIT (OUTPATIENT)
Dept: MEDICAL GROUP | Facility: PHYSICIAN GROUP | Age: 53
End: 2023-01-31
Payer: COMMERCIAL

## 2023-01-31 VITALS
HEART RATE: 70 BPM | RESPIRATION RATE: 18 BRPM | HEIGHT: 68 IN | TEMPERATURE: 97.6 F | OXYGEN SATURATION: 98 % | BODY MASS INDEX: 30.31 KG/M2 | SYSTOLIC BLOOD PRESSURE: 122 MMHG | DIASTOLIC BLOOD PRESSURE: 84 MMHG | WEIGHT: 200 LBS

## 2023-01-31 DIAGNOSIS — M89.9 BONE DISEASE: ICD-10-CM

## 2023-01-31 DIAGNOSIS — Z02.89 ENCOUNTER FOR COMPLETION OF FORM WITH PATIENT: ICD-10-CM

## 2023-01-31 PROCEDURE — 99214 OFFICE O/P EST MOD 30 MIN: CPT

## 2023-01-31 ASSESSMENT — FIBROSIS 4 INDEX: FIB4 SCORE: 0.87

## 2023-01-31 ASSESSMENT — ENCOUNTER SYMPTOMS
CONSTIPATION: 1
BACK PAIN: 1

## 2023-01-31 NOTE — PROGRESS NOTES
"Subjective:     CC: Pt presents today for FMLA and disability paperwork to be filled out. Pt fell 01/19/2023 on ice, was seen at [facility] for severe back pain, with CT suggesting fracture of T12, Recent MRI and follow up visit with Dr. Soriano at [facility] suggesting concerning symptoms for lucency, with additional MRI, CT of chest, abdomen, pelvis, and T-12 biopsy ordered.     HPI:   Hortencia presents today with    Problem   Bone Disease     ROS:  Review of Systems   Gastrointestinal:  Positive for constipation.   Musculoskeletal:  Positive for back pain.   All other systems reviewed and are negative.    Objective:     Exam:  /84 (BP Location: Left arm, Patient Position: Sitting, BP Cuff Size: Small adult)   Pulse 70   Temp 36.4 °C (97.6 °F) (Temporal)   Resp 18   Ht 1.727 m (5' 8\")   Wt 90.7 kg (200 lb)   SpO2 98%   BMI 30.41 kg/m²  Body mass index is 30.41 kg/m².    Physical Exam  Vitals reviewed.   Constitutional:       General: She is in acute distress.   HENT:      Head: Normocephalic and atraumatic.   Cardiovascular:      Rate and Rhythm: Normal rate.      Pulses: Normal pulses.   Pulmonary:      Effort: Pulmonary effort is normal. No respiratory distress.   Musculoskeletal:         General: Tenderness present.   Skin:     General: Skin is warm and dry.      Capillary Refill: Capillary refill takes less than 2 seconds.   Neurological:      Mental Status: She is alert and oriented to person, place, and time.      Sensory: No sensory deficit.   Psychiatric:         Mood and Affect: Mood normal.         Behavior: Behavior normal.     Assessment & Plan:     52 y.o. female with the following -     Problem List Items Addressed This Visit       Bone disease     Acute, unstable. Recent MRI and follow up appointment with Dr. Soriano at [facility] suggests concerning findings for bone lesion to T-12, has follow up biopsy, CT of chest abdomen pelvis, and MRI of lumbar spine ordered for further evaluation of this " condition.          Relevant Orders    Referral to Intake Oncology Coordinator     Other Visit Diagnoses       Encounter for completion of form with patient              I have placed the below orders and discussed them with an approved delegating provider.  The MA is performing the below orders under the direction of Dr. Treviño.    I spent a total of 41 minutes with record review, exam, communication with the patient, communication with other providers, and documentation of this encounter.    Return if symptoms worsen or fail to improve.    Please note that this dictation was created using voice recognition software. I have made every reasonable attempt to correct obvious errors, but I expect that there are errors of grammar and possibly content that I did not discover before finalizing the note.

## 2023-01-31 NOTE — ASSESSMENT & PLAN NOTE
Acute, unstable. Recent MRI and follow up appointment with Dr. Soriano at [facility] suggests concerning findings for bone lesion to T-12, has follow up biopsy, CT of chest abdomen pelvis, and MRI of lumbar spine ordered for further evaluation of this condition.   Continue tramadol 50 mg as needed for pain. Discussed management of constipation.  Abdominal binder for support

## 2023-03-02 ENCOUNTER — HOSPITAL ENCOUNTER (OUTPATIENT)
Dept: RADIOLOGY | Facility: MEDICAL CENTER | Age: 53
End: 2023-03-02

## 2023-03-02 ENCOUNTER — HOSPITAL ENCOUNTER (OUTPATIENT)
Dept: LAB | Facility: MEDICAL CENTER | Age: 53
End: 2023-03-02
Attending: NURSE PRACTITIONER
Payer: COMMERCIAL

## 2023-03-02 ENCOUNTER — HOSPITAL ENCOUNTER (OUTPATIENT)
Dept: HEMATOLOGY ONCOLOGY | Facility: MEDICAL CENTER | Age: 53
End: 2023-03-02
Attending: NURSE PRACTITIONER
Payer: COMMERCIAL

## 2023-03-02 VITALS
HEART RATE: 73 BPM | DIASTOLIC BLOOD PRESSURE: 95 MMHG | OXYGEN SATURATION: 97 % | SYSTOLIC BLOOD PRESSURE: 123 MMHG | WEIGHT: 205.8 LBS | HEIGHT: 68 IN | TEMPERATURE: 97.9 F | BODY MASS INDEX: 31.19 KG/M2 | RESPIRATION RATE: 18 BRPM

## 2023-03-02 DIAGNOSIS — S22.080A COMPRESSION FRACTURE OF T12 VERTEBRA, INITIAL ENCOUNTER (HCC): ICD-10-CM

## 2023-03-02 DIAGNOSIS — R93.7 ABNORMAL MRI, THORACIC SPINE: ICD-10-CM

## 2023-03-02 DIAGNOSIS — R59.0 CERVICAL ADENOPATHY: ICD-10-CM

## 2023-03-02 LAB
ALBUMIN SERPL BCP-MCNC: 4.4 G/DL (ref 3.2–4.9)
ALBUMIN/GLOB SERPL: 1.1 G/DL
ALP SERPL-CCNC: 101 U/L (ref 30–99)
ALT SERPL-CCNC: 37 U/L (ref 2–50)
ANION GAP SERPL CALC-SCNC: 10 MMOL/L (ref 7–16)
AST SERPL-CCNC: 24 U/L (ref 12–45)
BASOPHILS # BLD AUTO: 0.4 % (ref 0–1.8)
BASOPHILS # BLD: 0.03 K/UL (ref 0–0.12)
BILIRUB SERPL-MCNC: 0.4 MG/DL (ref 0.1–1.5)
BUN SERPL-MCNC: 15 MG/DL (ref 8–22)
CALCIUM ALBUM COR SERPL-MCNC: 9.2 MG/DL (ref 8.5–10.5)
CALCIUM SERPL-MCNC: 9.5 MG/DL (ref 8.5–10.5)
CHLORIDE SERPL-SCNC: 102 MMOL/L (ref 96–112)
CO2 SERPL-SCNC: 27 MMOL/L (ref 20–33)
CREAT SERPL-MCNC: 0.59 MG/DL (ref 0.5–1.4)
EOSINOPHIL # BLD AUTO: 0.11 K/UL (ref 0–0.51)
EOSINOPHIL NFR BLD: 1.4 % (ref 0–6.9)
ERYTHROCYTE [DISTWIDTH] IN BLOOD BY AUTOMATED COUNT: 41.5 FL (ref 35.9–50)
GFR SERPLBLD CREATININE-BSD FMLA CKD-EPI: 107 ML/MIN/1.73 M 2
GLOBULIN SER CALC-MCNC: 3.9 G/DL (ref 1.9–3.5)
GLUCOSE SERPL-MCNC: 95 MG/DL (ref 65–99)
HCT VFR BLD AUTO: 45.2 % (ref 37–47)
HGB BLD-MCNC: 14.9 G/DL (ref 12–16)
IMM GRANULOCYTES # BLD AUTO: 0.03 K/UL (ref 0–0.11)
IMM GRANULOCYTES NFR BLD AUTO: 0.4 % (ref 0–0.9)
LYMPHOCYTES # BLD AUTO: 2.61 K/UL (ref 1–4.8)
LYMPHOCYTES NFR BLD: 34.3 % (ref 22–41)
MCH RBC QN AUTO: 29.8 PG (ref 27–33)
MCHC RBC AUTO-ENTMCNC: 33 G/DL (ref 33.6–35)
MCV RBC AUTO: 90.4 FL (ref 81.4–97.8)
MONOCYTES # BLD AUTO: 0.49 K/UL (ref 0–0.85)
MONOCYTES NFR BLD AUTO: 6.4 % (ref 0–13.4)
NEUTROPHILS # BLD AUTO: 4.34 K/UL (ref 2–7.15)
NEUTROPHILS NFR BLD: 57.1 % (ref 44–72)
NRBC # BLD AUTO: 0 K/UL
NRBC BLD-RTO: 0 /100 WBC
PLATELET # BLD AUTO: 289 K/UL (ref 164–446)
PMV BLD AUTO: 11.4 FL (ref 9–12.9)
POTASSIUM SERPL-SCNC: 4.3 MMOL/L (ref 3.6–5.5)
PROT SERPL-MCNC: 8.3 G/DL (ref 6–8.2)
RBC # BLD AUTO: 5 M/UL (ref 4.2–5.4)
SODIUM SERPL-SCNC: 139 MMOL/L (ref 135–145)
WBC # BLD AUTO: 7.6 K/UL (ref 4.8–10.8)

## 2023-03-02 PROCEDURE — 99212 OFFICE O/P EST SF 10 MIN: CPT | Performed by: NURSE PRACTITIONER

## 2023-03-02 PROCEDURE — 80053 COMPREHEN METABOLIC PANEL: CPT

## 2023-03-02 PROCEDURE — 99215 OFFICE O/P EST HI 40 MIN: CPT | Performed by: NURSE PRACTITIONER

## 2023-03-02 PROCEDURE — 82784 ASSAY IGA/IGD/IGG/IGM EACH: CPT

## 2023-03-02 PROCEDURE — 85025 COMPLETE CBC W/AUTO DIFF WBC: CPT

## 2023-03-02 PROCEDURE — 84166 PROTEIN E-PHORESIS/URINE/CSF: CPT

## 2023-03-02 PROCEDURE — 86335 IMMUNFIX E-PHORSIS/URINE/CSF: CPT

## 2023-03-02 PROCEDURE — 83521 IG LIGHT CHAINS FREE EACH: CPT

## 2023-03-02 PROCEDURE — 36415 COLL VENOUS BLD VENIPUNCTURE: CPT

## 2023-03-02 PROCEDURE — 86334 IMMUNOFIX E-PHORESIS SERUM: CPT

## 2023-03-02 PROCEDURE — 84155 ASSAY OF PROTEIN SERUM: CPT

## 2023-03-02 PROCEDURE — 84156 ASSAY OF PROTEIN URINE: CPT

## 2023-03-02 PROCEDURE — 84165 PROTEIN E-PHORESIS SERUM: CPT

## 2023-03-02 ASSESSMENT — ENCOUNTER SYMPTOMS
DIZZINESS: 0
SHORTNESS OF BREATH: 0
WEIGHT LOSS: 0
CONSTIPATION: 1
COUGH: 0
DIARRHEA: 0
FEVER: 0
DEPRESSION: 0
NAUSEA: 0
SORE THROAT: 0
DIAPHORESIS: 0
CHILLS: 0
PALPITATIONS: 0
HEADACHES: 0
NERVOUS/ANXIOUS: 0
BACK PAIN: 1
VOMITING: 0
TINGLING: 0
INSOMNIA: 1

## 2023-03-02 ASSESSMENT — FIBROSIS 4 INDEX: FIB4 SCORE: 0.87

## 2023-03-02 ASSESSMENT — PAIN SCALES - GENERAL: PAINLEVEL: NO PAIN
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
## 2023-03-02 NOTE — ADDENDUM NOTE
Encounter addended by: Gypsy Naik, Med Ass't on: 3/2/2023 9:31 AM   Actions taken: Charge Capture section accepted

## 2023-03-05 LAB
ALBUMIN SERPL ELPH-MCNC: 4.31 G/DL (ref 3.75–5.01)
ALPHA1 GLOB SERPL ELPH-MCNC: 0.31 G/DL (ref 0.19–0.46)
ALPHA2 GLOB SERPL ELPH-MCNC: 0.88 G/DL (ref 0.48–1.05)
B-GLOBULIN SERPL ELPH-MCNC: 0.88 G/DL (ref 0.48–1.1)
EER MONOCLONAL PROTEIN AND FLC, SERUM Q5224: ABNORMAL
GAMMA GLOB SERPL ELPH-MCNC: 1.52 G/DL (ref 0.62–1.51)
IGA SERPL-MCNC: 175 MG/DL (ref 68–408)
IGG SERPL-MCNC: 1664 MG/DL (ref 768–1632)
IGM SERPL-MCNC: 155 MG/DL (ref 35–263)
INTERPRETATION SERPL IFE-IMP: ABNORMAL
INTERPRETATION SERPL IFE-IMP: ABNORMAL
KAPPA LC FREE SER-MCNC: 21.96 MG/L (ref 3.3–19.4)
KAPPA LC FREE/LAMBDA FREE SER NEPH: 1.14 {RATIO} (ref 0.26–1.65)
LAMBDA LC FREE SERPL-MCNC: 19.31 MG/L (ref 5.71–26.3)
MONOCLONAL PROTEIN NL11656: ABNORMAL G/DL
PROT SERPL-MCNC: 7.9 G/DL (ref 6.3–8.2)

## 2023-03-10 LAB
ALBUMIN 24H MFR UR ELPH: 48.8 %
ALPHA1 GLOB 24H MFR UR ELPH: 11.7 %
ALPHA2 GLOB 24H MFR UR ELPH: 18.1 %
B-GLOBULIN 24H MFR UR ELPH: 16.3 %
COLLECT DURATION TIME SPEC: NORMAL HRS
EER MONOCLONAL PROTEIN STUDY, 24 HOUR U Q5964: NORMAL
GAMMA GLOB 24H MFR UR ELPH: 5.1 %
INTERPRETATION UR IFE-IMP: NORMAL
M PROTEIN 24H MFR UR ELPH: 0 %
M PROTEIN 24H UR ELPH-MRATE: NORMAL MG/24 HRS
PROT 24H UR-MRATE: NORMAL MG/D (ref 40–150)
PROT UR-MCNC: 6 MG/DL
SPECIMEN VOL ?TM UR: NORMAL ML

## 2023-03-16 ENCOUNTER — OFFICE VISIT (OUTPATIENT)
Dept: MEDICAL GROUP | Facility: PHYSICIAN GROUP | Age: 53
End: 2023-03-16
Payer: COMMERCIAL

## 2023-03-16 VITALS
SYSTOLIC BLOOD PRESSURE: 122 MMHG | WEIGHT: 207 LBS | DIASTOLIC BLOOD PRESSURE: 74 MMHG | HEART RATE: 74 BPM | HEIGHT: 68 IN | OXYGEN SATURATION: 98 % | BODY MASS INDEX: 31.37 KG/M2 | TEMPERATURE: 97.5 F

## 2023-03-16 DIAGNOSIS — M80.00XD AGE-RELATED OSTEOPOROSIS WITH CURRENT PATHOLOGICAL FRACTURE WITH ROUTINE HEALING, SUBSEQUENT ENCOUNTER: ICD-10-CM

## 2023-03-16 DIAGNOSIS — M89.9 BONE DISEASE: ICD-10-CM

## 2023-03-16 DIAGNOSIS — E04.1 THYROID NODULE: ICD-10-CM

## 2023-03-16 PROBLEM — M80.00XA AGE-RELATED OSTEOPOROSIS WITH CURRENT PATHOLOGICAL FRACTURE: Status: ACTIVE | Noted: 2023-03-16

## 2023-03-16 PROCEDURE — 99213 OFFICE O/P EST LOW 20 MIN: CPT

## 2023-03-16 ASSESSMENT — ENCOUNTER SYMPTOMS
BACK PAIN: 1
MYALGIAS: 1

## 2023-03-16 ASSESSMENT — FIBROSIS 4 INDEX: FIB4 SCORE: 0.74
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
## 2023-03-16 NOTE — ASSESSMENT & PLAN NOTE
Chronic, noted on recent CT scan 3/14/23  There is a 10 mm nodule in the left thyroid image #100. There is a larger nodule at the junction of the right thyroid gland and thyroid isthmus at the level of the thoracic inlet. This measures up to 41 x 22 mm increasing from 32 x 21 mm previously. There are small bilateral jugular digastric lymph nodes, felt to be normal.  Us thyroid for further evaluataion
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
## 2023-03-21 ENCOUNTER — HOSPITAL ENCOUNTER (OUTPATIENT)
Dept: RADIOLOGY | Facility: MEDICAL CENTER | Age: 53
End: 2023-03-21
Attending: NURSE PRACTITIONER
Payer: COMMERCIAL

## 2023-03-21 ENCOUNTER — HOSPITAL ENCOUNTER (OUTPATIENT)
Dept: HEMATOLOGY ONCOLOGY | Facility: MEDICAL CENTER | Age: 53
End: 2023-03-21
Attending: NURSE PRACTITIONER
Payer: COMMERCIAL

## 2023-03-21 VITALS
RESPIRATION RATE: 17 BRPM | OXYGEN SATURATION: 98 % | TEMPERATURE: 97.4 F | HEART RATE: 72 BPM | SYSTOLIC BLOOD PRESSURE: 117 MMHG | HEIGHT: 68 IN | BODY MASS INDEX: 31.74 KG/M2 | DIASTOLIC BLOOD PRESSURE: 74 MMHG | WEIGHT: 209.44 LBS

## 2023-03-21 DIAGNOSIS — R93.7 ABNORMAL MRI, THORACIC SPINE: ICD-10-CM

## 2023-03-21 DIAGNOSIS — E04.1 THYROID NODULE: ICD-10-CM

## 2023-03-21 PROCEDURE — 76536 US EXAM OF HEAD AND NECK: CPT

## 2023-03-21 PROCEDURE — 99215 OFFICE O/P EST HI 40 MIN: CPT | Performed by: NURSE PRACTITIONER

## 2023-03-21 PROCEDURE — 99212 OFFICE O/P EST SF 10 MIN: CPT | Performed by: NURSE PRACTITIONER

## 2023-03-21 ASSESSMENT — PAIN SCALES - GENERAL: PAINLEVEL: 5=MODERATE PAIN

## 2023-03-21 ASSESSMENT — ENCOUNTER SYMPTOMS
BACK PAIN: 1
NERVOUS/ANXIOUS: 1
SORE THROAT: 0

## 2023-03-21 ASSESSMENT — FIBROSIS 4 INDEX: FIB4 SCORE: 0.74
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
## 2023-03-22 ENCOUNTER — TELEPHONE (OUTPATIENT)
Dept: HEMATOLOGY ONCOLOGY | Facility: MEDICAL CENTER | Age: 53
End: 2023-03-22
Payer: COMMERCIAL

## 2023-03-22 NOTE — TELEPHONE ENCOUNTER
I have attempted to contact patient 3 times today with the use of our .  Unfortunately the patient has a mailbox that has not been set up and I am unable to leave a voice message.    At visit yesterday I did discuss with patient recommendation to proceed with an FNA biopsy of the thyroid nodule.  Thyroid ultrasound did show a moderately suspicious nodule and I have placed that order for a biopsy of the thyroid.    At visit yesterday I did discuss with the patient proceeding with kyphoplasty as well as possible biopsy.  We did discuss bone marrow however I did speak to interventional radiology and they will proceed with bone biopsy at the same time of kyphoplasty.  However will need to get authorization from insurance for procedure.  However IR is willing to see the patient in consultation which I have placed referral as patient is very much interested in proceeding with further work-up.    We will continue to attempt to contact patient to give her the information discussed above.

## 2023-03-23 ENCOUNTER — HOSPITAL ENCOUNTER (OUTPATIENT)
Dept: RADIOLOGY | Facility: MEDICAL CENTER | Age: 53
End: 2023-03-23
Payer: COMMERCIAL

## 2023-03-23 ENCOUNTER — HOSPITAL ENCOUNTER (OUTPATIENT)
Dept: RADIOLOGY | Facility: MEDICAL CENTER | Age: 53
End: 2023-03-23
Attending: STUDENT IN AN ORGANIZED HEALTH CARE EDUCATION/TRAINING PROGRAM
Payer: COMMERCIAL

## 2023-03-23 ENCOUNTER — HOSPITAL ENCOUNTER (OUTPATIENT)
Dept: RADIOLOGY | Facility: MEDICAL CENTER | Age: 53
End: 2023-03-23
Attending: NURSE PRACTITIONER
Payer: COMMERCIAL

## 2023-03-23 ENCOUNTER — TELEPHONE (OUTPATIENT)
Dept: HEMATOLOGY ONCOLOGY | Facility: MEDICAL CENTER | Age: 53
End: 2023-03-23
Payer: COMMERCIAL

## 2023-03-23 NOTE — TELEPHONE ENCOUNTER
Spoke to patient with the assistance of .   Thyroid nodule biopsy is pending.   Patient is being seen by IR tomorrow to discuss kyphoplasty and bone biospy.     Patient informed of the plan and in agreement.
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
## 2023-03-24 ENCOUNTER — APPOINTMENT (OUTPATIENT)
Dept: ADMISSIONS | Facility: MEDICAL CENTER | Age: 53
End: 2023-03-24
Attending: NURSE PRACTITIONER
Payer: COMMERCIAL

## 2023-03-24 ENCOUNTER — HOSPITAL ENCOUNTER (OUTPATIENT)
Dept: RADIOLOGY | Facility: MEDICAL CENTER | Age: 53
End: 2023-03-24
Attending: NURSE PRACTITIONER
Payer: COMMERCIAL

## 2023-03-24 DIAGNOSIS — R93.7 ABNORMAL MRI, THORACIC SPINE: ICD-10-CM

## 2023-03-24 ASSESSMENT — ENCOUNTER SYMPTOMS
FEVER: 0
WEAKNESS: 0
CHILLS: 0
GASTROINTESTINAL NEGATIVE: 1
SPEECH CHANGE: 0
SENSORY CHANGE: 0
DIAPHORESIS: 0
CARDIOVASCULAR NEGATIVE: 1
CONSTITUTIONAL NEGATIVE: 1
FOCAL WEAKNESS: 0
RESPIRATORY NEGATIVE: 1
WEIGHT LOSS: 0

## 2023-03-24 ASSESSMENT — LIFESTYLE VARIABLES: SUBSTANCE_ABUSE: 0

## 2023-03-28 ENCOUNTER — PRE-ADMISSION TESTING (OUTPATIENT)
Dept: ADMISSIONS | Facility: MEDICAL CENTER | Age: 53
End: 2023-03-28
Attending: RADIOLOGY
Payer: COMMERCIAL

## 2023-03-28 NOTE — OR NURSING
Patient states unable to complete labs prior to procedure, will plan to complete day of procedure.

## 2023-03-30 ENCOUNTER — OFFICE VISIT (OUTPATIENT)
Dept: MEDICAL GROUP | Facility: PHYSICIAN GROUP | Age: 53
End: 2023-03-30
Payer: COMMERCIAL

## 2023-03-30 VITALS
BODY MASS INDEX: 31.52 KG/M2 | TEMPERATURE: 96.8 F | HEART RATE: 84 BPM | DIASTOLIC BLOOD PRESSURE: 70 MMHG | RESPIRATION RATE: 16 BRPM | SYSTOLIC BLOOD PRESSURE: 114 MMHG | OXYGEN SATURATION: 96 % | HEIGHT: 68 IN | WEIGHT: 208 LBS

## 2023-03-30 DIAGNOSIS — M80.00XD AGE-RELATED OSTEOPOROSIS WITH CURRENT PATHOLOGICAL FRACTURE WITH ROUTINE HEALING, SUBSEQUENT ENCOUNTER: ICD-10-CM

## 2023-03-30 DIAGNOSIS — E04.1 THYROID NODULE: ICD-10-CM

## 2023-03-30 DIAGNOSIS — Z12.31 ENCOUNTER FOR SCREENING MAMMOGRAM FOR BREAST CANCER: ICD-10-CM

## 2023-03-30 DIAGNOSIS — E66.9 OBESITY (BMI 30-39.9): ICD-10-CM

## 2023-03-30 DIAGNOSIS — E78.00 HIGH CHOLESTEROL: ICD-10-CM

## 2023-03-30 PROCEDURE — 99214 OFFICE O/P EST MOD 30 MIN: CPT

## 2023-03-30 ASSESSMENT — ENCOUNTER SYMPTOMS: BACK PAIN: 1

## 2023-03-30 ASSESSMENT — FIBROSIS 4 INDEX: FIB4 SCORE: 0.74

## 2023-03-30 NOTE — ASSESSMENT & PLAN NOTE
Chronic, unstable.  Additional nodule noted to thyroid from 2021.  3 nodules, total.  2 appear to have grown in size from previous ultrasound.  Cervical lymph node incidental finding on most recent ultrasound  E consult to endocrinology  Consider FNA

## 2023-03-30 NOTE — ASSESSMENT & PLAN NOTE
Chronic, unstable. Has upcoming biopsy and cement to stabilize fracture scheduled 4/6/23, Luana Smalls is managing this currently

## 2023-03-30 NOTE — PROGRESS NOTES
"Subjective:     CC: Diagnoses of Thyroid nodule, High cholesterol, Age-related osteoporosis with current pathological fracture with routine healing, subsequent encounter, Encounter for screening mammogram for breast cancer, and Obesity (BMI 30-39.9) were pertinent to this visit.  Pt presents to follow up and review labs.  used for the visit [id number] evangelist.    HPI:   Hortencia presents today with    Problem   Obesity (Bmi 30-39.9)   Age-Related Osteoporosis With Current Pathological Fracture   High Cholesterol   Thyroid Nodule       Health Maintenance: Completed    ROS:  Review of Systems   Musculoskeletal:  Positive for back pain.   Skin:         Pt reports redness, itching, and hives with certain irritants to forearms and face. Including plastic shopping bags, husbands beard-has been going on for over 10 years. Would like to have blood work for further evaluation.    All other systems reviewed and are negative.    Objective:     Exam:  /70 (BP Location: Left arm, Patient Position: Sitting, BP Cuff Size: Small adult)   Pulse 84   Temp 36 °C (96.8 °F) (Temporal)   Resp 16   Ht 1.727 m (5' 8\")   Wt 94.3 kg (208 lb)   LMP 11/27/2012   SpO2 96%   BMI 31.63 kg/m²  Body mass index is 31.63 kg/m².    Physical Exam  Vitals reviewed.   Constitutional:       General: She is not in acute distress.     Appearance: She is not ill-appearing.   HENT:      Head: Normocephalic and atraumatic.   Neck:      Thyroid: Thyromegaly present.      Trachea: Trachea normal.   Cardiovascular:      Rate and Rhythm: Normal rate and regular rhythm.      Pulses: Normal pulses.      Heart sounds: No murmur heard.  Pulmonary:      Effort: Pulmonary effort is normal. No respiratory distress.   Musculoskeletal:      Cervical back: Full passive range of motion without pain.   Lymphadenopathy:      Cervical: No cervical adenopathy.   Skin:     General: Skin is warm and dry.      Capillary Refill: Capillary refill takes less than " 2 seconds.      Findings: No lesion or rash.   Neurological:      General: No focal deficit present.      Mental Status: She is alert and oriented to person, place, and time.   Psychiatric:         Mood and Affect: Mood normal.         Behavior: Behavior normal.       Labs:    Latest Reference Range & Units 03/02/23 10:25   WBC 4.8 - 10.8 K/uL 7.6   RBC 4.20 - 5.40 M/uL 5.00   Hemoglobin 12.0 - 16.0 g/dL 14.9   Hematocrit 37.0 - 47.0 % 45.2   MCV 81.4 - 97.8 fL 90.4   MCH 27.0 - 33.0 pg 29.8   MCHC 33.6 - 35.0 g/dL 33.0 (L)   RDW 35.9 - 50.0 fL 41.5   Platelet Count 164 - 446 K/uL 289   MPV 9.0 - 12.9 fL 11.4   Neutrophils-Polys 44.00 - 72.00 % 57.10   Neutrophils (Absolute) 2.00 - 7.15 K/uL 4.34   Lymphocytes 22.00 - 41.00 % 34.30   Lymphs (Absolute) 1.00 - 4.80 K/uL 2.61   Monocytes 0.00 - 13.40 % 6.40   Monos (Absolute) 0.00 - 0.85 K/uL 0.49   Eosinophils 0.00 - 6.90 % 1.40   Eos (Absolute) 0.00 - 0.51 K/uL 0.11   Basophils 0.00 - 1.80 % 0.40   Baso (Absolute) 0.00 - 0.12 K/uL 0.03   Immature Granulocytes 0.00 - 0.90 % 0.40   Immature Granulocytes (abs) 0.00 - 0.11 K/uL 0.03   Nucleated RBC /100 WBC 0.00   NRBC (Absolute) K/uL 0.00   Sodium 135 - 145 mmol/L 139   Potassium 3.6 - 5.5 mmol/L 4.3   Chloride 96 - 112 mmol/L 102   Co2 20 - 33 mmol/L 27   Anion Gap 7.0 - 16.0  10.0   Glucose 65 - 99 mg/dL 95   Bun 8 - 22 mg/dL 15   Creatinine 0.50 - 1.40 mg/dL 0.59   GFR (CKD-EPI) >60 mL/min/1.73 m 2 107   Calcium 8.5 - 10.5 mg/dL 9.5   Correct Calcium 8.5 - 10.5 mg/dL 9.2   AST(SGOT) 12 - 45 U/L 24   ALT(SGPT) 2 - 50 U/L 37   Alkaline Phosphatase 30 - 99 U/L 101 (H)   Total Bilirubin 0.1 - 1.5 mg/dL 0.4   Albumin 3.2 - 4.9 g/dL 4.4   Total Protein 6.0 - 8.2 g/dL 8.3 (H)   Globulin 1.9 - 3.5 g/dL 3.9 (H)   A-G Ratio g/dL 1.1   Total Protein, Urine mg/dL 6   Total Protein, 24 Hour Urine 40 - 150 mg/d Not Applicable   Collection Length Hrs Random   Total Volume mL Random   Albumin %, Urine % 48.8   Alpha-1 %, Urine %  11.7   Alpha-2 %, Urine % 18.1   Beta Globulin %, Urine % 16.3   Gamma Globulin %, Urine % 5.1   Paraprotein %, Urine % 0.0   Paraprotein Excretion/24 Hour mg/24 hrs Not Applicable   EER Monoclonal Protein Study, 24 Hour U  See Note   Immunoglobulin A 68 - 408 mg/dL 175   Immunoglobulin G 768 - 1632 mg/dL 1664 (H)   Immunoglobulin M 35 - 263 mg/dL 155   Interpretation  See Note  See Note   Albumin 3.75 - 5.01 g/dL 4.31   Gamma Globulin 0.62 - 1.51 g/dL 1.52 (H)   Alpha-1 Globulin 0.19 - 0.46 g/dL 0.31   Alpha-2 Globulin 0.48 - 1.05 g/dL 0.88   Beta Globulin 0.48 - 1.10 g/dL 0.88   Free Kappa Light Chains 3.30 - 19.40 mg/L 21.96 (H)   Free Lambda Light Chains 5.71 - 26.30 mg/L 19.31   Kappa-Lambda Ratio 0.26 - 1.65  1.14   Immunofixation  TEJAS Done   Monoclonal Protein g/dL Not Applicable   EER Monoclonal Protein and FLC, Serum  See Note   Total Protein, Serum 6.3 - 8.2 g/dL 7.9   (L): Data is abnormally low  (H): Data is abnormally high    Assessment & Plan:     52 y.o. female with the following -     Problem List Items Addressed This Visit       Thyroid nodule     Chronic, unstable.  Additional nodule noted to thyroid from 2021.  3 nodules, total.  2 appear to have grown in size from previous ultrasound.  Cervical lymph node incidental finding on most recent ultrasound  E consult to endocrinology  Consider FNA         Relevant Orders    E-consult to Endocrinology    High cholesterol     Chronic, unstable. Discussed healthy lifestyle recommendations.   The 10-year ASCVD risk score (Raheem DK, et al., 2019) is: 1.6%           Age-related osteoporosis with current pathological fracture     Chronic, unstable. Has upcoming biopsy and cement to stabilize fracture scheduled 4/6/23, Luana Smalls is managing this currently         Obesity (BMI 30-39.9)    Relevant Orders    Patient identified as having weight management issue.  Appropriate orders and counseling given.     Other Visit Diagnoses       Encounter for  screening mammogram for breast cancer        Relevant Orders    MA-SCREENING MAMMO BILAT W/TOMOSYNTHESIS W/CAD          Supportive care, differential diagnoses, and indications for immediate follow-up discussed with patient.    Pathogenesis of diagnosis discussed including typical length and natural progression.    Instructed to return to clinic or nearest emergency department for any change in condition, further concerns, or worsening of symptoms.  Patient states understanding of the plan of care and discharge instructions.    I have placed the below orders and discussed them with an approved delegating provider.  The MA is performing the below orders under the direction of Dr. Treviño.    I spent a total of 28 minutes with record review, exam, communication with the patient, communication with other providers, and documentation of this encounter.    Return in about 3 months (around 6/30/2023), or if symptoms worsen or fail to improve, for wellness.    Please note that this dictation was created using voice recognition software. I have made every reasonable attempt to correct obvious errors, but I expect that there are errors of grammar and possibly content that I did not discover before finalizing the note.

## 2023-03-30 NOTE — ASSESSMENT & PLAN NOTE
Chronic, unstable. Discussed healthy lifestyle recommendations.   The 10-year ASCVD risk score (Raheem CEHUNG, et al., 2019) is: 1.6%

## 2023-04-01 ENCOUNTER — E-CONSULT (OUTPATIENT)
Dept: ENDOCRINOLOGY | Facility: MEDICAL CENTER | Age: 53
End: 2023-04-01
Payer: COMMERCIAL

## 2023-04-01 DIAGNOSIS — Z71.9 ENCOUNTER FOR CONSULTATION: ICD-10-CM

## 2023-04-01 PROCEDURE — 99999 PR NO CHARGE: CPT | Performed by: INTERNAL MEDICINE

## 2023-04-02 NOTE — PROGRESS NOTES
E-Consult Response     After careful review of the patient's information available in the medical record, the following are my findings and recommendations:    Reason for consult:  recommendations for MNG    Summary of data reviewed: In summary this is a 53 y/o female born on 1970 with MNG  diagnosed in 2011 but possibly longer as this is a retrospective review limited by the EMR.   Patient underwent biopsy for a dominant nodule in 2011 on the RLL which came back as benign    The same nodule has progressively increased in size from 2011 to 2023 from 2.7 cm to 3.7 cm     TSH levels are not available     Last US on 3/21/2023 showed the following:    Nodule #1  Location:  Right  mid  Size:  1.0 x 1.2 x 0.6 cm  Composition:  Solid-2  Echogenicity:  Isoechoic-1  Shape:  Wider than tall-0  Margins:  Smooth-0  Echogenic Foci:  None-0     ACR TIRADS points/category:  3 - TR3 - Mildly Suspicious     Nodule #2  Location:  Right  lower  Size:  3.7 x 4.2 x 2.5 cm  Composition:  Solid-2  Echogenicity:  Hypoechoic-2  Shape:  Wider than tall-0  Margins:  Smooth-0  Echogenic Foci:  Macroscopic-1     ACR TIRADS points/category:  5 - TR4 - Moderately Suspicious     Nodule #3  Location:  Left  lower  Size:  1.3 x 0.9 x 1.0 cm  Composition:  Spongiform-0  Echogenicity:  Hypoechoic-2  Shape:  Taller than wide-3  Margins:  Smooth-0  Echogenic Foci:  None-0     ACR TIRADS points/category:  5 - TR4 - Moderately Suspicious      Recommendations: I personally reviewed the images from US in 2011, 2019, and 2023    Nodule number one is isoechoic and low risk  or low suspicion based on AUNDREA guidelines as such biopsy is not needed ( only if > 2.0 cm)    Nodule number 2 is the one biopsied in 2011 and it has grown over the years.  The first biopsy was benign  US from 2019 and 2023 shows minimal difference in size    Please take note that size is not the main characteristic we decide for biopsy - changes in sonographic features that are high  risk are also considered such as:  microcalcifications   irregular margins  extrathyroidal extension  taller than wide    - I did not see this for nodule number two    Thus since the measurement between 2019 and 2023 is similar and there is no big change in US features I dont recommend biopsy    Nodule 3 is hypoehoic and is of intermedia suspicion and has never been biopsied    I would recommend biopsy of this one  just based strictly on guidelines alone.      FYI [facility] can do US and biopsies in the office once patient is established with us.  I personally conduct US and biopsies -         E-Consult Time: 31 minutes were spent with >50% of the total time spent discussing plan of care with requesting physician (Use code [id number])    Nazario Gonzales M.D.

## 2023-04-03 ENCOUNTER — TELEPHONE (OUTPATIENT)
Dept: MEDICAL GROUP | Facility: PHYSICIAN GROUP | Age: 53
End: 2023-04-03
Payer: COMMERCIAL

## 2023-04-03 NOTE — TELEPHONE ENCOUNTER
----- Message from ELIJAH Palacios sent at 3/31/2023 10:25 AM PDT -----  Regarding: FW: thyroid nodules  Would you please call patient and let her know Luana has arranged for biopsy to occur at the same time she gets her back surgery.  Thank you,  YUE Mcdowell    ----- Message -----  From: DALE Arenas  Sent: 3/30/2023   5:38 PM PDT  To: ELIJAH Palacios  Subject: RE: thyroid nodules                              Of course, and let me know what endocrine says because it be interesting to see what their point of view is on this.  :)  ----- Message -----  From: ELIJAH Palacios  Sent: 3/30/2023   3:58 PM PDT  To: DALE Arenas  Subject: RE: thyroid nodules                              Oh! Ha- Awesome. That is perfect. I think she tried to communicate that to me, but I was not understanding what she was trying to say. It can be a little challenging with interpretation sometimes- and I am not certain she is clear on the plan, which is why she asks-this individual is a little tricky for that reason and I am so glad to have you on her team.  You are a -   Thank you,  YUE Mcdowell    ----- Message -----  From: DALE Arenas  Sent: 3/30/2023  11:56 AM PDT  To: ELIJAH Palacios  Subject: RE: thyroid nodules                              Kimmie,    No worries about those nodules. No need for endocrine but since you placed the e-consult we will see what they say.     But not certain if you saw that I ordered a biopsy of the thyroid nodule and she is having that next week.     Luana   ----- Message -----  From: ELIJAH Palacios  Sent: 3/30/2023  11:20 AM PDT  To: DALE Arenas  Subject: thyroid nodules                                  Karissa Craft.  I hope you are well  Thank you for all your help with this patient, she is so pleased by the care that you have provided her  and feels so much more comfortable with you.  Recently she had follow-up ultrasound of thyroid, with increasing size of nodules that were previously there, as well as an additional nodule.  I have put in an E consult with endocrinology for this, but given her current concerns with her back we discussed we can look at this further after her back surgery.  I did put an E consult in for endocrinology opinion.  Thank you,  YUE Mcdowell

## 2023-04-06 ENCOUNTER — HOSPITAL ENCOUNTER (OUTPATIENT)
Facility: MEDICAL CENTER | Age: 53
End: 2023-04-06
Attending: RADIOLOGY | Admitting: RADIOLOGY
Payer: COMMERCIAL

## 2023-04-06 ENCOUNTER — APPOINTMENT (OUTPATIENT)
Dept: RADIOLOGY | Facility: MEDICAL CENTER | Age: 53
End: 2023-04-06
Attending: RADIOLOGY
Payer: COMMERCIAL

## 2023-04-06 ENCOUNTER — ANESTHESIA (OUTPATIENT)
Dept: RADIOLOGY | Facility: MEDICAL CENTER | Age: 53
End: 2023-04-06
Payer: COMMERCIAL

## 2023-04-06 ENCOUNTER — ANESTHESIA EVENT (OUTPATIENT)
Dept: RADIOLOGY | Facility: MEDICAL CENTER | Age: 53
End: 2023-04-06
Payer: COMMERCIAL

## 2023-04-06 VITALS
BODY MASS INDEX: 31.34 KG/M2 | TEMPERATURE: 96.9 F | HEART RATE: 57 BPM | RESPIRATION RATE: 16 BRPM | SYSTOLIC BLOOD PRESSURE: 117 MMHG | DIASTOLIC BLOOD PRESSURE: 56 MMHG | HEIGHT: 68 IN | OXYGEN SATURATION: 97 % | WEIGHT: 206.79 LBS

## 2023-04-06 DIAGNOSIS — S22.080G T12 COMPRESSION FRACTURE, WITH DELAYED HEALING, SUBSEQUENT ENCOUNTER: ICD-10-CM

## 2023-04-06 DIAGNOSIS — R93.7 ABNORMAL MAGNETIC RESONANCE IMAGING OF THORACIC SPINE: ICD-10-CM

## 2023-04-06 LAB
ANION GAP SERPL CALC-SCNC: 7 MMOL/L (ref 7–16)
BUN SERPL-MCNC: 18 MG/DL (ref 8–22)
CALCIUM SERPL-MCNC: 9.5 MG/DL (ref 8.5–10.5)
CHLORIDE SERPL-SCNC: 105 MMOL/L (ref 96–112)
CO2 SERPL-SCNC: 29 MMOL/L (ref 20–33)
CREAT SERPL-MCNC: 0.6 MG/DL (ref 0.5–1.4)
ERYTHROCYTE [DISTWIDTH] IN BLOOD BY AUTOMATED COUNT: 41 FL (ref 35.9–50)
GFR SERPLBLD CREATININE-BSD FMLA CKD-EPI: 106 ML/MIN/1.73 M 2
GLUCOSE SERPL-MCNC: 90 MG/DL (ref 65–99)
HCT VFR BLD AUTO: 41 % (ref 37–47)
HGB BLD-MCNC: 13.7 G/DL (ref 12–16)
INR PPP: 0.98 (ref 0.87–1.13)
MCH RBC QN AUTO: 29.8 PG (ref 27–33)
MCHC RBC AUTO-ENTMCNC: 33.4 G/DL (ref 33.6–35)
MCV RBC AUTO: 89.1 FL (ref 81.4–97.8)
PATHOLOGY CONSULT NOTE: NORMAL
PLATELET # BLD AUTO: 231 K/UL (ref 164–446)
PMV BLD AUTO: 10.8 FL (ref 9–12.9)
POTASSIUM SERPL-SCNC: 4.3 MMOL/L (ref 3.6–5.5)
PROTHROMBIN TIME: 12.9 SEC (ref 12–14.6)
RBC # BLD AUTO: 4.6 M/UL (ref 4.2–5.4)
SODIUM SERPL-SCNC: 141 MMOL/L (ref 135–145)
WBC # BLD AUTO: 7.8 K/UL (ref 4.8–10.8)

## 2023-04-06 PROCEDURE — 700111 HCHG RX REV CODE 636 W/ 250 OVERRIDE (IP): Performed by: ANESTHESIOLOGY

## 2023-04-06 PROCEDURE — 88369 M/PHMTRC ALYSISHQUANT/SEMIQ: CPT

## 2023-04-06 PROCEDURE — 88342 IMHCHEM/IMCYTCHM 1ST ANTB: CPT

## 2023-04-06 PROCEDURE — A9270 NON-COVERED ITEM OR SERVICE: HCPCS | Performed by: ANESTHESIOLOGY

## 2023-04-06 PROCEDURE — 700101 HCHG RX REV CODE 250

## 2023-04-06 PROCEDURE — 80048 BASIC METABOLIC PNL TOTAL CA: CPT

## 2023-04-06 PROCEDURE — 700102 HCHG RX REV CODE 250 W/ 637 OVERRIDE(OP): Performed by: ANESTHESIOLOGY

## 2023-04-06 PROCEDURE — 85610 PROTHROMBIN TIME: CPT

## 2023-04-06 PROCEDURE — 01941 ANES NEUROMD/NTRVRT CRV/THRC: CPT | Performed by: ANESTHESIOLOGY

## 2023-04-06 PROCEDURE — 88307 TISSUE EXAM BY PATHOLOGIST: CPT

## 2023-04-06 PROCEDURE — 88368 INSITU HYBRIDIZATION MANUAL: CPT

## 2023-04-06 PROCEDURE — 160036 HCHG PACU - EA ADDL 30 MINS PHASE I

## 2023-04-06 PROCEDURE — 700105 HCHG RX REV CODE 258: Performed by: RADIOLOGY

## 2023-04-06 PROCEDURE — 160046 HCHG PACU - 1ST 60 MINS PHASE II

## 2023-04-06 PROCEDURE — 160035 HCHG PACU - 1ST 60 MINS PHASE I

## 2023-04-06 PROCEDURE — 88311 DECALCIFY TISSUE: CPT

## 2023-04-06 PROCEDURE — 85027 COMPLETE CBC AUTOMATED: CPT

## 2023-04-06 PROCEDURE — 88341 IMHCHEM/IMCYTCHM EA ADD ANTB: CPT

## 2023-04-06 PROCEDURE — 700101 HCHG RX REV CODE 250: Performed by: ANESTHESIOLOGY

## 2023-04-06 PROCEDURE — 160002 HCHG RECOVERY MINUTES (STAT)

## 2023-04-06 PROCEDURE — 4410588 IR-KYPHOPLASTY,1 VERTEBRA,THOR

## 2023-04-06 RX ORDER — SODIUM CHLORIDE, SODIUM LACTATE, POTASSIUM CHLORIDE, CALCIUM CHLORIDE 600; 310; 30; 20 MG/100ML; MG/100ML; MG/100ML; MG/100ML
INJECTION, SOLUTION INTRAVENOUS CONTINUOUS
Status: ACTIVE | OUTPATIENT
Start: 2023-04-06 | End: 2023-04-06

## 2023-04-06 RX ORDER — HYDROMORPHONE HYDROCHLORIDE 1 MG/ML
0.5 INJECTION, SOLUTION INTRAMUSCULAR; INTRAVENOUS; SUBCUTANEOUS
Status: DISCONTINUED | OUTPATIENT
Start: 2023-04-06 | End: 2023-04-06 | Stop reason: HOSPADM

## 2023-04-06 RX ORDER — LIDOCAINE HYDROCHLORIDE 10 MG/ML
INJECTION, SOLUTION INFILTRATION; PERINEURAL
Status: COMPLETED
Start: 2023-04-06 | End: 2023-04-06

## 2023-04-06 RX ORDER — OXYCODONE HCL 5 MG/5 ML
10 SOLUTION, ORAL ORAL
Status: COMPLETED | OUTPATIENT
Start: 2023-04-06 | End: 2023-04-06

## 2023-04-06 RX ORDER — HYDROMORPHONE HYDROCHLORIDE 1 MG/ML
0.4 INJECTION, SOLUTION INTRAMUSCULAR; INTRAVENOUS; SUBCUTANEOUS
Status: DISCONTINUED | OUTPATIENT
Start: 2023-04-06 | End: 2023-04-06 | Stop reason: HOSPADM

## 2023-04-06 RX ORDER — ONDANSETRON 2 MG/ML
4 INJECTION INTRAMUSCULAR; INTRAVENOUS EVERY 8 HOURS PRN
Status: DISCONTINUED | OUTPATIENT
Start: 2023-04-06 | End: 2023-04-06 | Stop reason: HOSPADM

## 2023-04-06 RX ORDER — MIDAZOLAM HYDROCHLORIDE 1 MG/ML
INJECTION INTRAMUSCULAR; INTRAVENOUS
Status: DISCONTINUED
Start: 2023-04-06 | End: 2023-04-06 | Stop reason: HOSPADM

## 2023-04-06 RX ORDER — DIPHENHYDRAMINE HYDROCHLORIDE 50 MG/ML
12.5 INJECTION INTRAMUSCULAR; INTRAVENOUS
Status: DISCONTINUED | OUTPATIENT
Start: 2023-04-06 | End: 2023-04-06 | Stop reason: HOSPADM

## 2023-04-06 RX ORDER — HYDROMORPHONE HYDROCHLORIDE 1 MG/ML
0.2 INJECTION, SOLUTION INTRAMUSCULAR; INTRAVENOUS; SUBCUTANEOUS
Status: DISCONTINUED | OUTPATIENT
Start: 2023-04-06 | End: 2023-04-06 | Stop reason: HOSPADM

## 2023-04-06 RX ORDER — SODIUM CHLORIDE, SODIUM LACTATE, POTASSIUM CHLORIDE, CALCIUM CHLORIDE 600; 310; 30; 20 MG/100ML; MG/100ML; MG/100ML; MG/100ML
INJECTION, SOLUTION INTRAVENOUS CONTINUOUS
Status: DISCONTINUED | OUTPATIENT
Start: 2023-04-06 | End: 2023-04-06 | Stop reason: HOSPADM

## 2023-04-06 RX ORDER — ONDANSETRON 2 MG/ML
INJECTION INTRAMUSCULAR; INTRAVENOUS PRN
Status: DISCONTINUED | OUTPATIENT
Start: 2023-04-06 | End: 2023-04-06 | Stop reason: SURG

## 2023-04-06 RX ORDER — LIDOCAINE HYDROCHLORIDE 20 MG/ML
INJECTION, SOLUTION EPIDURAL; INFILTRATION; INTRACAUDAL; PERINEURAL PRN
Status: DISCONTINUED | OUTPATIENT
Start: 2023-04-06 | End: 2023-04-06 | Stop reason: SURG

## 2023-04-06 RX ORDER — CEFAZOLIN SODIUM 1 G/3ML
INJECTION, POWDER, FOR SOLUTION INTRAMUSCULAR; INTRAVENOUS PRN
Status: DISCONTINUED | OUTPATIENT
Start: 2023-04-06 | End: 2023-04-06 | Stop reason: SURG

## 2023-04-06 RX ORDER — HYDROMORPHONE HYDROCHLORIDE 1 MG/ML
0.25 INJECTION, SOLUTION INTRAMUSCULAR; INTRAVENOUS; SUBCUTANEOUS
Status: DISCONTINUED | OUTPATIENT
Start: 2023-04-06 | End: 2023-04-06 | Stop reason: HOSPADM

## 2023-04-06 RX ORDER — OXYCODONE HCL 5 MG/5 ML
5 SOLUTION, ORAL ORAL
Status: COMPLETED | OUTPATIENT
Start: 2023-04-06 | End: 2023-04-06

## 2023-04-06 RX ORDER — HYDRALAZINE HYDROCHLORIDE 20 MG/ML
5 INJECTION INTRAMUSCULAR; INTRAVENOUS
Status: DISCONTINUED | OUTPATIENT
Start: 2023-04-06 | End: 2023-04-06 | Stop reason: HOSPADM

## 2023-04-06 RX ORDER — MIDAZOLAM HYDROCHLORIDE 1 MG/ML
INJECTION INTRAMUSCULAR; INTRAVENOUS
Status: COMPLETED
Start: 2023-04-06 | End: 2023-04-06

## 2023-04-06 RX ORDER — DEXAMETHASONE SODIUM PHOSPHATE 4 MG/ML
INJECTION, SOLUTION INTRA-ARTICULAR; INTRALESIONAL; INTRAMUSCULAR; INTRAVENOUS; SOFT TISSUE PRN
Status: DISCONTINUED | OUTPATIENT
Start: 2023-04-06 | End: 2023-04-06 | Stop reason: SURG

## 2023-04-06 RX ORDER — HALOPERIDOL 5 MG/ML
1 INJECTION INTRAMUSCULAR
Status: DISCONTINUED | OUTPATIENT
Start: 2023-04-06 | End: 2023-04-06 | Stop reason: HOSPADM

## 2023-04-06 RX ORDER — EPHEDRINE SULFATE 50 MG/ML
5 INJECTION, SOLUTION INTRAVENOUS
Status: DISCONTINUED | OUTPATIENT
Start: 2023-04-06 | End: 2023-04-06 | Stop reason: HOSPADM

## 2023-04-06 RX ORDER — MEPERIDINE HYDROCHLORIDE 25 MG/ML
12.5 INJECTION INTRAMUSCULAR; INTRAVENOUS; SUBCUTANEOUS
Status: DISCONTINUED | OUTPATIENT
Start: 2023-04-06 | End: 2023-04-06 | Stop reason: HOSPADM

## 2023-04-06 RX ORDER — OXYCODONE HYDROCHLORIDE 5 MG/1
2.5 TABLET ORAL
Status: DISCONTINUED | OUTPATIENT
Start: 2023-04-06 | End: 2023-04-06 | Stop reason: HOSPADM

## 2023-04-06 RX ADMIN — OXYCODONE HYDROCHLORIDE 10 MG: 5 SOLUTION ORAL at 11:11

## 2023-04-06 RX ADMIN — MIDAZOLAM 2 MG: 1 INJECTION INTRAMUSCULAR; INTRAVENOUS at 08:49

## 2023-04-06 RX ADMIN — ROCURONIUM BROMIDE 50 MG: 10 INJECTION, SOLUTION INTRAVENOUS at 08:56

## 2023-04-06 RX ADMIN — SUGAMMADEX 200 MG: 100 INJECTION, SOLUTION INTRAVENOUS at 10:15

## 2023-04-06 RX ADMIN — SODIUM CHLORIDE, POTASSIUM CHLORIDE, SODIUM LACTATE AND CALCIUM CHLORIDE: 600; 310; 30; 20 INJECTION, SOLUTION INTRAVENOUS at 08:49

## 2023-04-06 RX ADMIN — FENTANYL CITRATE 50 MCG: 50 INJECTION, SOLUTION INTRAMUSCULAR; INTRAVENOUS at 11:16

## 2023-04-06 RX ADMIN — FENTANYL CITRATE 50 MCG: 50 INJECTION, SOLUTION INTRAMUSCULAR; INTRAVENOUS at 08:55

## 2023-04-06 RX ADMIN — ONDANSETRON 4 MG: 2 INJECTION INTRAMUSCULAR; INTRAVENOUS at 10:10

## 2023-04-06 RX ADMIN — Medication 0.5 ML: at 08:15

## 2023-04-06 RX ADMIN — PROPOFOL 150 MG: 10 INJECTION, EMULSION INTRAVENOUS at 08:55

## 2023-04-06 RX ADMIN — LIDOCAINE HYDROCHLORIDE 0.5 ML: 10 INJECTION, SOLUTION INFILTRATION; PERINEURAL at 08:15

## 2023-04-06 RX ADMIN — CEFAZOLIN 2 G: 330 INJECTION, POWDER, FOR SOLUTION INTRAMUSCULAR; INTRAVENOUS at 09:02

## 2023-04-06 RX ADMIN — LIDOCAINE HYDROCHLORIDE 80 MG: 20 INJECTION, SOLUTION EPIDURAL; INFILTRATION; INTRACAUDAL at 08:55

## 2023-04-06 RX ADMIN — DEXAMETHASONE SODIUM PHOSPHATE 4 MG: 4 INJECTION, SOLUTION INTRA-ARTICULAR; INTRALESIONAL; INTRAMUSCULAR; INTRAVENOUS; SOFT TISSUE at 09:02

## 2023-04-06 ASSESSMENT — PAIN DESCRIPTION - PAIN TYPE: TYPE: CHRONIC PAIN

## 2023-04-06 ASSESSMENT — FIBROSIS 4 INDEX: FIB4 SCORE: 0.74

## 2023-04-06 NOTE — OR NURSING
1028: Pt arrived from IR post T12 kyphoplasty and biopsy under anesthesia. Pt is asleep. Sight dressing is CDI. Cardiac rhythm appears to be SB.     1042: Pt awake; reports  nausea. Pt reports minimal pain.     1111: Pt tolerating orals; medicated per MAR.     1125: Updated pt's spouse, Guillermo.     1240: Report to ANGELI Jacob.     1246: Pt to phase II via gurney with CNA. Dressing is CDI.

## 2023-04-06 NOTE — ANESTHESIA TIME REPORT
Anesthesia Start and Stop Event Times     Date Time Event    4/6/2023 0833 Ready for Procedure     0849 Anesthesia Start     1037 Anesthesia Stop        Responsible Staff  04/06/23    Name Role Begin End    Shane Villegas M.D. Anesth 0849 1037        Overtime Reason:  no overtime (within assigned shift)    Comments:

## 2023-04-06 NOTE — ANESTHESIA PROCEDURE NOTES
Airway    Date/Time: 4/6/2023 8:58 AM  Performed by: Shane Villegas M.D.  Authorized by: Shane Villegas M.D.     Location:  OR  Urgency:  Elective  Difficult Airway: No    Indications for Airway Management:  Anesthesia      Spontaneous Ventilation: absent    Sedation Level:  Deep  Preoxygenated: Yes    Patient Position:  Sniffing  Mask Difficulty Assessment:  1 - vent by mask  Final Airway Type:  Endotracheal airway  Final Endotracheal Airway:  ETT  Cuffed: Yes    Technique Used for Successful ETT Placement:  Video laryngoscopy    Insertion Site:  Oral  Blade Type:  Glide  Laryngoscope Blade/Videolaryngoscope Blade Size:  3  ETT Size (mm):  7.0  Measured from:  Teeth  ETT to Teeth (cm):  21  Placement Verified by: auscultation and capnometry    Cormack-Lehane Classification:  Grade I - full view of glottis  Number of Attempts at Approach:  1   Attempted with Bal 2, Grade IV view. Easy intubation with Glidescope

## 2023-04-06 NOTE — OR SURGEON
Immediate Post- Operative Note        PostOp Diagnosis: VERTEBRAL INSUFFICIENCY FRACTURE AT T12. BX ALSO REQUESTED DUE TO SOME CLINICAL CONCERN FOR MULTIPLE MYELOMA.      Procedure(s): THORACIC KYPHOPLASTY AND BONE BX AT T12      Estimated Blood Loss: <5 ML      FINDINGS: SATISFACTORY CEMENT DISTRIBUTION. TOTAL 6 ML APPLIED. SUBACUTE PARTIALLY HEALED FX REQUIRING BALLOON PRESSURES UP  PSI        Complications: NONE            4/6/2023             10:22 AM               Valeriano Castrejon M.D.

## 2023-04-06 NOTE — ANESTHESIA POSTPROCEDURE EVALUATION
Patient: Hortencia Bonilla    Procedure Summary     Date: 04/06/23 Room / Location: [facility]    Anesthesia Start: 0849 Anesthesia Stop: 1037    Procedure: IR-KYPHOPLASTY,1 VERTEBRA,THOR Diagnosis:       T12 compression fracture, with delayed healing, subsequent encounter      Abnormal magnetic resonance imaging of thoracic spine      (Vertebrae Fracture)      (Biopsy to r/o CA, T12 vertebral augmentation)    Scheduled Providers: Valeriano Castrejon M.D.; Shane Villegas M.D. Responsible Provider: Shane Villegas M.D.    Anesthesia Type: general ASA Status: 1          Final Anesthesia Type: general  Last vitals  BP   Blood Pressure: 124/61    Temp   36.1 °C (96.9 °F)    Pulse   (!) 55   Resp   16    SpO2   99 %      Anesthesia Post Evaluation    Patient location during evaluation: PACU  Patient participation: complete - patient participated  Level of consciousness: sleepy but conscious    Airway patency: patent  Anesthetic complications: no  Cardiovascular status: hemodynamically stable  Respiratory status: acceptable  Hydration status: balanced    PONV: none          No notable events documented.     Nurse Pain Score: 3 (NPRS)

## 2023-04-06 NOTE — ANESTHESIA PREPROCEDURE EVALUATION
Date/Time: 04/06/23 0830    Scheduled providers: Valeriano Castrejon M.D.; Shane Villegas M.D.    Procedure: IR-KYPHOPLASTY,1 VERTEBRA,THOR    Diagnosis:       T12 compression fracture, with delayed healing, subsequent encounter [S22.080G]      Abnormal magnetic resonance imaging of thoracic spine [R93.7]    Indications:       Vertebrae Fracture      Biopsy to r/o CA, T12 vertebral augmentation    Location: [facility]          Relevant Problems   NEURO   (positive) New daily persistent headache      Other   (positive) Bipolar disorder (HCC)   (positive) Bone disease   (positive) Chronic bilateral thoracic back pain   (positive) Degenerative joint disease (DJD) of lumbar spine   (positive) History of seizure   (positive) Mass of left side of neck     Anes H&P:  PAST MEDICAL HISTORY:   52 y.o. female who presents for Procedure(s):  IR3-Biopsy T12 and vertebral augmentation-Dr. Castrejon-Anesthesia**Patient speaks Kittitian**.  She has current and past medical problems significant for:    Past Medical History:   Diagnosis Date   • Bipolar disorder (HCC)    • Thyroid nodule        SMOKING/ALCOHOL/RECREATIONAL DRUG USE:  Social History     Tobacco Use   • Smoking status: Never   • Smokeless tobacco: Never   Vaping Use   • Vaping Use: Never used   Substance Use Topics   • Alcohol use: No   • Drug use: No     Social History     Substance and Sexual Activity   Drug Use No       PAST SURGICAL HISTORY:  Past Surgical History:   Procedure Laterality Date   • PRIMARY C SECTION      x 1       ALLERGIES:   Allergies   Allergen Reactions   • Bloodless        MEDICATIONS:  No current facility-administered medications on file prior to encounter.     Current Outpatient Medications on File Prior to Encounter   Medication Sig Dispense Refill   • Cyanocobalamin (VITAMIN B12 PO) Take 2 Tabs by mouth every day.     • Cholecalciferol (VITAMIN D) 2000 UNIT Tab Take 4,000 Units by mouth every day.     •  MAGNESIUM PO Take 1-2 Tabs by mouth every day.     • Omega-3 Fatty Acids (OMEGA 3 PO) Take  by mouth.         LABS:  Lab Results   Component Value Date/Time    HEMOGLOBIN 14.9 03/02/2023 1025    HEMATOCRIT 45.2 03/02/2023 1025    WBC 7.6 03/02/2023 1025     Lab Results   Component Value Date/Time    SODIUM 139 03/02/2023 1025    POTASSIUM 4.3 03/02/2023 1025    CHLORIDE 102 03/02/2023 1025    CO2 27 03/02/2023 1025    GLUCOSE 95 03/02/2023 1025    BUN 15 03/02/2023 1025    CALCIUM 9.5 03/02/2023 1025         PREVIOUS ANESTHETICS: See EMR  __________________________________________      Physical Exam    Airway   Mallampati: II  TM distance: >3 FB  Neck ROM: full       Cardiovascular - normal exam  Rhythm: regular  Rate: normal  (-) murmur     Dental - normal exam           Pulmonary - normal exam  Breath sounds clear to auscultation     Abdominal   (+) obese     Neurological - normal exam                 Anesthesia Plan    ASA 1       Plan - general       Airway plan will be ETT          Induction: intravenous    Postoperative Plan: Postoperative administration of opioids is intended.    Pertinent diagnostic labs and testing reviewed    Informed Consent:    Anesthetic plan and risks discussed with patient and spouse.    Use of blood products discussed with: patient whom did not consent to blood products.

## 2023-04-06 NOTE — PROGRESS NOTES
Patient underwent a T12 Kyphoplasty and T12 Bone  Biopsy by Dr. Castrejon with General Anesthesia by Dr. Villegas. Time out performed at 0932. Procedure site marked by MD and verified using imaging guidance. Pt placed in prone position for procedure, pressure points checked, padded, and monitored appropriately. Vital signs and ETT all monitored continuously by Md Villegas (see anesthesia flowsheet).  A bordered gauze dressing placed over surgical site, CDI. Report called to PACU, RN. Pt transported via gurney with RN and GA on monitor to PACU. T12 bone biopsy sample collected by MD intra-op and delivered by this RN to lab.    Kyphon Xpede Bone Cement  REF: [id number]  LOT: [id number]  EXP: 08/31/2025

## 2023-04-06 NOTE — OR NURSING
Pre-op orders and assessment complete. Med req was not able to be completed r/t time constraints. Vital signs stable. Patient and family updated on plan of care.

## 2023-04-06 NOTE — DISCHARGE INSTRUCTIONS
HOME CARE INSTRUCTIONS    ACTIVITY: Rest and take it easy for the first 24 hours.  A responsible adult is recommended to remain with you during that time.  It is normal to feel sleepy.  We encourage you to not do anything that requires balance, judgment or coordination.    FOR 24 HOURS DO NOT:  Drive, operate machinery or run household appliances.  Drink beer or alcoholic beverages.  Make important decisions or sign legal documents.    SPECIAL INSTRUCTIONS: Needle Biopsy, Care After  These instructions tell you how to care for yourself after your procedure. Your doctor may also give you more specific instructions. Call your doctor if you have any problems or questions.  What can I expect after the procedure?  After the procedure, it is common to have:  Soreness.  Bruising.  Mild pain.  Follow these instructions at home:    Return to your normal activities as told by your doctor. Ask your doctor what activities are safe for you.  Take over-the-counter and prescription medicines only as told by your doctor.  Wash your hands with soap and water before you change your bandage (dressing). If you cannot use soap and water, use hand .  Follow instructions from your doctor about:  How to take care of your puncture site.  When and how to change your bandage.  When to remove your bandage.  Check your puncture site every day for signs of infection. Watch for:  Redness, swelling, or pain.  Fluid or blood.   Pus or a bad smell.  Warmth.  Do not take baths, swim, or use a hot tub until your doctor approves. Ask your doctor if you may take showers. You may only be allowed to take sponge baths.  Keep all follow-up visits as told by your doctor. This is important.  Contact a doctor if you have:  A fever.  Redness, swelling, or pain at the puncture site, and it lasts longer than a few days.  Fluid, blood, or pus coming from the puncture site.  Warmth coming from the puncture site.  Get help right away if:  You have a lot of  bleeding from the puncture site.  Summary  After the procedure, it is common to have soreness, bruising, or mild pain at the puncture site.  Check your puncture site every day for signs of infection, such as redness, swelling, or pain.  Get help right away if you have severe bleeding from your puncture site.  This information is not intended to replace advice given to you by your health care provider. Make sure you discuss any questions you have with your health care provider.  Document Released: 11/30/2009 Document Revised: 12/31/2018 Document Reviewed: 12/31/2018  [employer] Patient Education © 2020 [employer]      DIET: To avoid nausea, slowly advance diet as tolerated, avoiding spicy or greasy foods for the first day.  Add more substantial food to your diet according to your physician's instructions.  Babies can be fed formula or breast milk as soon as they are hungry.  INCREASE FLUIDS AND FIBER TO AVOID CONSTIPATION.    SURGICAL DRESSING/BATHING: Ok to remove dressing and shower in 24 hours. Do not submerge until approved by physician.    MEDICATIONS: Resume taking daily medication.  Take prescribed pain medication with food.  If no medication is prescribed, you may take non-aspirin pain medication if needed.  PAIN MEDICATION CAN BE VERY CONSTIPATING.  Take a stool softener or laxative such as senokot, pericolace, or milk of magnesia if needed.    A follow-up appointment should be arranged with your doctor in 1-2 weeks; call to schedule.    You should CALL YOUR PHYSICIAN if you develop:  Fever greater than 101 degrees F.  Pain not relieved by medication, or persistent nausea or vomiting.  Excessive bleeding (blood soaking through dressing) or unexpected drainage from the wound.  Extreme redness or swelling around the incision site, drainage of pus or foul smelling drainage.  Inability to urinate or empty your bladder within 8 hours.  Problems with breathing or chest pain.    You should call 911 if you develop  problems with breathing or chest pain.  If you are unable to contact your doctor or surgical center, you should go to the nearest emergency room or urgent care center.  Physician's telephone #: [phone]    MILD FLU-LIKE SYMPTOMS ARE NORMAL.  YOU MAY EXPERIENCE GENERALIZED MUSCLE ACHES, THROAT IRRITATION, HEADACHE AND/OR SOME NAUSEA.    If any questions arise, call your doctor.  If your doctor is not available, please feel free to call the Surgical Center at [phone].  The Center is open Monday through Friday from 7AM to 7PM.      A registered nurse may call you a few days after your surgery to see how you are doing after your procedure.    You may also receive a survey in the mail within the next two weeks and we ask that you take a few moments to complete the survey and return it to us.  Our goal is to provide you with very good care and we value your comments.     Depression / Suicide Risk    As you are discharged from this [facility] facility, it is important to learn how to keep safe from harming yourself.    Recognize the warning signs:  Abrupt changes in personality, positive or negative- including increase in energy   Giving away possessions  Change in eating patterns- significant weight changes-  positive or negative  Change in sleeping patterns- unable to sleep or sleeping all the time   Unwillingness or inability to communicate  Depression  Unusual sadness, discouragement and loneliness  Talk of wanting to die  Neglect of personal appearance   Rebelliousness- reckless behavior  Withdrawal from people/activities they love  Confusion- inability to concentrate     If you or a loved one observes any of these behaviors or has concerns about self-harm, here's what you can do:  Talk about it- your feelings and reasons for harming yourself  Remove any means that you might use to hurt yourself (examples: pills, rope, extension cords, firearm)  Get professional help from the community (Mental Health,  Substance Abuse, psychological counseling)  Do not be alone:Call your Safe Contact- someone whom you trust who will be there for you.  Call your local CRISIS HOTLINE 949-3592 or 557-408-0336  Call your local Children's Mobile Crisis Response Team [place] (214) 561-3626 or www.inDinero  Call the toll free National Suicide Prevention Hotlines   National Suicide Prevention Lifeline 823-214-[employer]J (4570)  [place] Line Network 800-SUICIDE (453-2855)    I acknowledge receipt and understanding of these Home Care instructions.

## 2023-04-12 ENCOUNTER — HOSPITAL ENCOUNTER (OUTPATIENT)
Dept: HEMATOLOGY ONCOLOGY | Facility: MEDICAL CENTER | Age: 53
End: 2023-04-12
Attending: NURSE PRACTITIONER
Payer: COMMERCIAL

## 2023-04-12 VITALS
OXYGEN SATURATION: 97 % | RESPIRATION RATE: 18 BRPM | WEIGHT: 208 LBS | TEMPERATURE: 98 F | DIASTOLIC BLOOD PRESSURE: 77 MMHG | HEIGHT: 68 IN | SYSTOLIC BLOOD PRESSURE: 130 MMHG | HEART RATE: 73 BPM | BODY MASS INDEX: 31.52 KG/M2

## 2023-04-12 DIAGNOSIS — S22.080A COMPRESSION FRACTURE OF T12 VERTEBRA, INITIAL ENCOUNTER (HCC): ICD-10-CM

## 2023-04-12 DIAGNOSIS — E04.1 THYROID NODULE: ICD-10-CM

## 2023-04-12 DIAGNOSIS — R93.7 ABNORMAL MRI, THORACIC SPINE: ICD-10-CM

## 2023-04-12 PROCEDURE — 99214 OFFICE O/P EST MOD 30 MIN: CPT | Performed by: NURSE PRACTITIONER

## 2023-04-12 PROCEDURE — 99212 OFFICE O/P EST SF 10 MIN: CPT | Performed by: NURSE PRACTITIONER

## 2023-04-12 ASSESSMENT — ENCOUNTER SYMPTOMS
BACK PAIN: 0
NERVOUS/ANXIOUS: 1
FEVER: 0
CHILLS: 0

## 2023-04-12 ASSESSMENT — PAIN SCALES - GENERAL: PAINLEVEL: NO PAIN

## 2023-04-12 ASSESSMENT — FIBROSIS 4 INDEX: FIB4 SCORE: 0.92
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
## 2023-04-13 NOTE — ADDENDUM NOTE
Encounter addended by: Charlotte Goodwin, Med Ass't on: 4/13/2023 8:24 AM   Actions taken: Charge Capture section accepted

## 2023-04-17 ENCOUNTER — OFFICE VISIT (OUTPATIENT)
Dept: MEDICAL GROUP | Facility: PHYSICIAN GROUP | Age: 53
End: 2023-04-17
Payer: COMMERCIAL

## 2023-04-17 VITALS
BODY MASS INDEX: 32.13 KG/M2 | HEART RATE: 67 BPM | RESPIRATION RATE: 16 BRPM | SYSTOLIC BLOOD PRESSURE: 126 MMHG | WEIGHT: 212 LBS | OXYGEN SATURATION: 97 % | DIASTOLIC BLOOD PRESSURE: 80 MMHG | TEMPERATURE: 97.7 F | HEIGHT: 68 IN

## 2023-04-17 DIAGNOSIS — S22.080G COMPRESSION FRACTURE OF T12 VERTEBRA WITH DELAYED HEALING: ICD-10-CM

## 2023-04-17 DIAGNOSIS — Z02.89 ENCOUNTER FOR COMPLETION OF FORM WITH PATIENT: ICD-10-CM

## 2023-04-17 DIAGNOSIS — E04.1 THYROID NODULE: ICD-10-CM

## 2023-04-17 PROCEDURE — 99214 OFFICE O/P EST MOD 30 MIN: CPT

## 2023-04-17 ASSESSMENT — FIBROSIS 4 INDEX: FIB4 SCORE: 0.92

## 2023-04-17 ASSESSMENT — ENCOUNTER SYMPTOMS: BACK PAIN: 0

## 2023-04-17 NOTE — LETTER
[facility]  [address]  [place] [address]     April 17, 2023    Patient: Hortencia Bonilla   YOB: 1970   Date of Visit: 4/17/2023       To Whom It May Concern:    Hortencia Bonilla was seen and treated in our department on 4/17/2023. She had surgery for her thoracic spine fracture 4/6/2023. Please excuse her for missed days from 4/2/23-4/21/23. If you need additional paperwork or information, please contact me.  [phone]      Sincerely,         JODI Palacios.

## 2023-04-17 NOTE — PROGRESS NOTES
"Subjective:     CC: Diagnoses of Encounter for completion of form with patient, Compression fracture of T12 vertebra with delayed healing, and Thyroid nodule were pertinent to this visit.    Pt presents today for return to work paperwork after having thoracic spine surgery completed 4/6/2023. She reports resolution of pain to her back. Does endorse soreness if she sits for too long, denies problems with walking standing. Reports soreness with going up stairs, or walking up hills.     HPI:   Hortencia presents today with    Problem   Compression Fracture of T12 Vertebra With Delayed Healing   Thyroid Nodule     ROS:  Review of Systems   Musculoskeletal:  Negative for back pain.   All other systems reviewed and are negative.    Objective:     Exam:  /80 (BP Location: Left arm, Patient Position: Sitting, BP Cuff Size: Small adult)   Pulse 67   Temp 36.5 °C (97.7 °F) (Temporal)   Resp 16   Ht 1.727 m (5' 8\")   Wt 96.2 kg (212 lb)   LMP 11/27/2012   SpO2 97%   BMI 32.23 kg/m²  Body mass index is 32.23 kg/m².    Physical Exam  Vitals reviewed.   Constitutional:       Appearance: Normal appearance.   HENT:      Head: Normocephalic and atraumatic.   Cardiovascular:      Rate and Rhythm: Normal rate and regular rhythm.      Pulses: Normal pulses.      Heart sounds: No murmur heard.  Pulmonary:      Effort: Pulmonary effort is normal. No respiratory distress.   Skin:     General: Skin is warm and dry.      Capillary Refill: Capillary refill takes less than 2 seconds.   Neurological:      General: No focal deficit present.      Mental Status: She is alert and oriented to person, place, and time.   Psychiatric:         Mood and Affect: Mood normal.         Behavior: Behavior normal.       Assessment & Plan:     52 y.o. female with the following -     Problem List Items Addressed This Visit       Thyroid nodule     Pt has upcoming appointment for biopsy 4/18/23 and follow up for results with OLIVIA Smalls 4/24/23         " Compression fracture of T12 vertebra with delayed healing     Follow up, surgery comlpeted 4/6/2023. Pt is recovering well. Denies pain to back today, reports she is eager to go back to work.          Other Visit Diagnoses       Encounter for completion of form with patient              Supportive care, differential diagnoses, and indications for immediate follow-up discussed with patient.    Pathogenesis of diagnosis discussed including typical length and natural progression.    Instructed to return to clinic or nearest emergency department for any change in condition, further concerns, or worsening of symptoms.  Patient states understanding of the plan of care and discharge instructions.    I have placed the below orders and discussed them with an approved delegating provider.  The MA is performing the below orders under the direction of Dr. Treviño.    I spent a total of 38 minutes with record review, exam, communication with the patient, communication with other providers, and documentation of this encounter.    Return if symptoms worsen or fail to improve.    Please note that this dictation was created using voice recognition software. I have made every reasonable attempt to correct obvious errors, but I expect that there are errors of grammar and possibly content that I did not discover before finalizing the note.

## 2023-04-17 NOTE — ASSESSMENT & PLAN NOTE
Pt has upcoming appointment for biopsy 4/18/23 and follow up for results with OLIVIA Smalls 4/24/23

## 2023-04-17 NOTE — ASSESSMENT & PLAN NOTE
Follow up, surgery comlpeted 4/6/2023. Pt is recovering well. Denies pain to back today, reports she is eager to go back to work.

## 2023-04-18 ENCOUNTER — HOSPITAL ENCOUNTER (OUTPATIENT)
Dept: RADIOLOGY | Facility: MEDICAL CENTER | Age: 53
End: 2023-04-18
Attending: NURSE PRACTITIONER
Payer: COMMERCIAL

## 2023-04-18 DIAGNOSIS — E04.1 THYROID NODULE: ICD-10-CM

## 2023-04-18 LAB — CYTOLOGY REG CYTOL: NORMAL

## 2023-04-18 PROCEDURE — 88173 CYTOPATH EVAL FNA REPORT: CPT

## 2023-04-18 PROCEDURE — 10006 FNA BX W/US GDN EA ADDL: CPT

## 2023-04-18 PROCEDURE — 88305 TISSUE EXAM BY PATHOLOGIST: CPT

## 2023-04-18 PROCEDURE — 700111 HCHG RX REV CODE 636 W/ 250 OVERRIDE (IP)

## 2023-04-18 RX ORDER — LIDOCAINE HYDROCHLORIDE 10 MG/ML
INJECTION, SOLUTION EPIDURAL; INFILTRATION; INTRACAUDAL; PERINEURAL
Status: COMPLETED
Start: 2023-04-18 | End: 2023-04-18

## 2023-04-18 RX ADMIN — LIDOCAINE HYDROCHLORIDE: 10 INJECTION, SOLUTION EPIDURAL; INFILTRATION; INTRACAUDAL; PERINEURAL at 09:00

## 2023-04-18 NOTE — PROGRESS NOTES
OPIR     Right Lower Thyroid Fine Needle Aspiration and Left Periparotid Nodule done by Dr. Glover, with no sedation. Local only. Right and left anterior aspect of neck access site; X2 jar of cytolyt and X1 Afirma obtained and sent to pathology.  Patient tolerated the procedure well.    All questions and concerns answered prior to being dc'd; pt provided with appropriate education for procedure, pt discharge to home.

## 2023-04-24 ENCOUNTER — HOSPITAL ENCOUNTER (OUTPATIENT)
Dept: HEMATOLOGY ONCOLOGY | Facility: MEDICAL CENTER | Age: 53
End: 2023-04-24
Attending: NURSE PRACTITIONER
Payer: COMMERCIAL

## 2023-04-24 VITALS
SYSTOLIC BLOOD PRESSURE: 143 MMHG | DIASTOLIC BLOOD PRESSURE: 83 MMHG | RESPIRATION RATE: 18 BRPM | HEART RATE: 72 BPM | OXYGEN SATURATION: 94 % | BODY MASS INDEX: 32.83 KG/M2 | HEIGHT: 68 IN | TEMPERATURE: 97.7 F | WEIGHT: 216.6 LBS

## 2023-04-24 DIAGNOSIS — R22.1 MASS OF LEFT SIDE OF NECK: ICD-10-CM

## 2023-04-24 DIAGNOSIS — R59.0 CERVICAL ADENOPATHY: ICD-10-CM

## 2023-04-24 DIAGNOSIS — R93.7 ABNORMAL MRI, THORACIC SPINE: ICD-10-CM

## 2023-04-24 DIAGNOSIS — S22.080A COMPRESSION FRACTURE OF T12 VERTEBRA, INITIAL ENCOUNTER (HCC): ICD-10-CM

## 2023-04-24 DIAGNOSIS — E04.1 THYROID NODULE: ICD-10-CM

## 2023-04-24 PROCEDURE — 99214 OFFICE O/P EST MOD 30 MIN: CPT | Performed by: NURSE PRACTITIONER

## 2023-04-24 PROCEDURE — 99212 OFFICE O/P EST SF 10 MIN: CPT | Performed by: NURSE PRACTITIONER

## 2023-04-24 ASSESSMENT — ENCOUNTER SYMPTOMS
BACK PAIN: 1
SORE THROAT: 0
CHILLS: 0
FEVER: 0

## 2023-04-24 ASSESSMENT — FIBROSIS 4 INDEX: FIB4 SCORE: 0.92

## 2023-04-24 ASSESSMENT — PAIN SCALES - GENERAL: PAINLEVEL: NO PAIN

## 2023-04-24 NOTE — Clinical Note
Kimmie - thyroid nodule negative. I will repeat US in 6 months. No need for endocrine at this time.  I am sending her to ENT for the lymph node as the biopsy was not definitive.  Luana
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
## 2023-04-27 ENCOUNTER — HOSPITAL ENCOUNTER (OUTPATIENT)
Dept: RADIOLOGY | Facility: MEDICAL CENTER | Age: 53
End: 2023-04-27
Payer: COMMERCIAL

## 2023-04-27 DIAGNOSIS — Z12.31 ENCOUNTER FOR SCREENING MAMMOGRAM FOR BREAST CANCER: ICD-10-CM

## 2023-04-27 PROCEDURE — 77063 BREAST TOMOSYNTHESIS BI: CPT

## 2023-05-02 ENCOUNTER — OFFICE VISIT (OUTPATIENT)
Dept: MEDICAL GROUP | Facility: PHYSICIAN GROUP | Age: 53
End: 2023-05-02
Payer: COMMERCIAL

## 2023-05-02 VITALS
HEART RATE: 83 BPM | OXYGEN SATURATION: 92 % | BODY MASS INDEX: 31.98 KG/M2 | HEIGHT: 68 IN | WEIGHT: 211 LBS | DIASTOLIC BLOOD PRESSURE: 74 MMHG | SYSTOLIC BLOOD PRESSURE: 110 MMHG | RESPIRATION RATE: 16 BRPM | TEMPERATURE: 96.9 F

## 2023-05-02 DIAGNOSIS — S22.080G COMPRESSION FRACTURE OF T12 VERTEBRA WITH DELAYED HEALING: ICD-10-CM

## 2023-05-02 DIAGNOSIS — M54.50 CHRONIC LEFT-SIDED LOW BACK PAIN WITHOUT SCIATICA: ICD-10-CM

## 2023-05-02 DIAGNOSIS — Z11.59 NEED FOR HEPATITIS C SCREENING TEST: ICD-10-CM

## 2023-05-02 DIAGNOSIS — G89.29 CHRONIC LEFT-SIDED LOW BACK PAIN WITHOUT SCIATICA: ICD-10-CM

## 2023-05-02 PROCEDURE — 99214 OFFICE O/P EST MOD 30 MIN: CPT

## 2023-05-02 RX ORDER — TIZANIDINE 4 MG/1
4 TABLET ORAL EVERY 6 HOURS PRN
Qty: 30 TABLET | Refills: 3 | Status: SHIPPED | OUTPATIENT
Start: 2023-05-02 | End: 2024-02-05 | Stop reason: SDUPTHER

## 2023-05-02 RX ORDER — MELOXICAM 7.5 MG/1
7.5 TABLET ORAL DAILY
Qty: 30 TABLET | Refills: 3 | Status: SHIPPED | OUTPATIENT
Start: 2023-05-02 | End: 2023-06-30 | Stop reason: SDUPTHER

## 2023-05-02 ASSESSMENT — ENCOUNTER SYMPTOMS
SENSORY CHANGE: 0
BACK PAIN: 1
WEAKNESS: 1

## 2023-05-02 ASSESSMENT — FIBROSIS 4 INDEX: FIB4 SCORE: 0.92
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
## 2023-05-02 NOTE — LETTER
[facility]  [address]  [place] [address]     May 2, 2023    Patient: Hortencia Bonilla   YOB: 1970   Date of Visit: 5/2/2023       To Whom It May Concern:    Hortencia Bonilla was seen and treated in our department on 5/2/2023.     Given Hortencia's spinal fracture, it is my recommendation she refrain from twisting, bending, sweeping, and pushing no more than 5 pounds as these activities exacerbate pain, may delay healing.   I appreciate your ability to accommodate Hortencia as she desires to work, and to be a valuable asset to the business.   Please do not hesitate to reach out to me for any questions or if further information is needed.    Sincerely,         JODI Palacios.
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
## 2023-05-02 NOTE — LETTER
[facility]  [address]  [place] [address]     May 2, 2023    Patient: Hortencai Bonilla   YOB: 1970   Date of Visit: 5/2/2023       To Whom It May Concern:    Hortencia Bonilla was seen and treated in our department on 5/2/2023.     Given Hortencia's spinal fracture, it is my recommendation she refrain from:  twisting, no wrapping pallets   bending,   Sweeping- push broom ok, small broom not advised as it requires twisting   Pushing no more than 15 pounds -no pallets  as these activities exacerbate pain, may delay healing.    Pulling is ok, but no more than 15 lbs.   Moving boxes and taping ok  Pushing pharmacy cart is ok,  Ok to pick medication    I appreciate your ability to accommodate Hortencia as she desires to work, and to be a valuable asset to the business.   Please do not hesitate to reach out to me for any questions or if further information is needed.    Sincerely,     JODI Palacios.

## 2023-05-23 ENCOUNTER — OFFICE VISIT (OUTPATIENT)
Dept: MEDICAL GROUP | Facility: PHYSICIAN GROUP | Age: 53
End: 2023-05-23
Payer: COMMERCIAL

## 2023-05-23 ENCOUNTER — HOSPITAL ENCOUNTER (OUTPATIENT)
Facility: MEDICAL CENTER | Age: 53
End: 2023-05-23
Attending: STUDENT IN AN ORGANIZED HEALTH CARE EDUCATION/TRAINING PROGRAM
Payer: COMMERCIAL

## 2023-05-23 VITALS
SYSTOLIC BLOOD PRESSURE: 108 MMHG | DIASTOLIC BLOOD PRESSURE: 62 MMHG | BODY MASS INDEX: 32.13 KG/M2 | RESPIRATION RATE: 20 BRPM | TEMPERATURE: 96.5 F | HEART RATE: 81 BPM | WEIGHT: 212 LBS | OXYGEN SATURATION: 95 % | HEIGHT: 68 IN

## 2023-05-23 DIAGNOSIS — N30.01 ACUTE CYSTITIS WITH HEMATURIA: ICD-10-CM

## 2023-05-23 LAB
APPEARANCE UR: CLEAR
BILIRUB UR STRIP-MCNC: NEGATIVE MG/DL
COLOR UR AUTO: YELLOW
GLUCOSE UR STRIP.AUTO-MCNC: NEGATIVE MG/DL
KETONES UR STRIP.AUTO-MCNC: NEGATIVE MG/DL
LEUKOCYTE ESTERASE UR QL STRIP.AUTO: NORMAL
NITRITE UR QL STRIP.AUTO: NEGATIVE
PH UR STRIP.AUTO: 5.5 [PH] (ref 5–8)
PROT UR QL STRIP: NORMAL MG/DL
RBC UR QL AUTO: NEGATIVE
SP GR UR STRIP.AUTO: 1.02
UROBILINOGEN UR STRIP-MCNC: 0.2 MG/DL

## 2023-05-23 PROCEDURE — 81002 URINALYSIS NONAUTO W/O SCOPE: CPT | Performed by: STUDENT IN AN ORGANIZED HEALTH CARE EDUCATION/TRAINING PROGRAM

## 2023-05-23 PROCEDURE — 99213 OFFICE O/P EST LOW 20 MIN: CPT | Performed by: STUDENT IN AN ORGANIZED HEALTH CARE EDUCATION/TRAINING PROGRAM

## 2023-05-23 PROCEDURE — 87086 URINE CULTURE/COLONY COUNT: CPT

## 2023-05-23 PROCEDURE — 3078F DIAST BP <80 MM HG: CPT | Performed by: STUDENT IN AN ORGANIZED HEALTH CARE EDUCATION/TRAINING PROGRAM

## 2023-05-23 PROCEDURE — 3074F SYST BP LT 130 MM HG: CPT | Performed by: STUDENT IN AN ORGANIZED HEALTH CARE EDUCATION/TRAINING PROGRAM

## 2023-05-23 RX ORDER — SULFAMETHOXAZOLE AND TRIMETHOPRIM 800; 160 MG/1; MG/1
1 TABLET ORAL EVERY 12 HOURS
Qty: 6 TABLET | Refills: 0 | Status: SHIPPED | OUTPATIENT
Start: 2023-05-23 | End: 2023-05-26

## 2023-05-23 ASSESSMENT — ENCOUNTER SYMPTOMS
CONSTIPATION: 1
ABDOMINAL PAIN: 1
VOMITING: 0
DIARRHEA: 0
WEIGHT LOSS: 0
SHORTNESS OF BREATH: 0
COUGH: 0
NAUSEA: 0
PALPITATIONS: 0
CHILLS: 0
FLANK PAIN: 0
FEVER: 0

## 2023-05-23 ASSESSMENT — FIBROSIS 4 INDEX: FIB4 SCORE: 0.92

## 2023-05-23 NOTE — PROGRESS NOTES
"Subjective:     Chief Complaint   Patient presents with    UTI     Frequency, urgency, fullness, pain at the end with urination; 3x wks      HPI:   Hortencia presents today with urinary symptoms x 2.5 weeks. PCP not available.      utilized during this visit.    Patient reports 2.5 weeks of burning pain with voiding and after urination pain her bladder. Endorses new frequency and urgency. Has come constipation (acute on chronic), trying to drink more water. Had one episode of hematuria, spot with wiping. Denies vaginal discharge, not sexually active. Has a history of UTI, not in years.    Review of Systems   Constitutional:  Negative for chills, fever, malaise/fatigue and weight loss.   Respiratory:  Negative for cough and shortness of breath.    Cardiovascular:  Negative for chest pain, palpitations and leg swelling.   Gastrointestinal:  Positive for abdominal pain and constipation. Negative for diarrhea, nausea and vomiting.   Genitourinary:  Positive for dysuria, frequency, hematuria and urgency. Negative for flank pain.     Objective:     Exam:  /62 (BP Location: Right arm, Patient Position: Sitting, BP Cuff Size: Adult)   Pulse 81   Temp 35.8 °C (96.5 °F) (Temporal)   Resp 20   Ht 1.727 m (5' 8\")   Wt 96.2 kg (212 lb)   LMP 11/27/2012   SpO2 95%   BMI 32.23 kg/m²  Body mass index is 32.23 kg/m².    Physical Exam  Vitals reviewed.   Constitutional:       General: She is not in acute distress.     Appearance: Normal appearance. She is obese. She is not ill-appearing.   HENT:      Head: Normocephalic and atraumatic.      Mouth/Throat:      Mouth: Mucous membranes are moist.   Eyes:      General: No scleral icterus.  Cardiovascular:      Rate and Rhythm: Normal rate and regular rhythm.      Heart sounds: Normal heart sounds.   Pulmonary:      Effort: Pulmonary effort is normal.      Breath sounds: Normal breath sounds.   Abdominal:      General: Abdomen is flat. Bowel sounds " are normal. There is no distension.      Palpations: Abdomen is soft. There is no mass.      Tenderness: There is abdominal tenderness. There is no right CVA tenderness, left CVA tenderness, guarding or rebound.      Comments: Mild suprapubic ttp   Skin:     General: Skin is warm and dry.      Coloration: Skin is not jaundiced.   Neurological:      General: No focal deficit present.      Mental Status: She is alert and oriented to person, place, and time.   Psychiatric:         Mood and Affect: Mood normal.         Behavior: Behavior normal.         Thought Content: Thought content normal.       Labs: Reviewed from 4/6/2023    Assessment & Plan:     52 y.o. female with the following -     1. Acute cystitis with hematuria  This is an acute condition, treat with Bactrim as below.  Discussed prevention.  Will send for urine culture.  Gave return precautions.  - POCT Urinalysis  - URINE CULTURE(NEW); Future  - sulfamethoxazole-trimethoprim (BACTRIM DS) 800-160 MG tablet; Take 1 Tablet by mouth every 12 hours for 3 days.  Dispense: 6 Tablet; Refill: 0    Return if symptoms worsen or fail to improve.    Please note that this dictation was created using voice recognition software. I have made every reasonable attempt to correct obvious errors, but I expect that there are errors of grammar and possibly content that I did not discover before finalizing the note.

## 2023-05-25 LAB
BACTERIA UR CULT: NORMAL
SIGNIFICANT IND 70042: NORMAL
SITE SITE: NORMAL
SOURCE SOURCE: NORMAL

## 2023-06-07 ENCOUNTER — OFFICE VISIT (OUTPATIENT)
Dept: MEDICAL GROUP | Facility: PHYSICIAN GROUP | Age: 53
End: 2023-06-07
Payer: COMMERCIAL

## 2023-06-07 ENCOUNTER — HOSPITAL ENCOUNTER (OUTPATIENT)
Facility: MEDICAL CENTER | Age: 53
End: 2023-06-07
Payer: COMMERCIAL

## 2023-06-07 VITALS
TEMPERATURE: 97.4 F | OXYGEN SATURATION: 98 % | BODY MASS INDEX: 32.13 KG/M2 | HEART RATE: 75 BPM | SYSTOLIC BLOOD PRESSURE: 110 MMHG | WEIGHT: 212 LBS | RESPIRATION RATE: 16 BRPM | DIASTOLIC BLOOD PRESSURE: 70 MMHG | HEIGHT: 68 IN

## 2023-06-07 DIAGNOSIS — B35.1 ONYCHOMYCOSIS: ICD-10-CM

## 2023-06-07 DIAGNOSIS — M54.6 CHRONIC BILATERAL THORACIC BACK PAIN: ICD-10-CM

## 2023-06-07 DIAGNOSIS — G89.29 CHRONIC BILATERAL THORACIC BACK PAIN: ICD-10-CM

## 2023-06-07 DIAGNOSIS — R30.9 PAINFUL URINATION: ICD-10-CM

## 2023-06-07 DIAGNOSIS — N30.01 ACUTE CYSTITIS WITH HEMATURIA: ICD-10-CM

## 2023-06-07 PROBLEM — M54.9 ACUTE LEFT-SIDED BACK PAIN: Status: RESOLVED | Noted: 2023-01-17 | Resolved: 2023-06-07

## 2023-06-07 LAB
APPEARANCE UR: CLEAR
BILIRUB UR STRIP-MCNC: NORMAL MG/DL
COLOR UR AUTO: YELLOW
GLUCOSE UR STRIP.AUTO-MCNC: NORMAL MG/DL
KETONES UR STRIP.AUTO-MCNC: NORMAL MG/DL
LEUKOCYTE ESTERASE UR QL STRIP.AUTO: NORMAL
NITRITE UR QL STRIP.AUTO: NORMAL
PH UR STRIP.AUTO: 6 [PH] (ref 5–8)
PROT UR QL STRIP: NORMAL MG/DL
RBC UR QL AUTO: NORMAL
SP GR UR STRIP.AUTO: 1.03
UROBILINOGEN UR STRIP-MCNC: 0.2 MG/DL

## 2023-06-07 PROCEDURE — 81002 URINALYSIS NONAUTO W/O SCOPE: CPT

## 2023-06-07 PROCEDURE — 99214 OFFICE O/P EST MOD 30 MIN: CPT

## 2023-06-07 PROCEDURE — 3074F SYST BP LT 130 MM HG: CPT

## 2023-06-07 PROCEDURE — 87086 URINE CULTURE/COLONY COUNT: CPT

## 2023-06-07 PROCEDURE — 3078F DIAST BP <80 MM HG: CPT

## 2023-06-07 PROCEDURE — 87077 CULTURE AEROBIC IDENTIFY: CPT

## 2023-06-07 RX ORDER — PHENAZOPYRIDINE HYDROCHLORIDE 200 MG/1
200 TABLET, FILM COATED ORAL 3 TIMES DAILY
Qty: 6 TABLET | Refills: 0 | Status: SHIPPED | OUTPATIENT
Start: 2023-06-07 | End: 2023-06-09

## 2023-06-07 RX ORDER — NITROFURANTOIN 25; 75 MG/1; MG/1
100 CAPSULE ORAL EVERY 12 HOURS
Qty: 10 CAPSULE | Refills: 0 | Status: SHIPPED | OUTPATIENT
Start: 2023-06-07 | End: 2023-06-12

## 2023-06-07 ASSESSMENT — FIBROSIS 4 INDEX: FIB4 SCORE: 0.92

## 2023-06-07 NOTE — ASSESSMENT & PLAN NOTE
Chronic, unstable. To multiple toenails bilaterally. Discussed treatment options. She would like to try some home remedies, and consider terbinafine treatment in the future.

## 2023-06-07 NOTE — PATIENT INSTRUCTIONS
Infección urinaria en los adultos  Urinary Tract Infection, Adult  Jose D infección urinaria (IU) puede ocurrir en cualquier lugar de las vías urinarias. Las vías urinarias incluyen lo siguiente:  Los riñones.  Los uréteres.  La vejiga.  La uretra.  Estos órganos fabrican, almacenan y eliminan el pis (orina) del cuerpo.  ¿Cuáles son las causas?  La causa es la presencia de gérmenes (bacterias) en la shanelle genital. Estos gérmenes proliferan y causan hinchazón (inflamación) de las vías urinarias.  ¿Qué incrementa el riesgo?  Es más probable que contraiga esta afección si:  Tiene colocado un tubo hoffman y pequeño (catéter) para drenar el pis.  No puede controlar la evacuación de pis ni de materia fecal (incontinencia).  Es bety y, además:  Usa estos métodos para evitar el embarazo:  Un medicamento que christianson los espermatozoides (espermicida).  Un dispositivo que impide el paso de los espermatozoides (diafragma).  Tiene niveles bajos de jose d hormona femenina (estrógeno).  Está embarazada.  Tiene genes que aumentan santana riesgo.  Es sexualmente activa.  Martine antibióticos.  Tiene dificultad para orinar debido a:  Santana próstata es más denise de lo normal, si usted es hombre.  Obstrucción en la parte del cuerpo que drena el pis de la vejiga (uretra).  Cálculo renal.  Un trastorno nervioso que afecta la vejiga (vejiga neurógena).  No kera jose d cantidad suficiente de líquido.  No hace pis con la frecuencia suficiente.  Tiene otras afecciones, celeste:  Diabetes.  Un sistema que combate las enfermedades (sistema inmunitario) debilitado.  Anemia drepanocítica.  Gota.  Lesión en la columna vertebral.  ¿Cuáles son los signos o los síntomas?  Los síntomas de esta afección incluyen:  Necesidad inmediata (urgente) de hacer pis.  Hacer pis con frecuencia.  Hacer poca cantidad de pis con mucha frecuencia.  Dolor o ardor al hacer pis.  Alan en el pis.  Pis que huele mal o anormal.  Dificultad para hacer pis.  Pis turbio.  Líquido que sale de la  vagina, si es bety.  Dolor en la yovani o en la parte baja de la espalda.  Otros síntomas pueden incluir los siguientes:  Vómitos.  No sentir deseos de comer.  Sentirse confundido (confuso).  Sentirse cansado y malhumorado (irritable).  Fiebre.  Materia fecal líquida (diarrea).  ¿Cómo se trata?  El tratamiento de esta afección puede incluir:  Antibióticos.  Otros medicamentos.  Beber amberly cantidad suficiente de agua.  Sigue estas instrucciones en tu casa:    Medicamentos  [place] los medicamentos de venta chidi y los recetados solamente celeste se lo haya indicado el médico.  Si le recetaron un antibiótico, tómelo celeste se lo haya indicado el médico. No deje de tomarlo aunque comience a sentirse mejor.  Indicaciones generales  Asegúrese de hacer lo siguiente:  Yusef pis hasta que la vejiga quede vacía.  No contenga el pis yovanny mucho tiempo.  Vacíe la vejiga después de tener relaciones sexuales.  Límpiese de adelante hacia atrás después de defecar, si es bety. Use cada trozo de papel higiénico solo amberly vez cuando se limpie.  Isidra suficiente líquido celeste para mantener la orina de color amarillo pálido.  Concurra a todas las visitas de seguimiento celeste se lo haya indicado el médico. Loami es importante.  Comuníquese con un médico si:  No mejora después de 1 o 2 días de tratamiento.  Los síntomas desaparecen y luego reaparecen.  Solicite ayuda inmediatamente si:  Tiene un dolor muy intenso en la espalda.  Tiene dolor muy intenso en la parte baja de la yovani.  Tener fiebre.  Tiene malestar estomacal (náuseas).  Tiene vómitos.  Resumen  Amberly infección urinaria (IU) puede ocurrir en cualquier lugar de las vías urinarias.  Esta afección es causada por la presencia de gérmenes en la shanelle genital.  Existen muchos factores de riesgo de sufrir amberly IU. Estos incluyen tener colocado un tubo hoffman y pequeño para drenar el pis y no poder controlar cuándo hace pis y materia fecal.  El tratamiento incluye antibióticos contra los  gérmenes.  Isidra suficiente líquido celeste para mantener la orina de color amarillo pálido.  Esta información no tiene celeste fin reemplazar el consejo del médico. Asegúrese de hacerle al médico cualquier pregunta que tenga.  Document Released: 06/07/2011 Document Revised: 12/11/2019 Document Reviewed: 12/11/2019  Elsevier Patient Education © 2020 [employer]

## 2023-06-07 NOTE — PROGRESS NOTES
"Chief Complaint   Patient presents with    Painful Urination     X 1 month, went to  medication did not help     Nail Problem     Foot fungus     [place]ian interpretation with Language Line used for this visit.    HPI:  Symptom onset: 30 days ago   Current symptoms: Painful, urgent, frequent voids. Noticed blood in urine one time.   Since onset symptoms are: Unchanged, was seen by Dr. Lion for this, took antibiotics for this as prescribed without symptom improvement.   Treatments tried: antibiotics, cranberry juice  Associated symptoms: Negative for fever, nausea and vomiting, vaginal discharge. Positive for pelvic pain, abdominal warmth, back pain, heaviness and burning with urination.  History is negative for frequent UTI.     ROS:  Denies fever, chills, vomiting or abdominal pain.     OBJECTIVE:  /70 (BP Location: Right arm, Patient Position: Sitting, BP Cuff Size: Small adult)   Pulse 75   Temp 36.3 °C (97.4 °F) (Temporal)   Resp 16   Ht 1.727 m (5' 8\")   Wt 96.2 kg (212 lb)   SpO2 98%   Gen: Alert, NAD.  Chest: Lungs clear to auscultation, CV RRR.  Abdomen: Soft, tender in suprapubic region. No CVAT. Normal bowel sounds.     Lab Results   Component Value Date    POCCOLOR Yellow 05/23/2023    POCAPPEAR Clear 05/23/2023    POCLEUKEST Trace 05/23/2023    POCNITRITE Negative 05/23/2023    POCUROBILIGE 0.2 05/23/2023    POCPROTEIN Trace 05/23/2023    POCURPH 5.5 05/23/2023    POCBLOOD Negative 05/23/2023    POCSPGRV 1.025 05/23/2023    POCKETONES Negative 05/23/2023    POCBILIRUBIN Negative 05/23/2023    POCGLUCUA Negative 05/23/2023          ASSESSMENT/PLAN:     1. Painful urination    2. Acute cystitis with hematuria       Problem List Items Addressed This Visit       Chronic bilateral thoracic back pain     Chronic, gradually improving.          Onychomycosis     Chronic, unstable. To multiple toenails bilaterally. Discussed treatment options. She would like to try some home remedies, and consider " terbinafine treatment in the future.           Other Visit Diagnoses       Painful urination        Relevant Medications    nitrofurantoin (MACROBID) 100 MG Cap    phenazopyridine (PYRIDIUM) 200 MG Tab    Other Relevant Orders    POCT Urinalysis    URINE CULTURE(NEW)    Acute cystitis with hematuria        Relevant Medications    nitrofurantoin (MACROBID) 100 MG Cap    phenazopyridine (PYRIDIUM) 200 MG Tab    Other Relevant Orders    URINE CULTURE(NEW)           1. Abnormal urine dipstick in office. Urine sent for culture. Start antibiotics. If no improvement send to urology.  2. Provided education to drink plenty of fluids, wipe front to back every void and bowel movement.   3. Return to clinic if symptoms not improving within 3-4 days or in case of vomiting, fever, increasing pain.

## 2023-06-08 DIAGNOSIS — R30.9 PAINFUL URINATION: ICD-10-CM

## 2023-06-08 DIAGNOSIS — N30.01 ACUTE CYSTITIS WITH HEMATURIA: ICD-10-CM

## 2023-06-11 LAB
BACTERIA UR CULT: NORMAL
SIGNIFICANT IND 70042: NORMAL
SITE SITE: NORMAL
SOURCE SOURCE: NORMAL

## 2023-06-23 ENCOUNTER — HOSPITAL ENCOUNTER (OUTPATIENT)
Facility: MEDICAL CENTER | Age: 53
End: 2023-06-23
Attending: NURSE PRACTITIONER
Payer: COMMERCIAL

## 2023-06-23 ENCOUNTER — OFFICE VISIT (OUTPATIENT)
Dept: URGENT CARE | Facility: PHYSICIAN GROUP | Age: 53
End: 2023-06-23
Payer: COMMERCIAL

## 2023-06-23 VITALS
BODY MASS INDEX: 32.61 KG/M2 | DIASTOLIC BLOOD PRESSURE: 58 MMHG | TEMPERATURE: 97.2 F | RESPIRATION RATE: 16 BRPM | HEART RATE: 74 BPM | HEIGHT: 68 IN | OXYGEN SATURATION: 96 % | SYSTOLIC BLOOD PRESSURE: 108 MMHG | WEIGHT: 215.2 LBS

## 2023-06-23 DIAGNOSIS — N89.8 VAGINAL IRRITATION: ICD-10-CM

## 2023-06-23 DIAGNOSIS — R30.9 PAINFUL URINATION: ICD-10-CM

## 2023-06-23 LAB
APPEARANCE UR: CLEAR
BILIRUB UR STRIP-MCNC: NORMAL MG/DL
COLOR UR AUTO: YELLOW
GLUCOSE UR STRIP.AUTO-MCNC: NORMAL MG/DL
KETONES UR STRIP.AUTO-MCNC: NORMAL MG/DL
LEUKOCYTE ESTERASE UR QL STRIP.AUTO: NORMAL
NITRITE UR QL STRIP.AUTO: NORMAL
PH UR STRIP.AUTO: 5 [PH] (ref 5–8)
PROT UR QL STRIP: NORMAL MG/DL
RBC UR QL AUTO: NORMAL
SP GR UR STRIP.AUTO: 1.01
UROBILINOGEN UR STRIP-MCNC: 0.2 MG/DL

## 2023-06-23 PROCEDURE — 3078F DIAST BP <80 MM HG: CPT | Performed by: NURSE PRACTITIONER

## 2023-06-23 PROCEDURE — 87491 CHLMYD TRACH DNA AMP PROBE: CPT

## 2023-06-23 PROCEDURE — 99214 OFFICE O/P EST MOD 30 MIN: CPT | Performed by: NURSE PRACTITIONER

## 2023-06-23 PROCEDURE — 87591 N.GONORRHOEAE DNA AMP PROB: CPT

## 2023-06-23 PROCEDURE — 87660 TRICHOMONAS VAGIN DIR PROBE: CPT

## 2023-06-23 PROCEDURE — 87480 CANDIDA DNA DIR PROBE: CPT

## 2023-06-23 PROCEDURE — 87510 GARDNER VAG DNA DIR PROBE: CPT

## 2023-06-23 PROCEDURE — 87086 URINE CULTURE/COLONY COUNT: CPT

## 2023-06-23 PROCEDURE — 81002 URINALYSIS NONAUTO W/O SCOPE: CPT | Performed by: NURSE PRACTITIONER

## 2023-06-23 PROCEDURE — 3074F SYST BP LT 130 MM HG: CPT | Performed by: NURSE PRACTITIONER

## 2023-06-23 ASSESSMENT — ENCOUNTER SYMPTOMS
VOMITING: 0
FEVER: 0
ABDOMINAL PAIN: 1
CHILLS: 0
DIARRHEA: 0
NAUSEA: 0
FLANK PAIN: 0

## 2023-06-23 ASSESSMENT — FIBROSIS 4 INDEX: FIB4 SCORE: 0.92

## 2023-06-24 DIAGNOSIS — N89.8 VAGINAL IRRITATION: ICD-10-CM

## 2023-06-24 DIAGNOSIS — R30.9 PAINFUL URINATION: ICD-10-CM

## 2023-06-24 NOTE — PROGRESS NOTES
Subjective:     Hortencia Bonilla is a 52 y.o. female who presents for Painful Urination (Xsince May 10th, was given medication by previous doctor when seen for this and then she had vaginal itching, has been prescribed 2 times from 2 different doctors and medication did not work  ), Urinary Frequency (Xsince May 10th, feels sensation like her bladder is full ), and Vaginal Itching (Xsince may 10th)      Urinary Frequency  Associated symptoms include abdominal pain. Pertinent negatives include no chills, fever, nausea or vomiting.   Vaginal Itching  Associated symptoms include abdominal pain. Pertinent negatives include no chills, fever, nausea or vomiting.     Pt presents for evaluation of a new problem. Hortencia is a very pleasant [place] African-speaking female who presents urgent care today with complaints of painful urination that has been ongoing since May 10/2023.  She has been seen twice in primary care and diagnosed with UTIs.  Both times her urine cultures were negative and she received no relief from the antibiotics.  She does complain of vaginal itching but denies any vaginal discharge and is postmenopausal.  She does complain of painful intercourse due to vaginal dryness.  She denies any urinary odor/vaginal odor but does endorse dysuria and urinary frequency.  She also complains of pelvic pressure.     Review of Systems   Constitutional:  Negative for chills and fever.   Gastrointestinal:  Positive for abdominal pain. Negative for diarrhea, nausea and vomiting.   Genitourinary:  Positive for dysuria and frequency. Negative for flank pain, hematuria and urgency.       PMH:   Past Medical History:   Diagnosis Date    Bipolar disorder (HCC)     Thyroid nodule      ALLERGIES:   Allergies   Allergen Reactions    Bloodless      SURGHX:   Past Surgical History:   Procedure Laterality Date    PRIMARY C SECTION      x 1     SOCHX:   Social History     Socioeconomic History    Marital status:    Tobacco Use    Smoking  "status: Never    Smokeless tobacco: Never   Vaping Use    Vaping Use: Never used   Substance and Sexual Activity    Alcohol use: No    Drug use: No    Sexual activity: Yes     Partners: Male     FH:   Family History   Problem Relation Age of Onset    Colorectal Cancer Mother     Esophageal Cancer Mother     Other Brother         OCD ([place]) half brother    Cancer Maternal Aunt         unsure    Cancer Maternal Grandmother         unsure of type    Drug abuse Daughter     Psychiatric Illness Daughter     Diabetes Neg Hx     Hyperlipidemia Neg Hx     Hypertension Neg Hx     Heart Disease Neg Hx     Stroke Neg Hx          Objective:   /58 (BP Location: Left arm, Patient Position: Sitting, BP Cuff Size: Adult)   Pulse 74   Temp 36.2 °C (97.2 °F) (Temporal)   Resp 16   Ht 1.727 m (5' 8\")   Wt 97.6 kg (215 lb 3.2 oz)   LMP 11/27/2012   SpO2 96%   BMI 32.72 kg/m²     Physical Exam  Vitals and nursing note reviewed.   Constitutional:       General: She is not in acute distress.     Appearance: Normal appearance. She is not ill-appearing.   HENT:      Head: Normocephalic and atraumatic.      Right Ear: External ear normal.      Left Ear: External ear normal.      Nose: No congestion or rhinorrhea.      Mouth/Throat:      Mouth: Mucous membranes are moist.   Eyes:      Extraocular Movements: Extraocular movements intact.      Pupils: Pupils are equal, round, and reactive to light.   Cardiovascular:      Rate and Rhythm: Normal rate and regular rhythm.      Pulses: Normal pulses.      Heart sounds: Normal heart sounds.   Pulmonary:      Effort: Pulmonary effort is normal.      Breath sounds: Normal breath sounds.   Abdominal:      General: Abdomen is flat. Bowel sounds are normal.      Palpations: Abdomen is soft.      Tenderness: There is abdominal tenderness in the suprapubic area. There is no right CVA tenderness or left CVA tenderness.       Musculoskeletal:         General: Normal range of motion.      " Cervical back: Normal range of motion and neck supple.   Skin:     General: Skin is warm and dry.      Capillary Refill: Capillary refill takes less than 2 seconds.   Neurological:      General: No focal deficit present.      Mental Status: She is alert and oriented to person, place, and time. Mental status is at baseline.   Psychiatric:         Mood and Affect: Mood normal.         Behavior: Behavior normal.         Thought Content: Thought content normal.         Judgment: Judgment normal.         Assessment/Plan:   Assessment    1. Painful urination  POCT Urinalysis    URINE CULTURE(NEW)      2. Vaginal irritation  VAGINAL PATHOGENS DNA PANEL    Chlamydia/GC, PCR (Genital/Anal swab)        Differential diagnoses discussed with patient.  She was tested for gonorrhea/chlamydia, BV, yeast and trichomonas.  Unlikely that she is suffering from UTI however, due to dysuria her urine was sent for culture.  I will notify her of results.  Treatment is pending results.  An  was used for the duration of her visit today.  Questions and concerns addressed.  She is in agreement with her plan of care today.  Time spent evaluating this patient was at least 34 minutes and includes preparing for visit, counseling/education, exam and evaluation, obtaining history, independent interpretation and ordering labs/tests/procedures.

## 2023-06-25 LAB
C TRACH DNA GENITAL QL NAA+PROBE: NEGATIVE
CANDIDA DNA VAG QL PROBE+SIG AMP: NEGATIVE
G VAGINALIS DNA VAG QL PROBE+SIG AMP: POSITIVE
N GONORRHOEA DNA GENITAL QL NAA+PROBE: NEGATIVE
SPECIMEN SOURCE: NORMAL
T VAGINALIS DNA VAG QL PROBE+SIG AMP: NEGATIVE

## 2023-06-26 ENCOUNTER — TELEPHONE (OUTPATIENT)
Dept: URGENT CARE | Facility: CLINIC | Age: 53
End: 2023-06-26
Payer: COMMERCIAL

## 2023-06-26 DIAGNOSIS — N76.0 BACTERIAL VAGINITIS: ICD-10-CM

## 2023-06-26 DIAGNOSIS — B96.89 BACTERIAL VAGINITIS: ICD-10-CM

## 2023-06-26 LAB
BACTERIA UR CULT: NORMAL
SIGNIFICANT IND 70042: NORMAL
SITE SITE: NORMAL
SOURCE SOURCE: NORMAL

## 2023-06-26 RX ORDER — METRONIDAZOLE 500 MG/1
500 TABLET ORAL 2 TIMES DAILY
Qty: 14 TABLET | Refills: 0 | Status: SHIPPED | OUTPATIENT
Start: 2023-06-26 | End: 2023-07-03

## 2023-06-27 NOTE — TELEPHONE ENCOUNTER
Called pt to let her know we sent in Antibiotics for her. Also advised to not drink with Alcohol pt understood and had no further questions.

## 2023-06-30 ENCOUNTER — OFFICE VISIT (OUTPATIENT)
Dept: MEDICAL GROUP | Facility: PHYSICIAN GROUP | Age: 53
End: 2023-06-30
Payer: COMMERCIAL

## 2023-06-30 VITALS
TEMPERATURE: 97.2 F | HEIGHT: 68 IN | OXYGEN SATURATION: 96 % | HEART RATE: 77 BPM | SYSTOLIC BLOOD PRESSURE: 114 MMHG | BODY MASS INDEX: 32.58 KG/M2 | DIASTOLIC BLOOD PRESSURE: 80 MMHG | WEIGHT: 215 LBS

## 2023-06-30 DIAGNOSIS — M54.50 CHRONIC LEFT-SIDED LOW BACK PAIN WITHOUT SCIATICA: ICD-10-CM

## 2023-06-30 DIAGNOSIS — M80.00XD AGE-RELATED OSTEOPOROSIS WITH CURRENT PATHOLOGICAL FRACTURE WITH ROUTINE HEALING, SUBSEQUENT ENCOUNTER: ICD-10-CM

## 2023-06-30 DIAGNOSIS — M47.816 OSTEOARTHRITIS OF LUMBAR SPINE, UNSPECIFIED SPINAL OSTEOARTHRITIS COMPLICATION STATUS: ICD-10-CM

## 2023-06-30 DIAGNOSIS — G89.29 CHRONIC LEFT-SIDED LOW BACK PAIN WITHOUT SCIATICA: ICD-10-CM

## 2023-06-30 PROCEDURE — 3074F SYST BP LT 130 MM HG: CPT

## 2023-06-30 PROCEDURE — 99214 OFFICE O/P EST MOD 30 MIN: CPT

## 2023-06-30 PROCEDURE — 3079F DIAST BP 80-89 MM HG: CPT

## 2023-06-30 RX ORDER — MELOXICAM 15 MG/1
15 TABLET ORAL DAILY
Qty: 90 TABLET | Refills: 1 | Status: SHIPPED | OUTPATIENT
Start: 2023-06-30 | End: 2023-12-26

## 2023-06-30 ASSESSMENT — FIBROSIS 4 INDEX: FIB4 SCORE: 0.92

## 2023-06-30 ASSESSMENT — ENCOUNTER SYMPTOMS: BACK PAIN: 1

## 2023-06-30 NOTE — ASSESSMENT & PLAN NOTE
Chronic, ongoing. Having difficulty working with pain, feels her employer is not able to accommodate <15 lb weight lifting restrictions, and she is frequently lifting >18 lbs with pain and discomfort, additionally bending, pulling, stocking is painful, even with abdominal binder. She is asking about disability, and accommodations. Her back pain and arthritis has limited her ability for several years, and with fracture this has limited her ability to perform at work.  Continue meloxicam, will increase to 15 mg daily.   Ref pt  Ref pain management.

## 2023-06-30 NOTE — LETTER
June 30, 2023    To Whom It May Concern:         This is confirmation that Hortencia Bonilla attended her scheduled appointment with ELIJAH Palacios on 6/30/23.    It is my recommendation, based on my knowledge of Hortencia and her ability to work after having an injury and exacerbating her back pain, for her to lift up to 18 lbs with girdle in place. Lifting any more than this may worsen her condition.   Thank you for your consideration.         If you have any questions please do not hesitate to call me at the phone number listed below.    Sincerely,          JODI Palacios.  [phone]

## 2023-07-05 ENCOUNTER — OFFICE VISIT (OUTPATIENT)
Dept: MEDICAL GROUP | Facility: PHYSICIAN GROUP | Age: 53
End: 2023-07-05
Payer: COMMERCIAL

## 2023-07-05 VITALS
WEIGHT: 215 LBS | HEIGHT: 68 IN | HEART RATE: 75 BPM | OXYGEN SATURATION: 97 % | TEMPERATURE: 98.3 F | BODY MASS INDEX: 32.58 KG/M2

## 2023-07-05 DIAGNOSIS — G89.29 CHRONIC LEFT-SIDED LOW BACK PAIN WITHOUT SCIATICA: ICD-10-CM

## 2023-07-05 DIAGNOSIS — M54.2 CERVICAL PAIN (NECK): ICD-10-CM

## 2023-07-05 DIAGNOSIS — M54.50 CHRONIC LEFT-SIDED LOW BACK PAIN WITHOUT SCIATICA: ICD-10-CM

## 2023-07-05 DIAGNOSIS — M47.816 OSTEOARTHRITIS OF LUMBAR SPINE, UNSPECIFIED SPINAL OSTEOARTHRITIS COMPLICATION STATUS: ICD-10-CM

## 2023-07-05 DIAGNOSIS — G89.29 CHRONIC PAIN OF BOTH KNEES: ICD-10-CM

## 2023-07-05 DIAGNOSIS — M25.561 CHRONIC PAIN OF BOTH KNEES: ICD-10-CM

## 2023-07-05 DIAGNOSIS — M25.562 CHRONIC PAIN OF BOTH KNEES: ICD-10-CM

## 2023-07-05 PROCEDURE — 99213 OFFICE O/P EST LOW 20 MIN: CPT

## 2023-07-05 ASSESSMENT — FIBROSIS 4 INDEX: FIB4 SCORE: 0.92

## 2023-07-05 ASSESSMENT — ENCOUNTER SYMPTOMS: BACK PAIN: 1

## 2023-07-05 NOTE — ASSESSMENT & PLAN NOTE
Chronic, unstable. Reports worsening and increasing pain to bilateral knees secondary to arthritis.

## 2023-07-05 NOTE — ASSESSMENT & PLAN NOTE
Chronic, unstable. Pt reports pain from her cervical spine causing occasional n/t to fingertips to left upper extremity.

## 2023-07-05 NOTE — ASSESSMENT & PLAN NOTE
Chronic, patient reports pain is severe to her back, slight improvement with meloxicam 15 mg daily, but temporary.   Continue meloxicam 15 mg daily

## 2023-07-05 NOTE — PROGRESS NOTES
"Subjective:     CC: Diagnoses of Osteoarthritis of lumbar spine, unspecified spinal osteoarthritis complication status, Cervical pain (neck), Chronic pain of both knees, and Chronic left-sided low back pain without sciatica were pertinent to this visit.     Pt presents today by her self.  used for this visit via language line.    She presents requesting a letter to provide to disability, requesting consideration for permanent disability given her spine pain.     HPI:   Hortencia presents today with    Problem   Cervical Pain (Neck)   Chronic Pain of Both Knees   Chronic Left-Sided Low Back Pain Without Sciatica   Degenerative Joint Disease (Djd) of Lumbar Spine     ROS:  Review of Systems   Musculoskeletal:  Positive for back pain and joint pain.   All other systems reviewed and are negative.      Objective:     Exam:  Pulse 75   Temp 36.8 °C (98.3 °F) (Temporal)   Ht 1.727 m (5' 8\")   Wt 97.5 kg (215 lb)   LMP 11/27/2012   SpO2 97%   BMI 32.69 kg/m²  Body mass index is 32.69 kg/m².    Physical Exam  Vitals reviewed.   Constitutional:       General: She is in acute distress.      Appearance: Normal appearance. She is not ill-appearing.   HENT:      Head: Normocephalic and atraumatic.   Cardiovascular:      Rate and Rhythm: Normal rate.      Pulses: Normal pulses.   Pulmonary:      Effort: Pulmonary effort is normal. No respiratory distress.   Musculoskeletal:      Cervical back: Spasms present.      Thoracic back: Spasms and tenderness present. Decreased range of motion.      Lumbar back: Spasms present. Decreased range of motion.   Skin:     General: Skin is warm and dry.   Neurological:      General: No focal deficit present.      Mental Status: She is alert and oriented to person, place, and time.   Psychiatric:         Mood and Affect: Mood normal.         Behavior: Behavior normal.       ssessment & Plan:     52 y.o. female with the following -     Problem List Items Addressed This Visit  "      Degenerative joint disease (DJD) of lumbar spine     Chronic, unstable. Reports worsening pain to spine, reduced ROM and flexibility.         Chronic left-sided low back pain without sciatica     Chronic, patient reports pain is severe to her back, slight improvement with meloxicam 15 mg daily, but temporary.   Continue meloxicam 15 mg daily          Cervical pain (neck)     Chronic, unstable. Pt reports pain from her cervical spine causing occasional n/t to fingertips to left upper extremity.         Chronic pain of both knees     Chronic, unstable. Reports worsening and increasing pain to bilateral knees secondary to arthritis.             Patient was educated in proper administration of medication(s) ordered today including safety, possible SE, risks, benefits, rationale and alternatives to therapy.   Supportive care, differential diagnoses, and indications for immediate follow-up discussed with patient.    Pathogenesis of diagnosis discussed including typical length and natural progression.    Instructed to return to clinic or nearest emergency department for any change in condition, further concerns, or worsening of symptoms.  Patient states understanding of the plan of care and discharge instructions.    Return if symptoms worsen or fail to improve.    Please note that this dictation was created using voice recognition software. I have made every reasonable attempt to correct obvious errors, but I expect that there are errors of grammar and possibly content that I did not discover before finalizing the note.
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
## 2023-08-14 ENCOUNTER — TELEPHONE (OUTPATIENT)
Dept: HEALTH INFORMATION MANAGEMENT | Facility: OTHER | Age: 53
End: 2023-08-14
Payer: COMMERCIAL

## 2023-09-07 ENCOUNTER — APPOINTMENT (OUTPATIENT)
Dept: PHYSICAL THERAPY | Facility: REHABILITATION | Age: 53
End: 2023-09-07
Payer: COMMERCIAL

## 2023-09-12 ENCOUNTER — APPOINTMENT (OUTPATIENT)
Dept: PHYSICAL THERAPY | Facility: REHABILITATION | Age: 53
End: 2023-09-12
Payer: COMMERCIAL

## 2023-09-12 ENCOUNTER — PHYSICAL THERAPY (OUTPATIENT)
Dept: PHYSICAL THERAPY | Facility: REHABILITATION | Age: 53
End: 2023-09-12
Payer: COMMERCIAL

## 2023-09-12 DIAGNOSIS — M54.50 CHRONIC LEFT-SIDED LOW BACK PAIN WITHOUT SCIATICA: ICD-10-CM

## 2023-09-12 DIAGNOSIS — G89.29 CHRONIC LEFT-SIDED LOW BACK PAIN WITHOUT SCIATICA: ICD-10-CM

## 2023-09-12 PROCEDURE — 97161 PT EVAL LOW COMPLEX 20 MIN: CPT

## 2023-09-12 PROCEDURE — 97535 SELF CARE MNGMENT TRAINING: CPT

## 2023-09-12 ASSESSMENT — ENCOUNTER SYMPTOMS
PAIN SCALE AT LOWEST: 6
QUALITY: SHARP
ALLEVIATING FACTORS: PHARMACOTHERAPY
QUALITY: BURNING
PAIN SCALE AT HIGHEST: 8

## 2023-09-12 NOTE — OP THERAPY EVALUATION
"  Outpatient Physical Therapy  INITIAL EVALUATION    [facility] [address].  [address]  [place] [address]  Phone:  [phone]  Fax:  [phone]    Date of Evaluation: 2023    Patient: Hortencia Bonilla  YOB: 1970  MRN: [id number]     Referring Provider: ELIJAH Palacios  [address] [place] [address] [address]  [place] [address]   Referring Diagnosis Chronic left-sided low back pain without sciatica [M54.50, G89.29]     Time Calculation                 Chief Complaint: No chief complaint on file.    Visit Diagnoses     ICD-10-CM   1. Chronic left-sided low back pain without sciatica  M54.50    G89.29       Date of onset of impairment: No data found    Subjective:   History of Present Illness:     Mechanism of injury:  Pt. is a 53 Y.O. F who reports mid back pain that is not resolving.  Pt. Has tried PT, pharmacology to no avail.  Her MD thought it would be good for Pt. To try PT again. Pt. is presently on disability and not working.   MD chart note reads: \"(Pt.) is here to discuss ongoing pain and disability with having to lift >15 lbs at work frequently. Having difficulty working with pain, feels her employer is not able to accommodate <15 lb weight lifting restrictions, and she is frequently lifting >18 lbs with pain and discomfort, additionally bending, pulling, stocking is painful, even with abdominal binder. She is asking about disability, and accommodations. Her back pain and arthritis has limited her ability for several years, and with fracture this has limited her ability to perform at work.  Continue meloxicam, medrometol.  \"     PMHx: significant for T12 Fx w/delayed healing noted in 2023     Pain:     At best pain ratin    At worst pain ratin    Location:  Middle of  back, L sided CV    Quality:  Burning and sharp (swelling)    Relieving factors:  Pharmacotherapy  Diagnostic Tests:     X-ray: abnormal    Treatments:     Previous " treatment:  Physical therapy    Treatment Comments:  Theraband ex.,stability ball for the back did not help.   Patient Goals:     Patient goals for therapy:  Decreased pain      Past Medical History:   Diagnosis Date    Bipolar disorder (HCC)     Thyroid nodule      Past Surgical History:   Procedure Laterality Date    PRIMARY C SECTION      x 1     Social History     Tobacco Use    Smoking status: Never    Smokeless tobacco: Never   Substance Use Topics    Alcohol use: No     Family and Occupational History     Socioeconomic History    Marital status:      Spouse name: Not on file    Number of children: Not on file    Years of education: Not on file    Highest education level: Not on file   Occupational History    Not on file       Objective     Postural Observations    Additional Postural Observation Details  Slight R trunk lean and R rotation    Palpation     Right   Hypertonic in the lumbar paraspinals.   Tenderness of the lumbar paraspinals.     Active Range of Motion     Lumbar   Flexion: within functional limits (P*)  Extension: within functional limits (to 0)  Left rotation: within functional limits  Right rotation: Back rotation right: 50%*    Passive Range of Motion     Lumbar   Extension: fixed  Left rotation: within functional limits  Right rotation: fixed        Therapeutic Exercises (CPT 39249):     1. cat-camel    2. T/S rotation      Therapeutic Exercise Summary: Home Exercise Program [[id number]]    Cat-Camel  -  Repetir (Repeat) 10 Veces ( Times), Sostener (Hold) 5 Segundos (Seconds), Completo (Complete) 1 Colocar (Set), Llevar a cabo (Perform) 3, Veces (Times) a Day    Thread the Needle -  Repetir (Repeat) 5 Veces ( Times), Sostener (Hold) 5 Segundos (Seconds), Llevar a cabo (Perform) 3, Veces (Times) a Day    Sidelying Thoracic Rotation - optional way -  Repetir (Repeat) 10 Veces ( Times), Sostener (Hold) 10 Segundos (Seconds), Completo (Complete) 1 Colocar (Set), Llevar a cabo (Perform) 3,  Misa (Times) a Day      Time-based treatments/modalities:           Assessment, Response and Plan:   Impairments: abnormal or restricted ROM, lacks appropriate home exercise program, limited mobility and pain with function    Assessment details:  53 y.o F presents with above impairments following T12 compression Fx, she is in the chronic stage. Pt. Is appropriate for skilled PT intervention. Pt. was educated in anatomical, pathoanatomical considerations of PT diagosis and treatment options to address it.  Pt. Also educated in HEP /with a handout and compliance with PT visits/HEP to improve functional limitations and reach goals.     Barriers to therapy:  None  Prognosis: fair    Goals:   Short Term Goals:     1. Pt will be independent with HEP 3-5x per week for decreasing pain, improving ROM    2. Pt will demo ability to initiate core contraction mechanics in supine, sitting, standing for improved stability  3. Pt will report ability to relieve pain by standing for 15-20 minutes with pain 3/10 or less         Short term goal time span:  2-4 weeks      Long Term Goals:       1. Pt will demo improve posture w/scapulae in more normalized position    2. Pt will demo bridge isometric 2 minutes or better for improved stabilty and decreased pain  3. Pt will report ability to relieve pain by standing for <or= 10 minutes with pain 3/10 or less    Long term goal time span:  6-8 weeks    Plan:   Therapy options:  Physical therapy treatment to continue  Planned therapy interventions:  Self Care ADL Training (CPT 61211), E Stim Unattended (CPT 18418), Therapeutic Exercise (CPT 96015), Therapeutic Activities (CPT 10483), Manual Therapy (CPT 83325) and Neuromuscular Re-education (CPT 22381)  Frequency:  2x week  Duration in visits:  12  Discussed with:  Patient      Functional Assessment Used        Referring provider co-signature:  I have reviewed this plan of care and my co-signature certifies the need for  services.    Certification Period: 09/12/2023 to  10/24/23    Physician Signature: ________________________________ Date: ______________

## 2023-09-14 ENCOUNTER — PHYSICAL THERAPY (OUTPATIENT)
Dept: PHYSICAL THERAPY | Facility: REHABILITATION | Age: 53
End: 2023-09-14
Payer: COMMERCIAL

## 2023-09-14 DIAGNOSIS — M54.40 CHRONIC LEFT-SIDED LOW BACK PAIN WITH SCIATICA, SCIATICA LATERALITY UNSPECIFIED: ICD-10-CM

## 2023-09-14 DIAGNOSIS — G89.29 CHRONIC LEFT-SIDED LOW BACK PAIN WITH SCIATICA, SCIATICA LATERALITY UNSPECIFIED: ICD-10-CM

## 2023-09-14 PROCEDURE — 97535 SELF CARE MNGMENT TRAINING: CPT

## 2023-09-14 PROCEDURE — 97110 THERAPEUTIC EXERCISES: CPT

## 2023-09-14 NOTE — OP THERAPY DAILY TREATMENT
Outpatient Physical Therapy  DAILY TREATMENT     [facility] Physical [address].  [address]  [place] [address]  Phone:  [phone]  Fax:  [phone]    Date: 09/14/2023    Patient: Hortencia Bonilla  YOB: 1970  MRN: [id number]     Time Calculation    Start time: 1445  Stop time: 1530 Time Calculation (min): 45 minutes         Chief Complaint: No chief complaint on file.    Visit #: 2    SUBJECTIVE:  Pt.s back is the same in low t/s upper L/S.  She c/o difficulty sleeping at night.   Pt. Is some english speaking/PT is some Armenian speaking and agreeable to no interpruter    OBJECTIVE:  Current objective measures:           Therapeutic Exercises (CPT 43700):     1. pelvic a-p, lat, cw, ccw, 14 x stability ball, demo    2. seated march, LAQ, 14x stability ball, demo    3. bridge w/ft. on ball, 1x 10, VC/TC sequence    4. hs curls, 1x10, VC sequence    5. KFO +TrA, l, r 10x, TC for bracing w/sheet under L/S    6. prone traction, 5', pain abolished, Good return demo of self care technique to carry out at home w/handout.    Therapeutic Treatments and Modalities:     1. Self Care ADL Training (CPT 23706), self-tractioning prone over end of table; sleeping  w/pillow support supine and S/Lpositions    Therapeutic Treatment and Modalities Summary: Home Exercise Program [[id number]]    PRONE FLEXED TRACTION - MODERATE FORCE -  Repetir (Repeat) 1 Tiempo (Time), Sostener (Hold) 3 Minutos (Minutes), Completo (Complete) 1 Colocar (Set), Llevar a cabo (Perform) 1,    Sidelying Sleeping Position  w/pillow support-     Time-based treatments/modalities:    Physical Therapy Timed Treatment Charges  Functional training, self care minutes (CPT 91636): 10 minutes  Therapeutic exercise minutes (CPT 03064): 30 minutes      Pain rating (1-10) before treatment:  4-5  Pain rating (1-10) after treatment:  0    ASSESSMENT:   Response to treatment: pt. Demo'd good understanding and participation w/session. She  had very mild increasing pain with pelvic frontal motion.  Traction abolishes pain.       PLAN/RECOMMENDATIONS:   Plan for treatment: therapy treatment to continue next visit.  Planned interventions for next visit: continue with current treatment.

## 2023-09-19 ENCOUNTER — PHYSICAL THERAPY (OUTPATIENT)
Dept: PHYSICAL THERAPY | Facility: REHABILITATION | Age: 53
End: 2023-09-19
Payer: COMMERCIAL

## 2023-09-19 DIAGNOSIS — G89.29 CHRONIC LEFT-SIDED LOW BACK PAIN WITHOUT SCIATICA: ICD-10-CM

## 2023-09-19 DIAGNOSIS — M54.50 CHRONIC LEFT-SIDED LOW BACK PAIN WITHOUT SCIATICA: ICD-10-CM

## 2023-09-19 PROCEDURE — 97012 MECHANICAL TRACTION THERAPY: CPT

## 2023-09-19 PROCEDURE — 97110 THERAPEUTIC EXERCISES: CPT

## 2023-09-19 NOTE — OP THERAPY DAILY TREATMENT
"  Outpatient Physical Therapy  DAILY TREATMENT     [facility] Physical [address].  [address]  [place] [address]  Phone:  [phone]  Fax:  [phone]    Date: 09/19/2023    Patient: Hortencia Bonilla  YOB: 1970  MRN: [id number]     Time Calculation    Start time: 1446  Stop time: 1530 Time Calculation (min): 44 minutes         Chief Complaint: No chief complaint on file.    Visit #: 3    SUBJECTIVE:  Pt.s back is the same in low t/s upper L/S.  She did try the self traction with good results.  Pt. Is some english speaking/PT is some Gabonese speaking and agreeable to no interpruter    Pain rating (1-10) before treatment:  4-5  Pain rating (1-10) after treatment:  2-3  Location:  Middle of  back, L sided CV    Quality:  Burning and sharp (swelling)  OBJECTIVE:  Current objective measures:           Therapeutic Exercises (CPT 02103):     1. pelvic a-p, cw, ccw, 14 x stability ball, demo 1-2 then pt. I    2. seated march, LAQ, 14x stability ball, demo    3. bridge w/ft. on ball, 1x 8 x 1\"-5\", VC/TC sequence, pain w/bridge holds    4. hs curls, 1x10, VC sequence    5. KFO +TrA, l, r 10x, VC/TC for bracing w/sheet under L/S    Therapeutic Treatments and Modalities:     1. Self Care ADL Training (CPT 77279), self-tractioning prone over end of table; sleeping  w/pillow support supine and S/Lpositions    2. Mechanical Traction (CPT 83882), L/S 80-90# x60\" 35# x20\" x15 min., w/MHP    Therapeutic Treatment and Modalities Summary: Home Exercise Program [[id number]]    PRONE FLEXED TRACTION - MODERATE FORCE -  Repetir (Repeat) 1 Tiempo (Time), Sostener (Hold) 3 Minutos (Minutes), Completo (Complete) 1 Colocar (Set), Llevar a cabo (Perform) 1,    Sidelying Sleeping Position  w/pillow support-     Time-based treatments/modalities:    Physical Therapy Timed Treatment Charges  Therapeutic exercise minutes (CPT 06385): 24 minutes    ASSESSMENT:   Response to treatment: ptJoo Bella'd good understanding " and participation w/session. She had mild increasing pain with bridge holds. Traction lowered pain.       PLAN/RECOMMENDATIONS:   Plan for treatment: therapy treatment to continue next visit.  Planned interventions for next visit: continue with current treatment.

## 2023-09-21 ENCOUNTER — PHYSICAL THERAPY (OUTPATIENT)
Dept: PHYSICAL THERAPY | Facility: REHABILITATION | Age: 53
End: 2023-09-21
Payer: COMMERCIAL

## 2023-09-21 DIAGNOSIS — M54.50 CHRONIC LEFT-SIDED LOW BACK PAIN WITHOUT SCIATICA: ICD-10-CM

## 2023-09-21 DIAGNOSIS — G89.29 CHRONIC LEFT-SIDED LOW BACK PAIN WITHOUT SCIATICA: ICD-10-CM

## 2023-09-21 PROCEDURE — 97110 THERAPEUTIC EXERCISES: CPT

## 2023-09-21 PROCEDURE — 97012 MECHANICAL TRACTION THERAPY: CPT

## 2023-09-21 NOTE — OP THERAPY DAILY TREATMENT
"  Outpatient Physical Therapy  DAILY TREATMENT     [facility] Physical [address]  [address]  [place] [address]  Phone:  [phone]  Fax:  [phone]    Date: 09/21/2023    Patient: Hortencia Bonilla  YOB: 1970  MRN: [id number]     Time Calculation    Start time: 1450  Stop time: 1535 Time Calculation (min): 45 minutes         Chief Complaint: Back Problem    Visit #: 4    SUBJECTIVE:  Pt.s back is the same in low t/s upper L/S.  She did try the self traction with good results.  Pt. Is some english speaking/PT is some Korean speaking and agreeable to no interpruter    Pain rating (1-10) before treatment:  4-5  Pain rating (1-10) after treatment:  2-3  Location:  Middle of  back, L sided CV    Quality:  Burning and sharp (swelling)  OBJECTIVE:  Current objective measures:           Therapeutic Exercises (CPT 15830):     1. pelvic a-p, cw, ccw, 14 x stability ball, pt. I    2. seated march, LAQ, 14x stability ball, pt. I    3. bridge w/ft. on ball, 1x 8, VC/TC sequence, pain w/bridge holds, -    5. KFO +TrA, l, r 10x, VC/TC for bracing w/sheet under L/S    6. prone Qped Ue ext, L, R, 5  stability ball, demo, VC sequence    Therapeutic Treatments and Modalities:     2. Mechanical Traction (CPT 87137), L/S 8# x60\" 45# x20\" x15 min., w/MHP    Therapeutic Treatment and Modalities Summary: Home Exercise Program [[id number]]    PRONE FLEXED TRACTION - MODERATE FORCE -  Repetir (Repeat) 1 Tiempo (Time), Sostener (Hold) 3 Minutos (Minutes), Completo (Complete) 1 Colocar (Set), Llevar a cabo (Perform) 1,    Sidelying Sleeping Position  w/pillow support-     Time-based treatments/modalities:    Physical Therapy Timed Treatment Charges  Therapeutic exercise minutes (CPT 51488): 25 minutes    ASSESSMENT:   Response to treatment: pt.p/w extension sensitivity, flexion bias symptoms  Demo'd good understanding and participation w/session. Traction lowered pain.       PLAN/RECOMMENDATIONS:   Plan " for treatment: therapy treatment to continue next visit.  Planned interventions for next visit: continue with current treatment.

## 2023-09-26 ENCOUNTER — OFFICE VISIT (OUTPATIENT)
Dept: MEDICAL GROUP | Facility: PHYSICIAN GROUP | Age: 53
End: 2023-09-26
Payer: COMMERCIAL

## 2023-09-26 ENCOUNTER — HOSPITAL ENCOUNTER (OUTPATIENT)
Facility: MEDICAL CENTER | Age: 53
End: 2023-09-26
Payer: COMMERCIAL

## 2023-09-26 ENCOUNTER — PHYSICAL THERAPY (OUTPATIENT)
Dept: PHYSICAL THERAPY | Facility: REHABILITATION | Age: 53
End: 2023-09-26
Payer: COMMERCIAL

## 2023-09-26 VITALS
BODY MASS INDEX: 32.43 KG/M2 | SYSTOLIC BLOOD PRESSURE: 112 MMHG | HEIGHT: 68 IN | TEMPERATURE: 97.3 F | DIASTOLIC BLOOD PRESSURE: 70 MMHG | WEIGHT: 214 LBS | HEART RATE: 80 BPM | OXYGEN SATURATION: 95 %

## 2023-09-26 DIAGNOSIS — R68.82 REDUCED LIBIDO: ICD-10-CM

## 2023-09-26 DIAGNOSIS — L29.2 VULVOVAGINAL ITCHING: ICD-10-CM

## 2023-09-26 DIAGNOSIS — G89.29 CHRONIC LEFT-SIDED LOW BACK PAIN WITHOUT SCIATICA: ICD-10-CM

## 2023-09-26 DIAGNOSIS — Z13.29 THYROID DISORDER SCREEN: ICD-10-CM

## 2023-09-26 DIAGNOSIS — M54.50 CHRONIC LEFT-SIDED LOW BACK PAIN WITHOUT SCIATICA: ICD-10-CM

## 2023-09-26 DIAGNOSIS — Z23 NEED FOR VACCINATION: ICD-10-CM

## 2023-09-26 DIAGNOSIS — E66.9 OBESITY (BMI 30-39.9): ICD-10-CM

## 2023-09-26 PROCEDURE — 97012 MECHANICAL TRACTION THERAPY: CPT

## 2023-09-26 PROCEDURE — 90686 IIV4 VACC NO PRSV 0.5 ML IM: CPT

## 2023-09-26 PROCEDURE — 90471 IMMUNIZATION ADMIN: CPT

## 2023-09-26 PROCEDURE — 87510 GARDNER VAG DNA DIR PROBE: CPT

## 2023-09-26 PROCEDURE — 99214 OFFICE O/P EST MOD 30 MIN: CPT | Mod: 25

## 2023-09-26 PROCEDURE — 87660 TRICHOMONAS VAGIN DIR PROBE: CPT

## 2023-09-26 PROCEDURE — 97110 THERAPEUTIC EXERCISES: CPT

## 2023-09-26 PROCEDURE — 3074F SYST BP LT 130 MM HG: CPT

## 2023-09-26 PROCEDURE — 3078F DIAST BP <80 MM HG: CPT

## 2023-09-26 PROCEDURE — 87480 CANDIDA DNA DIR PROBE: CPT

## 2023-09-26 ASSESSMENT — FIBROSIS 4 INDEX: FIB4 SCORE: 0.94

## 2023-09-26 NOTE — ASSESSMENT & PLAN NOTE
Chronic, ongoing. Continues to see PT for this, reports no improvement in symptoms.  Continue tizanidine every 6 hours as needed   Continue meloxicam 15 mg daily for pain

## 2023-09-26 NOTE — OP THERAPY DAILY TREATMENT
"  Outpatient Physical Therapy  DAILY TREATMENT     [facility] Physical [address].  [address]  [place] [address]  Phone:  [phone]  Fax:  [phone]    Date: 09/26/2023    Patient: Hortencia Bonilla  YOB: 1970  MRN: [id number]     Time Calculation    Start time: 1445  Stop time: 1530 Time Calculation (min): 45 minutes         Chief Complaint: No chief complaint on file.    Visit #: 5    SUBJECTIVE:  Pt. Reported to her MD that PT is not helping her back. is the same in low t/s upper L/S.  She did try the self traction with good results.  Pt. Is some english speaking/PT is some Emirati speaking and agreeable to no interpruter    Pain rating (1-10) before treatment:  4-5  Pain rating (1-10) after treatment:  2-3  Location:  Middle of  back, L sided CV    Quality:  Burning and sharp (swelling)  OBJECTIVE:  Current objective measures:           Therapeutic Exercises (CPT 46229):     1. Isometric abdominals, L, R 1x5    2. seated march, LAQ, -    3. bridge - neutral, 2x 4-5, VC/TC sequence,    4. PPU, 5    5. KFO +TrA, l, r 10x, VC/TC for bracing w/sheet under L/S    6. bridge w/ft. on ball, L, R, 8 stability ball, demo, VC sequence    7. S/L hip ABD w/KF, 8-10, VC for TvA sequencing    Therapeutic Treatments and Modalities:     2. Mechanical Traction (CPT 41199), L/S 85# x60\" 45# x20\" x15 min., w/MHP    Therapeutic Treatment and Modalities Summary: Home Exercise Program [[id number]]    PRONE FLEXED TRACTION - MODERATE FORCE -  Repetir (Repeat) 1 Tiempo (Time), Sostener (Hold) 3 Minutos (Minutes), Completo (Complete) 1 Colocar (Set), Llevar a cabo (Perform) 1,    Sidelying Sleeping Position  w/pillow support-     Time-based treatments/modalities:    Physical Therapy Timed Treatment Charges  Therapeutic exercise minutes (CPT 12249): 30 minutes    ASSESSMENT:   Response to treatment: pt.p/w extension sensitivity, flexion bias symptoms. Attempted bridge progression and s/l and prone " positions today. However, pt. Sx seems to not be improving.       PLAN/RECOMMENDATIONS:   Plan for treatment: therapy treatment to continue next visit.  Planned interventions for next visit: continue with current treatment.

## 2023-09-26 NOTE — PROGRESS NOTES
"Subjective:     CC: Diagnoses of Vulvovaginal itching, Obesity (BMI 30-39.9), Thyroid disorder screen, Reduced libido, and Chronic left-sided low back pain without sciatica were pertinent to this visit.    Pt presents today with multiple concerns. Tajik translation via [employer].     Has upcoming biopsy 10/9/23 for lump to left neck.     HPI:   Hortencia presents today with    Problem   Vulvovaginal Itching   Chronic Left-Sided Low Back Pain Without Sciatica     ROS:  Review of Systems   HENT:          Itching scalp   Genitourinary:         Vaginal itching/dryness/painful coitus   All other systems reviewed and are negative.      Objective:     Exam:  /70 (BP Location: Right arm, Patient Position: Sitting, BP Cuff Size: Small adult)   Pulse 80   Temp 36.3 °C (97.3 °F) (Temporal)   Ht 1.727 m (5' 8\")   Wt 97.1 kg (214 lb)   LMP 11/27/2012   SpO2 95%   BMI 32.54 kg/m²  Body mass index is 32.54 kg/m².    Physical Exam  Vitals reviewed.   Constitutional:       General: She is not in acute distress.     Appearance: Normal appearance. She is not ill-appearing.   HENT:      Head: Normocephalic and atraumatic.   Cardiovascular:      Rate and Rhythm: Normal rate.      Pulses: Normal pulses.   Pulmonary:      Effort: Pulmonary effort is normal. No respiratory distress.   Skin:     General: Skin is warm and dry.      Findings: No rash.      Comments: Scalp appears without erythema, flaking, or dryness   Neurological:      General: No focal deficit present.      Mental Status: She is alert and oriented to person, place, and time.   Psychiatric:         Mood and Affect: Mood normal.         Behavior: Behavior normal.       Assessment & Plan:     53 y.o. female with the following -     Problem List Items Addressed This Visit       Obesity (BMI 30-39.9)    Relevant Orders    HEMOGLOBIN A1C    Comp Metabolic Panel    Lipid Profile    Chronic left-sided low back pain without sciatica     Chronic, ongoing. " Continues to see PT for this, reports no improvement in symptoms.  Continue tizanidine every 6 hours as needed   Continue meloxicam 15 mg daily for pain         Vulvovaginal itching     Chronic, ongoing for 2-4 months. Reports infection several months ago and symptoms never really improved.   Denies odor or discharge. Reports dryness. Reports reduced sexual libido, which is problematic in marriage.  Denies other symptoms of menopause including hot flashes/night sweats.    Will collect BD affirm to rule out infection  Recommend conservative treatment with water based lubricant to reduce discomfort   Will provide referral to GYN for further management          Relevant Orders    VAGINAL PATHOGENS DNA PANEL    Referral to Gynecology     Other Visit Diagnoses       Thyroid disorder screen        Relevant Orders    TSH    Reduced libido        Relevant Orders    Referral to Gynecology            Patient was educated in proper administration of medication(s) ordered today including safety, possible SE, risks, benefits, rationale and alternatives to therapy.   Supportive care, differential diagnoses, and indications for immediate follow-up discussed with patient.    Pathogenesis of diagnosis discussed including typical length and natural progression.    Instructed to return to clinic or nearest emergency department for any change in condition, further concerns, or worsening of symptoms.  Patient states understanding of the plan of care and discharge instructions.    Return in about 3 months (around 12/26/2023) for Labs, wellness.    Please note that this dictation was created using voice recognition software. I have made every reasonable attempt to correct obvious errors, but I expect that there are errors of grammar and possibly content that I did not discover before finalizing the note.

## 2023-09-26 NOTE — PATIENT INSTRUCTIONS
"OB & Gyn - Gynecology     MONI ZAIDI A  [address] [place] [address]  Phone: [phone]    ¿Qué es la resequedad vaginal?  La resequedad vaginal ocurre cuando la vagina y la vulva se secan, se debilitan y se inflaman. (La vulva es la shanelle de alrededor de la vagina). [place] puede ocasionar molestias o causar dolor yovanny las relaciones sexuales. Ocurre cuando santana cuerpo no genera suficiente cantidad de jose d hormona llamada estrógeno, lo cual suele relacionarse con la menopausia (cuando carlos de tener el período mensual). También puede suceder si le sacaron los ovarios, o si danna ciertas medicinas o está amamantando.  Algunos términos médicos para referirse a la resequedad vaginal son \"atrofia vaginal\", \"atrofia vulvovaginal\" y \"vaginitis atrófica\". Si jose síntomas están relacionados con la menopausia, el médico también podría usar el término \"síndrome genitourinario de la menopausia\".  ¿Qué otros síntomas pueden ocurrir?  Algunas personas pueden tener otros síntomas además de la resequedad. Esos síntomas pueden ser, entre otros:  ?Ardor o irritación vaginal  ?Producir menos lubricación (humedad) yovanny las relaciones sexuales  ?Dolor yovanny las relaciones sexuales  ?Sangrado cuando algo toca o frota la vagina, por ejemplo, después de tener relaciones sexuales. (Si tiene courtney síntoma, consulte al médico).  ?Secreción de flujo o líquido de la vagina  ?Problemas urinarios, por ejemplo, necesidad de orinar con frecuencia, dolor al orinar o pérdidas de orina. (Si tiene estos síntomas, consulte al médico).  ¿Yamilet consultar a un médico o enfermero?  Consulte a santana médico o enfermero si tiene resequedad vaginal o síntomas relacionados que le causan molestias.  Algunas personas nunca le dicen al médico que tienen estos síntomas. Con frecuencia, se sienten avergonzadas o creen que los síntomas son parte normal del envejecimiento, kan la resequedad vaginal es un problema médico frecuente y existen tratamientos " "que pueden ayudar.  ¿Hay algo que pueda hacer por mi cuenta para sentirme mejor?  Sí. Algunas personas se sienten mejor si usan lubricantes antes de tener relaciones sexuales y un humectante vaginal varias veces por semana. Los humectantes vaginales (ejemplos de marcas comerciales: Replens, K-Y SILK-E) no son lo mismo que los lubricantes. Permiten mantener la humedad de la vagina todo el tiempo, no solo yovanny las relaciones sexuales.  Además, debe tener buenas prácticas de higiene de la vagina y la vulva. Para eso, evite los solo de burbujas y las duchas vaginales, y no se coloque jabones ni lociones perfumados en la shanelle genital, ya que estas pueden causar irritación.  ¿Puedo seguir teniendo relaciones sexuales?  Sí, si no le causa molestias. La actividad sexual regular, ya sea por santana cuenta o con jose d randy, puede ayudar a preservar la elasticidad de los tejidos de la vagina. Sin embargo, si las relaciones sexuales le causan molestias o dolor, debe lisseth al médico.  ¿Cómo se trata la resequedad vaginal?  El tratamiento más efectivo para la resequedad vaginal es la hormona estrógeno. En esta situación, los médicos recomiendan el \"estrógeno vaginal\", que es cualquier tipo de estrógeno que se coloque directamente en la vagina. Viene en crema, óvulos o anillos flexibles. El estrógeno vaginal viene en dosis pequeñas, por lo que no aumenta mucho los niveles de estrógeno en otras partes del cuerpo.  El estrógeno también viene en dosis más altas. Estas vienen en forma de píldora, parche para piel u otro tipo de anillo vaginal. Estas medicinas a veces reciben el nombre de “terapia hormonal”. El estrógeno vaginal es mejor para tratar los síntomas de la resequedad vaginal. Sin embargo, si usted tiene otros síntomas de la menopausia, tales celeste bochornos o sudoración nocturna, el estrógeno en dosis más altas podría ser jose d opción. Santana médico o enfermero puede darle información sobre esto. Existen distintos riesgos y " "beneficios y algunas personas no pueden vicki dosis altas de hormonas en forma novoa.  Además del estrógeno, estas son otras medicinas que pueden tratar la resequedad vaginal:  ?Ospemifeno (jerome comercial: Osphena) - Es similar al estrógeno, kan no es estrógeno. Viene en forma de píldora que se danna jose d vez por día. Puede causar bochornos.  ?Prasterona - También se llama \"DHEA\". Es jose d medicina que se coloca en la vagina jose d vez por día. Viene en forma de \"supositorio\". Un supositorio es similar a jose d tableta o píldora kan se introduce directamente en la vagina.  También hay otros tratamientos disponibles, kan no se usan con tanta frecuencia.  ¿A qué problemas rashid prestar atención?  Llame a santana médico o enfermero para pedir consejo si:  ?Loyda síntomas no mejoran con tratamiento, o empeoran.  ?Tiene signos de jose d infección urinaria - Algunos de ellos podrían ser fiebre de 100.4 °F (38 °C) o más, escalofríos, dolor u ardor al orinar o yadira en la orina.  "

## 2023-09-27 LAB
CANDIDA DNA VAG QL PROBE+SIG AMP: NEGATIVE
G VAGINALIS DNA VAG QL PROBE+SIG AMP: NEGATIVE
T VAGINALIS DNA VAG QL PROBE+SIG AMP: NEGATIVE

## 2023-09-28 ENCOUNTER — PHYSICAL THERAPY (OUTPATIENT)
Dept: PHYSICAL THERAPY | Facility: REHABILITATION | Age: 53
End: 2023-09-28
Payer: COMMERCIAL

## 2023-09-28 DIAGNOSIS — G89.29 CHRONIC LOW BACK PAIN WITHOUT SCIATICA, UNSPECIFIED BACK PAIN LATERALITY: ICD-10-CM

## 2023-09-28 DIAGNOSIS — M54.50 CHRONIC LOW BACK PAIN WITHOUT SCIATICA, UNSPECIFIED BACK PAIN LATERALITY: ICD-10-CM

## 2023-09-28 PROCEDURE — 97535 SELF CARE MNGMENT TRAINING: CPT

## 2023-09-28 PROCEDURE — 97110 THERAPEUTIC EXERCISES: CPT

## 2023-09-28 NOTE — OP THERAPY DAILY TREATMENT
"  Outpatient Physical Therapy  DAILY TREATMENT     [facility] Physical [address]  [address]  [place] [address]  Phone:  [phone]  Fax:  [phone]    Date: 09/28/2023    Patient: Hortencia Bonilla  YOB: 1970  MRN: [id number]     Time Calculation    Start time: 1445  Stop time: 1530 Time Calculation (min): 45 minutes         Chief Complaint: Back Problem    Visit #: 6    SUBJECTIVE:  Pt. Reported that her back was sore yesterday, today she wants to skip the traction.    Pt. Is some english speaking/PT is some Eritrean speaking and agreeable to no interpruter    Pain rating (1-10) before treatment:  4-5  Pain rating (1-10) after treatment:  2-3  Location:  Middle of  back, L sided CV    Quality:  Burning and sharp (swelling)  OBJECTIVE:  Current objective measures:           Therapeutic Exercises (CPT 85857):     1. Isometric abdominals - hand to knee press, L, R 1x5    2. seated march, LAQ, -    3. bridge - neutral, hold d/t pain last visit    4. PPU, 5    5. KFO +TrA, l, r 10x, VC/TC for bracing w/sheet under L/S    6. bridge w/ft. on ball    7. S/L hip ABD w/KF, 10, VC for TvA sequencing    8. Nustep seat 11, L 4 7:30', 239 steps    9. back extension -ball up wall, 10    10. piriformis stretch, 30\"    11. self traction over table end, 5'      Therapeutic Exercise Summary: HeP  Diaphram breath w/ab set  KFO  S/L hip abduction with bent knee  2x10 reps 1x/day    Therapeutic Treatments and Modalities:     1. Self Care ADL Training (CPT 54526), hep instruction, see ex .summary    2. Mechanical Traction (CPT 29313), not today    Therapeutic Treatment and Modalities Summary: Home Exercise Program [[id number]]    PRONE FLEXED TRACTION - MODERATE FORCE -  Repetir (Repeat) 1 Tiempo (Time), Sostener (Hold) 3 Minutos (Minutes), Completo (Complete) 1 Colocar (Set), Llevar a cabo (Perform) 1,    Sidelying Sleeping Position  w/pillow support-     Time-based treatments/modalities:    Physical " Therapy Timed Treatment Charges  Functional training, self care minutes (CPT 32923): 10 minutes  Therapeutic exercise minutes (CPT 82649): 30 minutes    ASSESSMENT:   Response to treatment: pt.p/w extension sensitivity, flexion bias symptoms. Attempted bridge progression and s/l and prone positions today. However, pt. Sx seems to not be improving.       PLAN/RECOMMENDATIONS:   Plan for treatment: therapy treatment to continue next visit.  Planned interventions for next visit: continue with current treatment.

## 2023-10-03 ENCOUNTER — PHYSICAL THERAPY (OUTPATIENT)
Dept: PHYSICAL THERAPY | Facility: REHABILITATION | Age: 53
End: 2023-10-03
Payer: COMMERCIAL

## 2023-10-03 DIAGNOSIS — M54.50 CHRONIC LEFT-SIDED LOW BACK PAIN WITHOUT SCIATICA: ICD-10-CM

## 2023-10-03 DIAGNOSIS — G89.29 CHRONIC LEFT-SIDED LOW BACK PAIN WITHOUT SCIATICA: ICD-10-CM

## 2023-10-03 PROCEDURE — 97535 SELF CARE MNGMENT TRAINING: CPT

## 2023-10-03 PROCEDURE — 97110 THERAPEUTIC EXERCISES: CPT

## 2023-10-03 NOTE — OP THERAPY DAILY TREATMENT
"  Outpatient Physical Therapy  DAILY TREATMENT     [facility] Physical Therapy [address].  [address]  [place] [address]  Phone:  [phone]  Fax:  [phone]    Date: 10/03/2023    Patient: Hortencia Bonilla  YOB: 1970  MRN: [id number]     Time Calculation    Start time: 1445  Stop time: 1530 Time Calculation (min): 45 minutes         Chief Complaint: Back Problem    Visit #: 7    SUBJECTIVE:  Pt. Reports feeling the same as when she started PT.  In fact the exercises are not helping her. Only her pain medication is helping to decrease her pain from severeShe will be following up with her MD at the end of the month and will discuss next step. Pt. is presently on disability and not working. She feels that she cannot RTW d/t her back problem. She asked about disability forms from her PT.  Pt. Is some english speaking/PT is some Faroese speaking  used for part of session.    Pain rating (1-10) before treatment:  4-5  Pain rating (1-10) after treatment:  4-5  Location:  Middle of  back, L sided CV    Quality:  Burning and sharp (swelling)  OBJECTIVE:  Current objective measures:           Therapeutic Exercises (CPT 52776):     1. Isometric abdominals - hand to knee press    2. seated march, LAQ    3. bridge - neutral, hold d/t pain last visit    4. PPU, 5x2\"    5. KFO +TrA, 8-10    6. bridge w/ft. on ball, -, d/c    7. S/L hip ABD w/KF    8. Nustep seat 11, L 2 5', 450 steps    9. back extension -ball up wall    10. piriformis stretch    11. self traction over table end      Therapeutic Exercise Summary: HeP  SKTC, LTR 5x10\" ea  Diaphram breath w/ab set  KFO  S/L hip abduction with bent knee  2x10 reps  ea 1x/day    Therapeutic Treatments and Modalities:     1. Self Care ADL Training (CPT 77074), review of goals; return to MD to ask about disability status, self care technique pt. returns demo correctly x 10', see ex .summary    Therapeutic Treatment and Modalities Summary: Home " Exercise Program [[id number]]    PRONE FLEXED TRACTION - MODERATE FORCE -  Repetir (Repeat) 1 Tiempo (Time), Sostener (Hold) 3 Minutos (Minutes), Completo (Complete) 1 Colocar (Set), Jose J cooneyo (Perform) 1,        Time-based treatments/modalities:    Physical Therapy Timed Treatment Charges  Functional training, self care minutes (CPT 27404): 15 minutes  Therapeutic exercise minutes (CPT 02981): 23 minutes  ASSESSMENT:   Response to treatment: Pt.p/w extension sensitivity, flexion bias low back symptoms.     Short Term Goals:     1. Pt will be independent with HEP 3-5x per week for decreasing pain, improving ROM   -pain continues to be an issue with the lumbar stabilization exercises, she has tried to do the prescribed exercises.  Exception, self- traction relaxes her.   2. Pt will demo ability to initiate core contraction mechanics in supine, sitting, standing for improved stability - Partial met - supine  3. Pt will report ability to relieve pain by standing/walking for 15-20 minutes with pain 3/10 or less.   Short term goal time span:  2-4 weeks      Long Term Goals:       1. Pt will demo bridge isometric 2 minutes or better for improved stabilty and decreased pain - Not met, cannot tolerate the exercise  2. Pt will report ability to relieve pain by standing/walking for <or= 10 minutes with pain 3/10 or less - Not met     PLAN/RECOMMENDATIONS:   Plan for treatment: refer patient back to MD.

## 2023-10-07 ENCOUNTER — HOSPITAL ENCOUNTER (OUTPATIENT)
Dept: LAB | Facility: MEDICAL CENTER | Age: 53
End: 2023-10-07
Payer: COMMERCIAL

## 2023-10-07 DIAGNOSIS — E66.9 OBESITY (BMI 30-39.9): ICD-10-CM

## 2023-10-07 DIAGNOSIS — Z13.29 THYROID DISORDER SCREEN: ICD-10-CM

## 2023-10-07 LAB
ALBUMIN SERPL BCP-MCNC: 4.4 G/DL (ref 3.2–4.9)
ALBUMIN/GLOB SERPL: 1.2 G/DL
ALP SERPL-CCNC: 84 U/L (ref 30–99)
ALT SERPL-CCNC: 28 U/L (ref 2–50)
ANION GAP SERPL CALC-SCNC: 7 MMOL/L (ref 7–16)
AST SERPL-CCNC: 20 U/L (ref 12–45)
BILIRUB SERPL-MCNC: 0.4 MG/DL (ref 0.1–1.5)
BUN SERPL-MCNC: 15 MG/DL (ref 8–22)
CALCIUM ALBUM COR SERPL-MCNC: 9.4 MG/DL (ref 8.5–10.5)
CALCIUM SERPL-MCNC: 9.7 MG/DL (ref 8.5–10.5)
CHLORIDE SERPL-SCNC: 105 MMOL/L (ref 96–112)
CHOLEST SERPL-MCNC: 255 MG/DL (ref 100–199)
CO2 SERPL-SCNC: 28 MMOL/L (ref 20–33)
CREAT SERPL-MCNC: 0.58 MG/DL (ref 0.5–1.4)
EST. AVERAGE GLUCOSE BLD GHB EST-MCNC: 111 MG/DL
GFR SERPLBLD CREATININE-BSD FMLA CKD-EPI: 107 ML/MIN/1.73 M 2
GLOBULIN SER CALC-MCNC: 3.6 G/DL (ref 1.9–3.5)
GLUCOSE SERPL-MCNC: 93 MG/DL (ref 65–99)
HBA1C MFR BLD: 5.5 % (ref 4–5.6)
HDLC SERPL-MCNC: 62 MG/DL
LDLC SERPL CALC-MCNC: 167 MG/DL
POTASSIUM SERPL-SCNC: 4.4 MMOL/L (ref 3.6–5.5)
PROT SERPL-MCNC: 8 G/DL (ref 6–8.2)
SODIUM SERPL-SCNC: 140 MMOL/L (ref 135–145)
TRIGL SERPL-MCNC: 131 MG/DL (ref 0–149)
TSH SERPL DL<=0.005 MIU/L-ACNC: 0.64 UIU/ML (ref 0.38–5.33)

## 2023-10-07 PROCEDURE — 80053 COMPREHEN METABOLIC PANEL: CPT

## 2023-10-07 PROCEDURE — 83036 HEMOGLOBIN GLYCOSYLATED A1C: CPT

## 2023-10-07 PROCEDURE — 84443 ASSAY THYROID STIM HORMONE: CPT

## 2023-10-07 PROCEDURE — 36415 COLL VENOUS BLD VENIPUNCTURE: CPT

## 2023-10-07 PROCEDURE — 80061 LIPID PANEL: CPT

## 2023-10-09 NOTE — OP THERAPY DISCHARGE SUMMARY
Outpatient Physical Therapy  DISCHARGE SUMMARY NOTE      [facility] Physical Therapy [address].  [address]  [place] [address]  Phone:  [phone]  Fax:  [phone]    Date of Visit: 10/03/2023    Patient: Hortencia Bonilla  YOB: 1970  MRN: [id number]     Referring Provider: ELIJAH Palacios  [address] [place] [address] [address]  [place] [address]   Referring Diagnosis Low back pain, unspecified [M54.50];Other chronic pain [G89.29]         SOC: 09/12/23; Visits: 7        Your patient is being discharged from Physical Therapy with the following comments:   Goals not met    Comments:  pain continues to be an issue with daily activity such as standing /walking tolerances and with the lumbar stabilization exercises.  She has tried to do the prescribed exercises, but has pain - exception self- traction relaxes her.      You may refer to my daily note for more details.     Cheryl Armendariz, PT    Date: 10/9/2023

## 2023-10-11 ENCOUNTER — OFFICE VISIT (OUTPATIENT)
Dept: PHYSICAL MEDICINE AND REHAB | Facility: MEDICAL CENTER | Age: 53
End: 2023-10-11
Payer: COMMERCIAL

## 2023-10-11 VITALS
HEIGHT: 68 IN | HEART RATE: 81 BPM | DIASTOLIC BLOOD PRESSURE: 64 MMHG | BODY MASS INDEX: 32.44 KG/M2 | TEMPERATURE: 96.8 F | OXYGEN SATURATION: 95 % | WEIGHT: 214.07 LBS | SYSTOLIC BLOOD PRESSURE: 118 MMHG

## 2023-10-11 DIAGNOSIS — E66.9 OBESITY (BMI 30-39.9): ICD-10-CM

## 2023-10-11 DIAGNOSIS — M47.816 LUMBAR SPONDYLOSIS: ICD-10-CM

## 2023-10-11 DIAGNOSIS — S22.080G COMPRESSION FRACTURE OF T12 VERTEBRA WITH DELAYED HEALING: ICD-10-CM

## 2023-10-11 DIAGNOSIS — Z13.31 POSITIVE DEPRESSION SCREENING: ICD-10-CM

## 2023-10-11 DIAGNOSIS — M51.36 LUMBAR DEGENERATIVE DISC DISEASE: ICD-10-CM

## 2023-10-11 DIAGNOSIS — M54.50 CHRONIC LEFT-SIDED LOW BACK PAIN WITHOUT SCIATICA: ICD-10-CM

## 2023-10-11 DIAGNOSIS — G89.29 CHRONIC LEFT-SIDED LOW BACK PAIN WITHOUT SCIATICA: ICD-10-CM

## 2023-10-11 PROCEDURE — 3078F DIAST BP <80 MM HG: CPT | Performed by: PHYSICAL MEDICINE & REHABILITATION

## 2023-10-11 PROCEDURE — 1125F AMNT PAIN NOTED PAIN PRSNT: CPT | Performed by: PHYSICAL MEDICINE & REHABILITATION

## 2023-10-11 PROCEDURE — 99204 OFFICE O/P NEW MOD 45 MIN: CPT | Performed by: PHYSICAL MEDICINE & REHABILITATION

## 2023-10-11 PROCEDURE — 3074F SYST BP LT 130 MM HG: CPT | Performed by: PHYSICAL MEDICINE & REHABILITATION

## 2023-10-11 ASSESSMENT — PATIENT HEALTH QUESTIONNAIRE - PHQ9
5. POOR APPETITE OR OVEREATING: 3 - NEARLY EVERY DAY
SUM OF ALL RESPONSES TO PHQ QUESTIONS 1-9: 22
CLINICAL INTERPRETATION OF PHQ2 SCORE: 5

## 2023-10-11 ASSESSMENT — PAIN SCALES - GENERAL: PAINLEVEL: 7=MODERATE-SEVERE PAIN

## 2023-10-11 ASSESSMENT — FIBROSIS 4 INDEX: FIB4 SCORE: 0.9

## 2023-10-11 NOTE — H&P (VIEW-ONLY)
New patient note    Interventional spine and Pain  Physiatry (physical medicine and  Rehabilitation)     Date of service: See epic    Chief complaint:   Chief Complaint   Patient presents with    Establish Care     Ch L sided LBP        Referring provider: ELIJAH Palacios     HISTORY    HPI: Hortencia Bonilla 53 y.o.  who presents today with Diagnoses of Lumbar spondylosis, Chronic left-sided low back pain without sciatica, Lumbar degenerative disc disease, Compression fracture of T12 vertebra with delayed healing s/p kyphoplasty, Obesity (BMI 30-39.9), and Positive depression screening were pertinent to this visit.    HPI       services were used in the patient's primary language of Yoruba.     Name or Number: [facility] [id number]  Mode of interpretation: iPad    Content of Interpretation:    Chronic left-sided axial low back pain nonradiating worse with lumbar extension and with lumbar flexion.  Worse with walking and standing.  Aching in quality.  Constant.  8 out of 10 intensity.  Present for years.  Gradually worsening.  Denies radiating pain.  Denies numbness tingling or weakness.    The patient is gone to physical therapy in the past.  This includes the past 3 months.  She has had a provider driven home exercise program.    Medications tried include muscle relaxers with Zanaflex and NSAIDs with meloxicam.         Medical records review:  I reviewed the note from the referring provider Kimmie Snyder,*          ROS:   Red Flags ROS:   Fever, Chills, Sweats: Denies  Involuntary Weight Loss: Denies  Bladder Incontinence: Denies  Bowel Incontinence: denies  Saddle Anesthesia: Denies    All other systems reviewed and negative.       PMHx:   Past Medical History:   Diagnosis Date    Bipolar disorder (HCC)     Thyroid nodule          Current Outpatient Medications on File Prior to Visit   Medication Sig Dispense Refill    meloxicam (MOBIC) 15 MG tablet Take 1 Tablet by mouth  every day. 90 Tablet 1    tizanidine (ZANAFLEX) 4 MG Tab Take 1 Tablet by mouth every 6 hours as needed (moderate to severe pain, muscle spasm). 30 Tablet 3    Calcium Carbonate (CALCIUM 500 PO) Take 1 Tablet by mouth every day.      Cyanocobalamin (VITAMIN B12 PO) Take 2 Tabs by mouth every day.      Cholecalciferol (VITAMIN D) 2000 UNIT Tab Take 4,000 Units by mouth every day.      MAGNESIUM PO Take 1-2 Tabs by mouth every day.      Omega-3 Fatty Acids (OMEGA 3 PO) Take  by mouth.       No current facility-administered medications on file prior to visit.        PSHx:   Past Surgical History:   Procedure Laterality Date    PRIMARY C SECTION      x 1       Family history   Family History   Problem Relation Age of Onset    Colorectal Cancer Mother     Esophageal Cancer Mother     Other Brother         OCD ([place]) half brother    Cancer Maternal Aunt         unsure    Cancer Maternal Grandmother         unsure of type    Drug abuse Daughter     Psychiatric Illness Daughter     Diabetes Neg Hx     Hyperlipidemia Neg Hx     Hypertension Neg Hx     Heart Disease Neg Hx     Stroke Neg Hx          Medications: reviewed on epic.   Outpatient Medications Marked as Taking for the 10/11/23 encounter (Office Visit) with Miles Mahan M.D.   Medication Sig Dispense Refill    meloxicam (MOBIC) 15 MG tablet Take 1 Tablet by mouth every day. 90 Tablet 1    tizanidine (ZANAFLEX) 4 MG Tab Take 1 Tablet by mouth every 6 hours as needed (moderate to severe pain, muscle spasm). 30 Tablet 3    Calcium Carbonate (CALCIUM 500 PO) Take 1 Tablet by mouth every day.      Cyanocobalamin (VITAMIN B12 PO) Take 2 Tabs by mouth every day.      Cholecalciferol (VITAMIN D) 2000 UNIT Tab Take 4,000 Units by mouth every day.      MAGNESIUM PO Take 1-2 Tabs by mouth every day.      Omega-3 Fatty Acids (OMEGA 3 PO) Take  by mouth.          Allergies:   Allergies   Allergen Reactions    Bloodless        Social Hx:   Social History     Socioeconomic  "History    Marital status:      Spouse name: Not on file    Number of children: Not on file    Years of education: Not on file    Highest education level: Not on file   Occupational History    Not on file   Tobacco Use    Smoking status: Never    Smokeless tobacco: Never   Vaping Use    Vaping Use: Never used   Substance and Sexual Activity    Alcohol use: No    Drug use: No    Sexual activity: Yes     Partners: Male   Other Topics Concern    Not on file   Social History Narrative    Not on file     Social Determinants of Health     Financial Resource Strain: Not on file   Food Insecurity: Not on file   Transportation Needs: Not on file   Physical Activity: Not on file   Stress: Not on file   Social Connections: Not on file   Intimate Partner Violence: Not on file   Housing Stability: Not on file         EXAMINATION     Physical Exam:   Vitals: /64 (BP Location: Right arm, Patient Position: Sitting, BP Cuff Size: Large adult)   Pulse 81   Temp 36 °C (96.8 °F) (Temporal)   Ht 1.727 m (5' 8\")   Wt 97.1 kg (214 lb 1.1 oz)   SpO2 95%     Constitutional:   Body Habitus: Body mass index is 32.55 kg/m².  Cooperation: Fully cooperates with exam  Appearance: Well-groomed, well-nourished, not disheveled     Eyes: No scleral icterus to suggest severe liver disease, no proptosis to suggest severe hyperthyroid    ENT -no obvious auditory deficits, no obvious tongue lesions, tongue midline, no facial droop     Skin -no rashes or lesions noted     Respiratory-  breathing comfortable on room air, no audible wheezing    Cardiovascular- capillary refills less than 2 seconds.     Psychiatric- alert and oriented ×3. Normal affect.       Musculoskeletal and Neuro -           Thoracic/Lumbar Spine/Sacral Spine/Hips   Inspection: No evidence of atrophy in bilateral lower extremities throughout     ROM: decreased active range of motion with flexion, lateral flexion, and rotation bilaterally.   There is decreased active " range of motion with lumbar extension with pain.    There is pain with facet loading maneuver (extension rotation) with axial low back pain on the LEFT side(s)    Palpation:   No tenderness to palpation in midline at T1-T12 levels. No tenderness to palpation in the left and right of the midline T1-L5, NEGATIVE for tenderness to palpation to the para-midline region in the lower lumbar levels.  palpation over SI joint: negative bilaterally    palpation in hip or over the gluteus medius tendon insertion: negative bilaterally      Lumbar spine Special tests  Neuro tension  Straight leg test negative bilaterally    Slump test negative bilaterally      HIP  FAIR test negative bilaterally    Range of motion in the RIGHT hip is full  in flexion, extension, abduction, internal rotation, external rotation.  Range of motion in the LEFT hip is full  in flexion, extension, abduction, internal rotation, external rotation.      SI joint tests  Observation patient sits on one buttocks: Negative  SI joint compression negative bilaterally    SI joint distraction negative bilaterally    Thigh thrust test negative bilaterally    CIERRA test negative bilaterally                 Key points for the international standards for neurological classification of spinal cord injury (ISNCSCI) to light touch.     Dermatome R L                                      L2 2 2   L3 2 2   L4 2 2   L5 2 2   S1 2 2   S2 2 2       Motor Exam Lower Extremities    ? Myotome R L   Hip flexion L2 5 5   Knee extension L3 5 5   Ankle dorsiflexion L4 5 5   Toe extension L5 5 5   Ankle plantarflexion S1 5 5         Reflexes  ?  R L                Patella  2+ 2+   Achilles   2+ 2+       Babinski sign negative bilaterally   Clonus of the ankle negative bilaterally       MEDICAL DECISION MAKING    Medical records review: see under HPI section.     DATA    Labs:   Lab Results   Component Value Date/Time    SODIUM 140 10/07/2023 09:54 AM    POTASSIUM 4.4 10/07/2023 09:54  AM    CHLORIDE 105 10/07/2023 09:54 AM    CO2 28 10/07/2023 09:54 AM    ANION 7.0 10/07/2023 09:54 AM    GLUCOSE 93 10/07/2023 09:54 AM    BUN 15 10/07/2023 09:54 AM    CREATININE 0.58 10/07/2023 09:54 AM    CALCIUM 9.7 10/07/2023 09:54 AM    ASTSGOT 20 10/07/2023 09:54 AM    ALTSGPT 28 10/07/2023 09:54 AM    TBILIRUBIN 0.4 10/07/2023 09:54 AM    ALBUMIN 4.4 10/07/2023 09:54 AM    ALBUMIN 4.31 03/02/2023 10:25 AM    TOTPROTEIN 8.0 10/07/2023 09:54 AM    TOTPROTEIN 7.9 03/02/2023 10:25 AM    GLOBULIN 3.6 (H) 10/07/2023 09:54 AM    AGRATIO 1.2 10/07/2023 09:54 AM   ]    Lab Results   Component Value Date/Time    PROTHROMBTM 12.9 04/06/2023 08:18 AM    INR 0.98 04/06/2023 08:18 AM        Lab Results   Component Value Date/Time    WBC 7.8 04/06/2023 08:18 AM    RBC 4.60 04/06/2023 08:18 AM    HEMOGLOBIN 13.7 04/06/2023 08:18 AM    HEMATOCRIT 41.0 04/06/2023 08:18 AM    MCV 89.1 04/06/2023 08:18 AM    MCH 29.8 04/06/2023 08:18 AM    MCHC 33.4 (L) 04/06/2023 08:18 AM    MPV 10.8 04/06/2023 08:18 AM    NEUTSPOLYS 57.10 03/02/2023 10:25 AM    LYMPHOCYTES 34.30 03/02/2023 10:25 AM    MONOCYTES 6.40 03/02/2023 10:25 AM    EOSINOPHILS 1.40 03/02/2023 10:25 AM    BASOPHILS 0.40 03/02/2023 10:25 AM    HYPOCHROMIA 1+ 01/13/2014 10:20 AM        Lab Results   Component Value Date/Time    HBA1C 5.5 10/07/2023 09:54 AM        Imaging:   I personally reviewed following images, these are my reads      MRI lumbar spine 3/23/2023   Facet arthropathy bilaterally worst at L4-5 and L5-S1 left worse than right.    IMAGING radiology reads. I reviewed the following radiology reads     Results for orders placed during the hospital encounter of 12/24/11    MR-BRAIN-WITH & W/O    Impression  1. A focal hypointense signal are noted in the right frontal subcortical white matter.  The differential diagnosis includes calcified granuloma and cavernous angioma.    2. Mild chronic microvascular ischemic disease.    3. No acute intracranial  abnormality.    Results for orders placed in visit on 12/12/13    MR-BRAIN-W/O    Impression  1.  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    2. No additional lesions identified in the brain parenchyma.      3. There are a few scattered punctate areas of of increased T2 signal intensity in the periventricular white matter consistent with areas of ischemia, demyelination, or gliosis.    4. Moderate sized mucosal retention cyst in the left maxillary sinus posteriorly.                 Results for orders placed during the hospital encounter of 10/03/17    MR-LUMBAR SPINE-W/O    Impression  1.  Mild degenerative disc disease in the lower lumbar spine. No discrete disc herniation or isolated nerve root compression.    2.  Tiny annular tear in the posterior disc at L5-S1.    3.  Mild Modic type I endplate changes at L4-5 and in the inferior L3 endplate, possibly resulting in pain.        Results for orders placed during the hospital encounter of 10/03/17    MR-LUMBAR SPINE-W/O    Impression  1.  Mild degenerative disc disease in the lower lumbar spine. No discrete disc herniation or isolated nerve root compression.    2.  Tiny annular tear in the posterior disc at L5-S1.    3.  Mild Modic type I endplate changes at L4-5 and in the inferior L3 endplate, possibly resulting in pain.                                          Results for orders placed in visit on 04/27/17    DX-CHEST-2 VIEWS    Impression  No acute cardiopulmonary process is identified.                      Results for orders placed during the hospital encounter of 10/31/13    DX-LUMBAR SPINE-2 OR 3 VIEWS    Impression  Moderate scoliosis and mild spondylosis.            INTERPRETING LOCATION:  [facility], [id number]   Results for orders placed in visit on 01/09/23    DX-LUMBAR SPINE-4+ VIEWS             Results for orders placed  during the hospital encounter of 10/31/13    DX-THORACIC SPINE-WITH SWIMMERS VIEW    Impression  Moderate spondylosis with DISH.            INTERPRETING LOCATION:  [facility], [id number]   Results for orders placed in visit on 01/09/23    DX-THORACIC SPINE-2 VIEWS            Diagnosis   Visit Diagnoses     ICD-10-CM   1. Lumbar spondylosis  M47.816   2. Chronic left-sided low back pain without sciatica  M54.50    G89.29   3. Lumbar degenerative disc disease  M51.36   4. Compression fracture of T12 vertebra with delayed healing s/p kyphoplasty  S22.080G   5. Obesity (BMI 30-39.9)  E66.9   6. Positive depression screening  Z13.31           ASSESSMENT AND PLAN:  Hortencia Bonilla 53 y.o. female      Hortencia was seen today for establish care.    Diagnoses and all orders for this visit:    Lumbar spondylosis  -     Referral to Physical Medicine Rehab  -     Referral to Physical Medicine Rehab    Chronic left-sided low back pain without sciatica    Lumbar degenerative disc disease    Compression fracture of T12 vertebra with delayed healing s/p kyphoplasty    Obesity (BMI 30-39.9)    Positive depression screening  -     Patient has been identified as having a positive depression screening. Appropriate orders and counseling have been given.          Refresh note  Physical therapy: The patient has completed physical therapy    home exercise program: Continue with home exercise program that is physical therapy and provider driven    Diagnostic workup: As below    Medications:     Continue meloxicam and Zanaflex as needed    Interventional program:   The patient is failed conservative treatments with medication management, home exercise program from the direction of physical therapy/physician including the past 6 months .  I have ordered a LEFT diagnostic medial branch block targeting the L4-5 and L5-S1 facet joints #1 and #2.  Procedure #2 is only to be done if #1 is a positive block.    The risks benefits and  alternatives to this procedure were discussed and the patient wishes to proceed with the procedure. Risks include but are not limited to damage to surrounding structures, infection, bleeding, worsening of pain which can be permanent, weakness which can be permanent. Benefits include pain relief, improved function. Alternatives includes not doing the procedure.   If there are 2 positive blocks I would consider the patient for radiofrequency neurotomy of the previously blocked nerves.        Follow-up: After the above diagnostic studies           Please note that this dictation was created using voice recognition software. I have made every reasonable attempt to correct obvious errors but there may be errors of grammar and content that I may have overlooked prior to finalization of this note.      Miles Mahan MD  Physical Medicine and Rehabilitation  Interventional Spine and Sports Physiatry  [facility]         CC JODI Palacios.

## 2023-10-11 NOTE — PROGRESS NOTES
New patient note    Interventional spine and Pain  Physiatry (physical medicine and  Rehabilitation)     Date of service: See epic    Chief complaint:   Chief Complaint   Patient presents with    Establish Care     Ch L sided LBP        Referring provider: ELIJAH Palacios     HISTORY    HPI: Hortencia Bonilla 53 y.o.  who presents today with Diagnoses of Lumbar spondylosis, Chronic left-sided low back pain without sciatica, Lumbar degenerative disc disease, Compression fracture of T12 vertebra with delayed healing s/p kyphoplasty, Obesity (BMI 30-39.9), and Positive depression screening were pertinent to this visit.    HPI       services were used in the patient's primary language of Italian.     Name or Number: [facility] [id number]  Mode of interpretation: iPad    Content of Interpretation:    Chronic left-sided axial low back pain nonradiating worse with lumbar extension and with lumbar flexion.  Worse with walking and standing.  Aching in quality.  Constant.  8 out of 10 intensity.  Present for years.  Gradually worsening.  Denies radiating pain.  Denies numbness tingling or weakness.    The patient is gone to physical therapy in the past.  This includes the past 3 months.  She has had a provider driven home exercise program.    Medications tried include muscle relaxers with Zanaflex and NSAIDs with meloxicam.         Medical records review:  I reviewed the note from the referring provider Kimmie Snyder,*          ROS:   Red Flags ROS:   Fever, Chills, Sweats: Denies  Involuntary Weight Loss: Denies  Bladder Incontinence: Denies  Bowel Incontinence: denies  Saddle Anesthesia: Denies    All other systems reviewed and negative.       PMHx:   Past Medical History:   Diagnosis Date    Bipolar disorder (HCC)     Thyroid nodule          Current Outpatient Medications on File Prior to Visit   Medication Sig Dispense Refill    meloxicam (MOBIC) 15 MG tablet Take 1 Tablet by mouth  every day. 90 Tablet 1    tizanidine (ZANAFLEX) 4 MG Tab Take 1 Tablet by mouth every 6 hours as needed (moderate to severe pain, muscle spasm). 30 Tablet 3    Calcium Carbonate (CALCIUM 500 PO) Take 1 Tablet by mouth every day.      Cyanocobalamin (VITAMIN B12 PO) Take 2 Tabs by mouth every day.      Cholecalciferol (VITAMIN D) 2000 UNIT Tab Take 4,000 Units by mouth every day.      MAGNESIUM PO Take 1-2 Tabs by mouth every day.      Omega-3 Fatty Acids (OMEGA 3 PO) Take  by mouth.       No current facility-administered medications on file prior to visit.        PSHx:   Past Surgical History:   Procedure Laterality Date    PRIMARY C SECTION      x 1       Family history   Family History   Problem Relation Age of Onset    Colorectal Cancer Mother     Esophageal Cancer Mother     Other Brother         OCD ([place]) half brother    Cancer Maternal Aunt         unsure    Cancer Maternal Grandmother         unsure of type    Drug abuse Daughter     Psychiatric Illness Daughter     Diabetes Neg Hx     Hyperlipidemia Neg Hx     Hypertension Neg Hx     Heart Disease Neg Hx     Stroke Neg Hx          Medications: reviewed on epic.   Outpatient Medications Marked as Taking for the 10/11/23 encounter (Office Visit) with Miles Mahan M.D.   Medication Sig Dispense Refill    meloxicam (MOBIC) 15 MG tablet Take 1 Tablet by mouth every day. 90 Tablet 1    tizanidine (ZANAFLEX) 4 MG Tab Take 1 Tablet by mouth every 6 hours as needed (moderate to severe pain, muscle spasm). 30 Tablet 3    Calcium Carbonate (CALCIUM 500 PO) Take 1 Tablet by mouth every day.      Cyanocobalamin (VITAMIN B12 PO) Take 2 Tabs by mouth every day.      Cholecalciferol (VITAMIN D) 2000 UNIT Tab Take 4,000 Units by mouth every day.      MAGNESIUM PO Take 1-2 Tabs by mouth every day.      Omega-3 Fatty Acids (OMEGA 3 PO) Take  by mouth.          Allergies:   Allergies   Allergen Reactions    Bloodless        Social Hx:   Social History     Socioeconomic  "History    Marital status:      Spouse name: Not on file    Number of children: Not on file    Years of education: Not on file    Highest education level: Not on file   Occupational History    Not on file   Tobacco Use    Smoking status: Never    Smokeless tobacco: Never   Vaping Use    Vaping Use: Never used   Substance and Sexual Activity    Alcohol use: No    Drug use: No    Sexual activity: Yes     Partners: Male   Other Topics Concern    Not on file   Social History Narrative    Not on file     Social Determinants of Health     Financial Resource Strain: Not on file   Food Insecurity: Not on file   Transportation Needs: Not on file   Physical Activity: Not on file   Stress: Not on file   Social Connections: Not on file   Intimate Partner Violence: Not on file   Housing Stability: Not on file         EXAMINATION     Physical Exam:   Vitals: /64 (BP Location: Right arm, Patient Position: Sitting, BP Cuff Size: Large adult)   Pulse 81   Temp 36 °C (96.8 °F) (Temporal)   Ht 1.727 m (5' 8\")   Wt 97.1 kg (214 lb 1.1 oz)   SpO2 95%     Constitutional:   Body Habitus: Body mass index is 32.55 kg/m².  Cooperation: Fully cooperates with exam  Appearance: Well-groomed, well-nourished, not disheveled     Eyes: No scleral icterus to suggest severe liver disease, no proptosis to suggest severe hyperthyroid    ENT -no obvious auditory deficits, no obvious tongue lesions, tongue midline, no facial droop     Skin -no rashes or lesions noted     Respiratory-  breathing comfortable on room air, no audible wheezing    Cardiovascular- capillary refills less than 2 seconds.     Psychiatric- alert and oriented ×3. Normal affect.       Musculoskeletal and Neuro -           Thoracic/Lumbar Spine/Sacral Spine/Hips   Inspection: No evidence of atrophy in bilateral lower extremities throughout     ROM: decreased active range of motion with flexion, lateral flexion, and rotation bilaterally.   There is decreased active " range of motion with lumbar extension with pain.    There is pain with facet loading maneuver (extension rotation) with axial low back pain on the LEFT side(s)    Palpation:   No tenderness to palpation in midline at T1-T12 levels. No tenderness to palpation in the left and right of the midline T1-L5, NEGATIVE for tenderness to palpation to the para-midline region in the lower lumbar levels.  palpation over SI joint: negative bilaterally    palpation in hip or over the gluteus medius tendon insertion: negative bilaterally      Lumbar spine Special tests  Neuro tension  Straight leg test negative bilaterally    Slump test negative bilaterally      HIP  FAIR test negative bilaterally    Range of motion in the RIGHT hip is full  in flexion, extension, abduction, internal rotation, external rotation.  Range of motion in the LEFT hip is full  in flexion, extension, abduction, internal rotation, external rotation.      SI joint tests  Observation patient sits on one buttocks: Negative  SI joint compression negative bilaterally    SI joint distraction negative bilaterally    Thigh thrust test negative bilaterally    CIRERA test negative bilaterally                 Key points for the international standards for neurological classification of spinal cord injury (ISNCSCI) to light touch.     Dermatome R L                                      L2 2 2   L3 2 2   L4 2 2   L5 2 2   S1 2 2   S2 2 2       Motor Exam Lower Extremities    ? Myotome R L   Hip flexion L2 5 5   Knee extension L3 5 5   Ankle dorsiflexion L4 5 5   Toe extension L5 5 5   Ankle plantarflexion S1 5 5         Reflexes  ?  R L                Patella  2+ 2+   Achilles   2+ 2+       Babinski sign negative bilaterally   Clonus of the ankle negative bilaterally       MEDICAL DECISION MAKING    Medical records review: see under HPI section.     DATA    Labs:   Lab Results   Component Value Date/Time    SODIUM 140 10/07/2023 09:54 AM    POTASSIUM 4.4 10/07/2023 09:54  AM    CHLORIDE 105 10/07/2023 09:54 AM    CO2 28 10/07/2023 09:54 AM    ANION 7.0 10/07/2023 09:54 AM    GLUCOSE 93 10/07/2023 09:54 AM    BUN 15 10/07/2023 09:54 AM    CREATININE 0.58 10/07/2023 09:54 AM    CALCIUM 9.7 10/07/2023 09:54 AM    ASTSGOT 20 10/07/2023 09:54 AM    ALTSGPT 28 10/07/2023 09:54 AM    TBILIRUBIN 0.4 10/07/2023 09:54 AM    ALBUMIN 4.4 10/07/2023 09:54 AM    ALBUMIN 4.31 03/02/2023 10:25 AM    TOTPROTEIN 8.0 10/07/2023 09:54 AM    TOTPROTEIN 7.9 03/02/2023 10:25 AM    GLOBULIN 3.6 (H) 10/07/2023 09:54 AM    AGRATIO 1.2 10/07/2023 09:54 AM   ]    Lab Results   Component Value Date/Time    PROTHROMBTM 12.9 04/06/2023 08:18 AM    INR 0.98 04/06/2023 08:18 AM        Lab Results   Component Value Date/Time    WBC 7.8 04/06/2023 08:18 AM    RBC 4.60 04/06/2023 08:18 AM    HEMOGLOBIN 13.7 04/06/2023 08:18 AM    HEMATOCRIT 41.0 04/06/2023 08:18 AM    MCV 89.1 04/06/2023 08:18 AM    MCH 29.8 04/06/2023 08:18 AM    MCHC 33.4 (L) 04/06/2023 08:18 AM    MPV 10.8 04/06/2023 08:18 AM    NEUTSPOLYS 57.10 03/02/2023 10:25 AM    LYMPHOCYTES 34.30 03/02/2023 10:25 AM    MONOCYTES 6.40 03/02/2023 10:25 AM    EOSINOPHILS 1.40 03/02/2023 10:25 AM    BASOPHILS 0.40 03/02/2023 10:25 AM    HYPOCHROMIA 1+ 01/13/2014 10:20 AM        Lab Results   Component Value Date/Time    HBA1C 5.5 10/07/2023 09:54 AM        Imaging:   I personally reviewed following images, these are my reads      MRI lumbar spine 3/23/2023   Facet arthropathy bilaterally worst at L4-5 and L5-S1 left worse than right.    IMAGING radiology reads. I reviewed the following radiology reads     Results for orders placed during the hospital encounter of 12/24/11    MR-BRAIN-WITH & W/O    Impression  1. A focal hypointense signal are noted in the right frontal subcortical white matter.  The differential diagnosis includes calcified granuloma and cavernous angioma.    2. Mild chronic microvascular ischemic disease.    3. No acute intracranial  abnormality.    Results for orders placed in visit on 12/12/13    MR-BRAIN-W/O    Impression  1.  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    2. No additional lesions identified in the brain parenchyma.      3. There are a few scattered punctate areas of of increased T2 signal intensity in the periventricular white matter consistent with areas of ischemia, demyelination, or gliosis.    4. Moderate sized mucosal retention cyst in the left maxillary sinus posteriorly.                 Results for orders placed during the hospital encounter of 10/03/17    MR-LUMBAR SPINE-W/O    Impression  1.  Mild degenerative disc disease in the lower lumbar spine. No discrete disc herniation or isolated nerve root compression.    2.  Tiny annular tear in the posterior disc at L5-S1.    3.  Mild Modic type I endplate changes at L4-5 and in the inferior L3 endplate, possibly resulting in pain.        Results for orders placed during the hospital encounter of 10/03/17    MR-LUMBAR SPINE-W/O    Impression  1.  Mild degenerative disc disease in the lower lumbar spine. No discrete disc herniation or isolated nerve root compression.    2.  Tiny annular tear in the posterior disc at L5-S1.    3.  Mild Modic type I endplate changes at L4-5 and in the inferior L3 endplate, possibly resulting in pain.                                          Results for orders placed in visit on 04/27/17    DX-CHEST-2 VIEWS    Impression  No acute cardiopulmonary process is identified.                      Results for orders placed during the hospital encounter of 10/31/13    DX-LUMBAR SPINE-2 OR 3 VIEWS    Impression  Moderate scoliosis and mild spondylosis.            INTERPRETING LOCATION:  [facility], [id number]   Results for orders placed in visit on 01/09/23    DX-LUMBAR SPINE-4+ VIEWS             Results for orders placed  during the hospital encounter of 10/31/13    DX-THORACIC SPINE-WITH SWIMMERS VIEW    Impression  Moderate spondylosis with DISH.            INTERPRETING LOCATION:  [facility], [id number]   Results for orders placed in visit on 01/09/23    DX-THORACIC SPINE-2 VIEWS            Diagnosis   Visit Diagnoses     ICD-10-CM   1. Lumbar spondylosis  M47.816   2. Chronic left-sided low back pain without sciatica  M54.50    G89.29   3. Lumbar degenerative disc disease  M51.36   4. Compression fracture of T12 vertebra with delayed healing s/p kyphoplasty  S22.080G   5. Obesity (BMI 30-39.9)  E66.9   6. Positive depression screening  Z13.31           ASSESSMENT AND PLAN:  Hortencia Bonilla 53 y.o. female      Hortencia was seen today for establish care.    Diagnoses and all orders for this visit:    Lumbar spondylosis  -     Referral to Physical Medicine Rehab  -     Referral to Physical Medicine Rehab    Chronic left-sided low back pain without sciatica    Lumbar degenerative disc disease    Compression fracture of T12 vertebra with delayed healing s/p kyphoplasty    Obesity (BMI 30-39.9)    Positive depression screening  -     Patient has been identified as having a positive depression screening. Appropriate orders and counseling have been given.          Refresh note  Physical therapy: The patient has completed physical therapy    home exercise program: Continue with home exercise program that is physical therapy and provider driven    Diagnostic workup: As below    Medications:     Continue meloxicam and Zanaflex as needed    Interventional program:   The patient is failed conservative treatments with medication management, home exercise program from the direction of physical therapy/physician including the past 6 months .  I have ordered a LEFT diagnostic medial branch block targeting the L4-5 and L5-S1 facet joints #1 and #2.  Procedure #2 is only to be done if #1 is a positive block.    The risks benefits and  alternatives to this procedure were discussed and the patient wishes to proceed with the procedure. Risks include but are not limited to damage to surrounding structures, infection, bleeding, worsening of pain which can be permanent, weakness which can be permanent. Benefits include pain relief, improved function. Alternatives includes not doing the procedure.   If there are 2 positive blocks I would consider the patient for radiofrequency neurotomy of the previously blocked nerves.        Follow-up: After the above diagnostic studies           Please note that this dictation was created using voice recognition software. I have made every reasonable attempt to correct obvious errors but there may be errors of grammar and content that I may have overlooked prior to finalization of this note.      Miles Mahan MD  Physical Medicine and Rehabilitation  Interventional Spine and Sports Physiatry  [facility]         CC JODI Palacios.

## 2023-10-24 NOTE — LETTER
Office Cardiology Progress Note  Tian Mora 78 y.o. male MRN: [id number]  10/24/23  2:11 PM      ASSESSMENT:    1. Control of recurrent morning hypotension and associated dizziness and background of labile essential hypertension with resolved nocturnal surges in systolic blood pressure at 9-11 PM, previously in the 398 systolic range. Has “white coat” hypertension. Previously with frequent hold of blood pressure medication in the mornings for systolic blood pressures of 120 or less previously being done by patient. Average home blood pressure previously from  12/12 through  12/15/2020 was  127/62  and from 06/06/2021 through 06/10/2021 was 128/61. Previous average home blood pressure 128/62 between 6/11 and 06/14/2022 and previously was 126/61 between 12/11 and 12/14/2021. Current average home blood pressure from 6/2/2023 through 6/5/2023 was 116/61 with previous average blood pressure 12/9 through 12/12/2022 was 121/58. Remote average home blood pressure taken from 12/09 through 12/12/2019 was 115/50.  2. Asymptomatic chronic PACs and PVCs, per patient history, and  present on previous exam and EKG. Frequent PACs with bigeminy and trigeminy and sinus arrhythmia on EKG in the past.  Also has currently suppressed apical systolic ejection murmur, consistent with mild aortic valve stenosis documented on previous echocardiogram 01/08/2020, which also showed 1+ MR/TR, mild MAC, 60% LVEF. Previous Holter monitor on 05/19/2022 showed moderately frequent PACs and PVCs, PACs being more frequent, with nonsustained atrial runs to a maximum of 12 beats but no atrial fibrillation or flutter. 3. History of carvedilol-induced syncope from hypotension on 1/10/17 and 1/20/17 with episode on 9/11/17 from low blood pressure, cause uncertain. 4. Stable right calf claudication and bilateral right more than left lower extremity PAD present for 10.4 years.   Last lower arterial duplex scan 9/20/2023 showed right CHU [facility]  [address]  [facility] [address]     April 17, 2023    Patient: Hortencia Bonilla   YOB: 1970   Date of Visit: 4/17/2023       To Whom It May Concern:    Hortencia Bonilla was seen and treated in our department on 4/17/2023.   She has a biopsy scheduled 4/18/23 with follow up appointment scheduled 8-9:30 Monday 4/24/23 with YUE Sinha.    Sincerely,         JODI Palacios.                 of 0.29,  decreased from prior level of 0.32 on 3/24/2023 and left CHU of 0.91, increased from 0.71 indicating worsening PAD. Does have right leg claudication with walking. Has less frequent recent onset of right dorsum foot pain occurring while trying to sleep in bed for several weeks. Has poorly healing right great toe wound. 5.  History of previously heard grade 1/4 right neck bruit with less than 50% JOVANNI and 50-69% left ICA stenoses on 09/20/2023 carotid duplex, unchanged from prior scan. 6. Status post repair of right hydrocele on 10/25/2018 and history of chronic incomplete torsion of left testicle with benign cyst adjacent to left testes. 7. Treated dyslipidemia, extremely well-controlled as of 12/6/2022.    8. Stable chronic BPH. 9. Remote history of syncope after Flomax. 10. Chronic nocturnal GERD with water brash. 11. Multiple basal cell skin cancers with previous excisions from scalp, and previously from right forehead, right cheek, left ear, left wrist, dorsum of left hand dating back to 04/15/2018. 12. Nonhealing ulceration above right medial ankle for approximately 4.9 years, likely venostasis ulcer. Poorly healing lesion of right great toe. 13.  Worsened edema of bilateral lower extremities from venous insufficiency of left more than right lower extremity, with right lower extremity venous insufficiency previously confirmed on venous duplex scan dated 06/21/2019, patient currently nonadherent to compression hosiery and frequent elevation. 14.  Stable laryngopharyngeal reflux (LPR), being followed by Dr. Selvin Finney. 15.  Severe bilateral PAD, right much worse than left. 16.  Recent atypical chest pain.     Plan       Patient Instructions     We have ordered and will help you schedule a Lexiscan nuclear stress study to be done at [address] [place] [address] you wear your compression hosiery on a daily basis and elevate your legs as much as possible when you are not up and about. This will help with the leg swelling. Continue present medication. We will give you a call after we get the report on the [address] [place] [address] S nuclear stress study with the results and further recommendations. If you do not hear from us within a week of having the nuclear stress test, give us a call. Routine cardiology follow-up approximately 6 months with EKG. HPI    This 78 y.o. male  denies new cardiopulmonary  symptoms. The patient complains of a slowly healing lesion on the the right great toe. He has a stable level of right calf claudication after walking about 100 feet. He has occasional resting pain in the dorsum of his right foot. The patient's recent blood pressures have been fairly well controlled. The patient has not been wearing support hosiery or doing leg elevation on a consistent basis. The patient has had some episodes of unprovoked chest discomfort. The patient is also being seen in follow-up of the below listed diagnoses. Encounter Diagnoses   Name Primary?     Atherosclerosis of native arteries of extremities with rest pain, right leg (HCC) Yes    Atypical chest pain     Dyslipidemia     Atherosclerotic PVD with intermittent claudication (HCC)     Right foot pain     Essential hypertension     Premature atrial contractions         Review of Systems    All other systems negative, except as noted in history of present illness    Historical Information   Past Medical History:   Diagnosis Date    Asymptomatic PVCs     last assessed 1/18/18    BPH (benign prostatic hyperplasia)     last assessed 4/11/17     High cholesterol     last assessed 4/11/17     Hypertension     uncontrolled, last assessed 4/11/17     Past Surgical History:   Procedure Laterality Date    CATARACT EXTRACTION      COLONOSCOPY      Complete    MN EXCISION HYDROCELE UNILATERAL Right 10/25/2018    Procedure: HYDROCELECTOMY;  Surgeon: Aurelia Ovalles MD;  Location: [facility];  Service: Urology TESTICLE SURGERY      last assessed 3/31/15     Social History     Substance and Sexual Activity   Alcohol Use No     Social History     Substance and Sexual Activity   Drug Use No     Social History     Tobacco Use   Smoking Status Former    Years: 20.00    Types: Cigarettes    Quit date: 10/18/1965    Years since quittin.0   Smokeless Tobacco Never       Family History:  Family History   Problem Relation Age of Onset    Heart disease Mother     Hypertension Mother     Heart disease Father     Hypertension Father     Hypertension Family     Hyperlipidemia Family          Meds/Allergies     Prior to Admission medications    Medication Sig Start Date End Date Taking? Authorizing Provider   amLODIPine (NORVASC) 2.5 mg tablet Take 1 tablet (2.5 mg total) by mouth daily In the evening. 23  Yes Jay Hercules MD   ammonium lactate (LAC-HYDRIN) 12 % cream Apply topically as needed for dry skin 10/30/20  Yes Argentina Escobedo DPM   aspirin 81 MG tablet Take 81 mg by mouth daily   Yes Historical Provider, MD   atorvastatin (LIPITOR) 10 mg tablet TAKE ONE TABLET BY MOUTH EVERY DAY 22  Yes Thee Banda MD   Calcium Carbonate-Vitamin D 500-125 MG-UNIT TABS Take by mouth   Yes Historical Provider, MD   chlorthalidone 25 mg tablet Take 1 tablet (25 mg total) by mouth every other day 22  Yes Jay Hercules MD   Cholecalciferol (Vitamin D) 50 MCG (2000 UT) CAPS Take 1 capsule (2,000 Units total) by mouth in the morning 6/15/22  Yes Jay Hercules MD   finasteride (PROSCAR) 5 mg tablet Take 1 tablet (5 mg total) by mouth daily at bedtime 23  Yes Thee Banda MD   lisinopril (ZESTRIL) 5 mg tablet Take 1 tablet in morning and 1 tablet in the evening.  10/4/23  Yes Jay Hercules MD   Multiple Vitamins-Minerals (Ocuvite Adult Formula) CAPS Take 1 capsule by mouth in the morning 6/15/22  Yes Jay Hercules MD   triamcinolone (KENALOG) 0.025 % ointment Apply topically 2 (two) times a day 23  Yes Terrell Hayden MD   clotrimazole (LOTRIMIN) 1 % cream Apply topically 2 (two) times a day  Patient not taking: Reported on 6/16/2023 9/29/22 10/24/23  Terrell Hayden MD       Allergies   Allergen Reactions    Tamsulosin Syncope         Vitals:    10/24/23 1249   BP: 120/60   BP Location: Right arm   Patient Position: Sitting   Cuff Size: Standard   Pulse: 71   SpO2: 99%   Weight: 72.6 kg (160 lb)   Height: 5' 11" (1.803 m)       Body mass index is 22.32 kg/m². 3 pound weight loss in approximately 4-1/2+ months and 6 pound weight loss in approximately 10-1/2+ months    Physical Exam:    General Appearance:  Alert, cooperative, no distress, appears stated age   Head:  Normocephalic, without obvious abnormality, atraumatic   Eyes:  PERRL, conjunctiva/corneas clear, EOM's intact,   both eyes   Ears:  Normal TM's and external ear canals, both ears   Nose: Nares normal, septum midline, mucosa normal, no drainage or sinus tenderness   Throat: Lips, mucosa, and tongue normal; teeth and gums normal   Neck: Supple, symmetrical, trachea midline, no adenopathy, thyroid: not enlarged, symmetric, no tenderness/mass/nodules, no carotid bruit or JVD   Back:   Symmetric, no curvature, ROM normal, no CVA tenderness   Lungs:   Clear to auscultation bilaterally, respirations unlabored   Chest Wall:  No tenderness or deformity   Heart:  Slightly irregular cardiac rhythm, S1, S2 normal, no murmur, rub or gallop   Abdomen:   Soft, non-tender, bowel sounds active all four quadrants,  no masses, no organomegaly   Extremities: Extremities atraumatic, no cyanosis with 2-3+ pitting bilateral pretibial, ankle and pedal edema. There are bandages on the right great toe. Pulses: Nonpalpable bilateral ankle and pedal pulses with both feet equally warm.    Skin: Skin showed normal color, texture, turgor and no rashes or lesions   Lymph nodes: Cervical, supraclavicular, and axillary nodes normal   Neurologic: Normal Cardiographics    ECG 10/24/23:    Normal sinus rhythm and sinus arrhythmia at heart rate of 71 bpm  Otherwise normal ECG  Suppression of PACs compared to 6/6/2023 with no other changes. IMAGING:    No Chest XR results available for this patient. CTA of abdomen 10/17/2023:    Right lower extremity:    Calcified mid and distal right common femoral artery with occlusion and questionable segments of distal reconstitution  Proximal right superficial femoral artery occluded at origin with distal reconstitution. The right SFA has multifocal moderate and high-grade stenoses and questionable occlusions in the mid and distal artery  Multiple areas of mild and mild to moderate stenosis throughout the right popliteal artery  Patent right tibioperoneal trunk, posterior tibial and peroneal arteries occluded mid and distal anterior tibial artery with distal reconstitution    Left lower extremity:    High-grade stenosis at origin of left internal iliac artery with patent left external iliac artery and left common femoral artery moderate stenosis at origin of profunda femoral artery on the left  Moderate and high-grade stenoses throughout the mid and distal left superficial femoral artery  Multifocal high-grade and moderate stenoses of left popliteal artery  Patent left tibioperoneal trunk with multifocal short segment high-grade stenoses or occlusion of distal anterior tibial artery  Patent left peroneal artery and posterior tibial artery    Lower extremity arterial duplex scan 9/20/2023: Moderate 50-75% stenosis of distal right common iliac artery  Severe stenosis or total occlusion in right common femoral artery with reconstitution in proximal right SFA  Severe stenosis or total occlusion in distal right SFA with recanalization and proximal right popliteal artery  Right CHU 0.29, in the rest pain or tissue loss range. Prior level was 0.32.     Diffuse disease in the left femoral and popliteal arteries with 50-75% stenoses in left mid SFA and popliteal arteries  Less than 50% stenoses in left profundofemoral and distal superficial femoral arteries  Left CHU 0.91, previously 0.71. Dampened bilateral PVR/PPG tracings  Metatarsal and great toe pressures of 0 mmHg on the right, below healing range  Metatarsal and great toe pressures of 117 and 45 mmHg on the left, within normal healing range    Compared to previous study 3/24/2023, there is definite progression of disease in the right lower extremity.     Carotid duplex scan 9/20/2023:    Less than 50% stenosis of right internal carotid artery  50-69% stenosis in left internal carotid artery  Normal bilateral vertebral and subclavian hemodynamics  No significant change from 1/8/2020          LAB REVIEW:      Lab Results   Component Value Date    SODIUM 139 10/11/2023    K 3.8 10/11/2023     10/11/2023    CO2 28 10/11/2023    BUN 27 (H) 10/11/2023    CREATININE 0.83 10/11/2023    GLUF 97 10/11/2023    CALCIUM 9.7 10/11/2023    CORRECTEDCA 10.3 (H) 12/06/2022    AST 20 05/12/2023    ALT 22 05/12/2023    ALKPHOS 69 05/12/2023    EGFR 75 10/11/2023     Lab Results   Component Value Date    CHOLESTEROL 153 12/06/2022    CHOLESTEROL 154 11/30/2021    CHOLESTEROL 149 12/09/2020     Lab Results   Component Value Date    HDL 77 12/06/2022    HDL 78 11/30/2021    HDL 81 12/09/2020       Lab Results   Component Value Date    LDLCALC 61 12/06/2022    LDLCALC 64 11/30/2021    LDLCALC 56 12/09/2020     No components found for: "DIRECTLDLREFLEX"  Lab Results   Component Value Date    TRIG 74 12/06/2022    TRIG 61 11/30/2021    TRIG 58 12/09/2020               Jay Hercules MD

## 2023-10-26 ENCOUNTER — HOSPITAL ENCOUNTER (OUTPATIENT)
Facility: REHABILITATION | Age: 53
End: 2023-10-26
Attending: PHYSICAL MEDICINE & REHABILITATION | Admitting: PHYSICAL MEDICINE & REHABILITATION
Payer: COMMERCIAL

## 2023-10-26 ENCOUNTER — APPOINTMENT (OUTPATIENT)
Dept: RADIOLOGY | Facility: REHABILITATION | Age: 53
End: 2023-10-26
Attending: PHYSICAL MEDICINE & REHABILITATION
Payer: COMMERCIAL

## 2023-10-26 VITALS
WEIGHT: 212.52 LBS | HEART RATE: 68 BPM | SYSTOLIC BLOOD PRESSURE: 106 MMHG | TEMPERATURE: 97.9 F | BODY MASS INDEX: 32.21 KG/M2 | DIASTOLIC BLOOD PRESSURE: 76 MMHG | HEIGHT: 68 IN | OXYGEN SATURATION: 95 % | RESPIRATION RATE: 16 BRPM

## 2023-10-26 PROCEDURE — 64494 INJ PARAVERT F JNT L/S 2 LEV: CPT

## 2023-10-26 PROCEDURE — 700101 HCHG RX REV CODE 250

## 2023-10-26 PROCEDURE — 700111 HCHG RX REV CODE 636 W/ 250 OVERRIDE (IP)

## 2023-10-26 PROCEDURE — 64493 INJ PARAVERT F JNT L/S 1 LEV: CPT

## 2023-10-26 PROCEDURE — 700117 HCHG RX CONTRAST REV CODE 255

## 2023-10-26 PROCEDURE — 99152 MOD SED SAME PHYS/QHP 5/>YRS: CPT

## 2023-10-26 RX ORDER — LIDOCAINE HYDROCHLORIDE 20 MG/ML
INJECTION, SOLUTION EPIDURAL; INFILTRATION; INTRACAUDAL; PERINEURAL
Status: COMPLETED
Start: 2023-10-26 | End: 2023-10-26

## 2023-10-26 RX ORDER — MIDAZOLAM HYDROCHLORIDE 1 MG/ML
INJECTION INTRAMUSCULAR; INTRAVENOUS
Status: COMPLETED
Start: 2023-10-26 | End: 2023-10-26

## 2023-10-26 RX ORDER — LIDOCAINE HYDROCHLORIDE 10 MG/ML
INJECTION, SOLUTION EPIDURAL; INFILTRATION; INTRACAUDAL; PERINEURAL
Status: COMPLETED
Start: 2023-10-26 | End: 2023-10-26

## 2023-10-26 RX ADMIN — IOHEXOL 5 ML: 240 INJECTION, SOLUTION INTRATHECAL; INTRAVASCULAR; INTRAVENOUS; ORAL at 09:38

## 2023-10-26 RX ADMIN — MIDAZOLAM 1 MG: 1 INJECTION, SOLUTION INTRAMUSCULAR; INTRAVENOUS at 09:30

## 2023-10-26 RX ADMIN — LIDOCAINE HYDROCHLORIDE 10 ML: 10 INJECTION, SOLUTION EPIDURAL; INFILTRATION; INTRACAUDAL at 09:37

## 2023-10-26 RX ADMIN — LIDOCAINE HYDROCHLORIDE 10 ML: 20 INJECTION, SOLUTION EPIDURAL; INFILTRATION; INTRACAUDAL at 09:38

## 2023-10-26 ASSESSMENT — FIBROSIS 4 INDEX: FIB4 SCORE: 0.9

## 2023-10-26 ASSESSMENT — PAIN DESCRIPTION - PAIN TYPE
TYPE: CHRONIC PAIN

## 2023-10-26 NOTE — OR SURGEON
Immediate Post OP Note    Pre-Op Diagnosis Codes:     * Lumbar spondylosis [M47.816]      Post-Op Diagnosis Codes:     * Lumbar spondylosis [M47.816]      Procedure(s):  Diagnostic medial branch blocks targeting the LEFT L4-5 and L5-S1 facet joints with fluoroscopic guidance #1 with sedation.         versed 1mg.  Faroese speaking. Ipad  used Melisa- Wound Class: Clean    Surgeon(s):  Miles Mahan M.D.    Anesthesiologist/Type of Anesthesia:  No anesthesia staff entered./Local    Surgical Staff:  Circulator: Lolis Austin R.N.  Scrub Person: Kurt Ventura C.N.A.  Radiology Technologist: Dev Hood    Specimens removed if any:  * No specimens in log *    Estimated Blood Loss: None    Findings: None    Complications: None        10/26/2023 9:48 AM Miles Mahan M.D.

## 2023-10-26 NOTE — OP REPORT
Date of Service: 10/26/2023     Patient: Hortencia Bonilla 53 y.o. female     MRN: [id number]     Physician/s: Miles Mahan MD    Pre-operative Diagnosis: Lumbosacral spondylosis, facet arthropathy. The patient was NOT seen for lumbar radiculopathy today.     Post-operative Diagnosis: Lumbosacral spondylosis, facet arthropathy. The patient was NOT seen for lumbar radiculopathy today.     Procedure: Medial Branch Blocks targeting the left L4-5 and L5-S1  facet joints with sedation Versed only    Description of procedure:    The risks, benefits, and alternatives of the procedure were reviewed and discussed with the patient.  Written informed consent was freely obtained. A pre-procedural time-out was conducted by the physician verifying patient’s identity, procedure to be performed, procedure site and side, and allergy verification. Appropriate equipment was determined to be in place for the procedure.  Language line Alitalia  Melisa was used for the preoperative, procedure and postoperative time.    Moderation sedation was achieved with Versed (1mg) . Monitoring of the patients vital signs and respiratory status was provided by trained independent registered nurse during the entire course of the procedures and under my supervision and recoded in the patient’s medical record. The duration of sedation was over 10 minutes.     In the fluoroscopy suite the patient was placed in a prone position and the skin was prepped and draped in the usual sterile fashion. The fluoroscope was placed over the lower back at the appropriate angles, and the targets for injection were marked. A 27g needle was placed into each of the markings at the levels below, and approx 1mL of 1% Lidocaine was injected subcutaneously into the epidermal and dermal layers. The needle was removed intact.     Using an oblique view, A 22g 5 inch needle was then placed at the intersection of the transverse process and superior articular process at  the S1 facet where the L5 dorsal ramus runs on the left side. The needle tips were then verified by AP, oblique, and lateral views.     Using an oblique view, A 22g 5 inch needle was then placed at the intersection of the transverse process and superior articular process at the L5-S1 facet joint where the L4 medial branch runs  on the left side. The needle tips were then verified by AP, oblique, and lateral views.     Using an oblique view, A 22g 5 inch needle was then placed at the intersection of the transverse process and superior articular process at the L4-5 facet joint where the L3 medial branch runs  on the left side. The needle tips were then verified by AP, oblique, and lateral views.     In the AP view, less than 1mL of contrast dye was used to highlight the medial branch while the fluoroscope was running live at the levels above. Following negative aspiration, approximately 1mL of 2% lidocaine  was then injected at the above levels, and the needles were removed intact after restyleted. The patient's back was covered with a 4x4 gauze, the area was cleansed with sterile normal saline, and a dressing was applied.     There were no complications noted, the patient was hemodynamically stable, and tolerated the procedure well.     The patient had greater than 50% pain relief of the index pain minutes post procedure.      Preprocedure pain 8/10 NRS  Postprocedure pain 2 /10 NRS      Follow-up as scheduled      Miles Mahan MD  Physical Medicine and Rehabilitation  Interventional Spine and Sports Physiatry  [facility]            CPT  Intraarticular joint or medial branch block (MBB) - lumbar or sacral (1st level):  50118--49-av  Intraarticular joint or medial branch block (MBB) - lumbar or sacral (2nd level):  39742-96-qh  moderate procedural sedation first 15 minutes: 24268-87

## 2023-10-26 NOTE — INTERVAL H&P NOTE
H&P reviewed. The patient was examined and there are no changes to the H&P     Lungs clear to auscultation bilaterally.  No abdominal tenderness.  Heart regular rate and rhythm.  Vitals reviewed.    Proceed with the originally scheduled procedure.  The risks, benefits and alternatives were discussed.  The patient understands these.    Pre-Op Diagnosis Codes:     * Lumbar spondylosis [M47.816]  Procedure(s):  Diagnostic medial branch blocks targeting the LEFT L4-5 and L5-S1 facet joints with fluoroscopic guidance #1 with sedation.        Note: plan on versed 1mg.  Armenian speaking.  Patient examined with  on iPad Melisa Mahan MD  Physical Medicine and Rehabilitation  Interventional Spine and Sports Physiatry  [facility]

## 2023-10-28 ENCOUNTER — HOSPITAL ENCOUNTER (OUTPATIENT)
Dept: RADIOLOGY | Facility: MEDICAL CENTER | Age: 53
End: 2023-10-28
Attending: NURSE PRACTITIONER
Payer: COMMERCIAL

## 2023-10-28 DIAGNOSIS — E04.1 THYROID NODULE: ICD-10-CM

## 2023-10-28 PROCEDURE — 76536 US EXAM OF HEAD AND NECK: CPT

## 2023-10-31 ENCOUNTER — HOSPITAL ENCOUNTER (OUTPATIENT)
Dept: HEMATOLOGY ONCOLOGY | Facility: MEDICAL CENTER | Age: 53
End: 2023-10-31
Attending: NURSE PRACTITIONER
Payer: COMMERCIAL

## 2023-10-31 VITALS
HEART RATE: 79 BPM | OXYGEN SATURATION: 94 % | DIASTOLIC BLOOD PRESSURE: 80 MMHG | SYSTOLIC BLOOD PRESSURE: 106 MMHG | HEIGHT: 68 IN | TEMPERATURE: 97.6 F | BODY MASS INDEX: 32.99 KG/M2 | WEIGHT: 217.7 LBS | RESPIRATION RATE: 12 BRPM

## 2023-10-31 DIAGNOSIS — E04.1 THYROID NODULE: ICD-10-CM

## 2023-10-31 PROCEDURE — 99212 OFFICE O/P EST SF 10 MIN: CPT | Performed by: NURSE PRACTITIONER

## 2023-10-31 PROCEDURE — 99215 OFFICE O/P EST HI 40 MIN: CPT | Performed by: NURSE PRACTITIONER

## 2023-10-31 ASSESSMENT — ENCOUNTER SYMPTOMS
WEIGHT LOSS: 0
BACK PAIN: 1
CHILLS: 0
FEVER: 0

## 2023-10-31 ASSESSMENT — FIBROSIS 4 INDEX: FIB4 SCORE: 0.9

## 2023-10-31 ASSESSMENT — PAIN SCALES - GENERAL: PAINLEVEL: 8=MODERATE-SEVERE PAIN
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
## 2023-11-07 ENCOUNTER — TELEPHONE (OUTPATIENT)
Dept: HEMATOLOGY ONCOLOGY | Facility: MEDICAL CENTER | Age: 53
End: 2023-11-07

## 2023-11-07 ENCOUNTER — HOSPITAL ENCOUNTER (OUTPATIENT)
Dept: RADIOLOGY | Facility: MEDICAL CENTER | Age: 53
End: 2023-11-07
Attending: NURSE PRACTITIONER
Payer: COMMERCIAL

## 2023-11-07 DIAGNOSIS — E04.1 THYROID NODULE: ICD-10-CM

## 2023-11-07 LAB — CYTOLOGY REG CYTOL: NORMAL

## 2023-11-07 PROCEDURE — 88173 CYTOPATH EVAL FNA REPORT: CPT

## 2023-11-07 PROCEDURE — 10006 FNA BX W/US GDN EA ADDL: CPT

## 2023-11-07 NOTE — TELEPHONE ENCOUNTER
Phone Number Called: [phone] (home)     Call outcome:  no vm set up     Message: I called to schedule follow up appointment with [employer], no answer or voicemail setup so I will call later.    Subjective:       Patient ID: Traci Pereyra is a 81 y.o. female.    Chief Complaint: Cerumen Impaction; Hearing Loss; and Itching (right ear)    Itching   Pertinent negatives include no abdominal pain, nausea or vomiting.      Patient wears hearing aids AU. She returns for annual audiogram and hearing aid adjustment. She denies noise exposure, otologic history of surgery or trauma, family history of hearing loss.  She has occasional aural pruritis AD. She denies otalgia, otorrhea, or any other current ENT symptoms or concerns.     Review of Systems   Constitutional: Negative.    HENT: Positive for hearing loss.    Eyes: Negative.    Respiratory: Negative.    Cardiovascular: Negative.    Gastrointestinal: Negative for abdominal pain, nausea and vomiting.   Musculoskeletal: Negative.    Skin: Positive for itching.   Neurological: Negative.    Psychiatric/Behavioral: Negative.        Objective:      Physical Exam   Constitutional: She is oriented to person, place, and time. Vital signs are normal. She appears well-developed and well-nourished. She is cooperative. She does not appear ill. No distress.   HENT:   Head: Normocephalic and atraumatic.   Right Ear: Hearing, tympanic membrane, external ear and ear canal normal. Tympanic membrane is not erythematous. No middle ear effusion.   Left Ear: Hearing, tympanic membrane, external ear and ear canal normal. Tympanic membrane is not erythematous.  No middle ear effusion.   Nose: Septal deviation (right) present. No mucosal edema or rhinorrhea. Right sinus exhibits no maxillary sinus tenderness and no frontal sinus tenderness. Left sinus exhibits no maxillary sinus tenderness and no frontal sinus tenderness.   Mouth/Throat: Uvula is midline, oropharynx is clear and moist and mucous membranes are normal. Mucous membranes are not pale, not dry and not cyanotic. No oral lesions. No oropharyngeal exudate, posterior oropharyngeal edema or posterior oropharyngeal erythema.      SEPARATE PROCEDURE IN OFFICE:   Procedure: Removal of impacted cerumen, AU   Pre Procedure Diagnosis: Cerumen Impaction   Post Procedure Diagnosis: Cerumen Impaction   Verbal informed consent in regards to risk of trauma to ear canal, ear drum or hearing, discomfort during procedure and/or inability to remove cerumen impaction in one session or unforeseen events or complications.   No anesthesia.     Procedure in detail:   Ear canal visualized bilateral with appropriate size ear speculum utilizing Operating Head Binocular Otomicroscope   Utilizing the following: Ring curet AU. The impacted cerumen of the ear canals was removed atraumatically. The TM and EAC were then inspected and found to be clear of wax. See description of TMs/EACs in PE above.   Complications: No   Condition: Improved/Good     Eyes: Conjunctivae, EOM and lids are normal. Pupils are equal, round, and reactive to light. Right eye exhibits no discharge. Left eye exhibits no discharge. No scleral icterus.   Neck: Trachea normal and normal range of motion. Neck supple. No tracheal deviation present. No thyroid mass and no thyromegaly present.   Cardiovascular: Normal rate.   Pulmonary/Chest: Effort normal. No stridor. No respiratory distress. She has no wheezes.   Musculoskeletal: Normal range of motion.   Lymphadenopathy:        Head (right side): No submental, no submandibular, no tonsillar, no preauricular and no posterior auricular adenopathy present.        Head (left side): No submental, no submandibular, no tonsillar, no preauricular and no posterior auricular adenopathy present.     She has no cervical adenopathy.        Right cervical: No superficial cervical and no posterior cervical adenopathy present.       Left cervical: No superficial cervical and no posterior cervical adenopathy present.   Neurological: She is alert and oriented to person, place, and time. She has normal strength. Coordination and gait normal.   Skin: Skin is warm,  dry and intact. No lesion and no rash noted. She is not diaphoretic. No cyanosis. No pallor.   Psychiatric: She has a normal mood and affect. Her speech is normal and behavior is normal. Judgment and thought content normal. Cognition and memory are normal.   Nursing note and vitals reviewed.      Assessment:     Cerumen impactions removed AU    Mild to moderately-severe SNHL AU with fair speech discrimination AU  Plan:     Recommend continued use of binaural amplification  Return to clinic as needed for further ENT concerns.

## 2023-11-07 NOTE — PROGRESS NOTES
Outpatient Interventional Radiology RN Note:    US-guided fine needle aspiration of bilateral thyroid nodule completed by Dr. Villalpando; Procedure explained by MD prior to start and consent obtained, all questions/concerns addressed; Site marked and visualized; No procedural sedation required.    Procedure completed via right and left anterior neck; 2 jars of cytolyt obtained and x2 afirma and sent to pathology for analysis; Puncture site covered with bandaids upon completion.    Patient tolerated the procedure well; Provided with appropriate discharge education, all questions/concerns addressed; Patient discharged home    .

## 2023-11-08 ENCOUNTER — HOSPITAL ENCOUNTER (OUTPATIENT)
Dept: HEMATOLOGY ONCOLOGY | Facility: MEDICAL CENTER | Age: 53
End: 2023-11-08
Attending: NURSE PRACTITIONER
Payer: COMMERCIAL

## 2023-11-08 VITALS
RESPIRATION RATE: 12 BRPM | WEIGHT: 217.7 LBS | HEIGHT: 68 IN | OXYGEN SATURATION: 98 % | TEMPERATURE: 97.3 F | BODY MASS INDEX: 32.99 KG/M2 | DIASTOLIC BLOOD PRESSURE: 64 MMHG | SYSTOLIC BLOOD PRESSURE: 108 MMHG | HEART RATE: 81 BPM

## 2023-11-08 DIAGNOSIS — E04.2 MULTIPLE THYROID NODULES: ICD-10-CM

## 2023-11-08 PROCEDURE — 99212 OFFICE O/P EST SF 10 MIN: CPT

## 2023-11-08 PROCEDURE — 99213 OFFICE O/P EST LOW 20 MIN: CPT | Performed by: NURSE PRACTITIONER

## 2023-11-08 ASSESSMENT — FIBROSIS 4 INDEX: FIB4 SCORE: 0.9

## 2023-11-08 ASSESSMENT — ENCOUNTER SYMPTOMS
SORE THROAT: 0
NERVOUS/ANXIOUS: 1

## 2023-11-08 ASSESSMENT — PAIN SCALES - GENERAL: PAINLEVEL: 4=SLIGHT-MODERATE PAIN

## 2023-11-08 NOTE — TELEPHONE ENCOUNTER
Phone Number Called: [phone] (home)     Call outcome: Spoke to patient regarding message below.    Message: Spoke with Hortencia along with [employer] to schedule a follow up appointment to go over results; scheduled for tomorrow 11/8.

## 2023-11-09 NOTE — ADDENDUM NOTE
Encounter addended by: Roslyn Wheeler, Med Ass't on: 11/9/2023 1:34 PM   Actions taken: Charge Capture section accepted
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
## 2023-12-07 ENCOUNTER — TELEPHONE (OUTPATIENT)
Dept: HEMATOLOGY ONCOLOGY | Facility: MEDICAL CENTER | Age: 53
End: 2023-12-07
Payer: COMMERCIAL

## 2023-12-07 NOTE — TELEPHONE ENCOUNTER
Patient seen multiple times by me over the last year of 2023. See previous notes from complete history.     Abnormal MRI bone  Patient was initially seen for abnormal MRI of the bone.  After further workup patient was seen by interventional radiology to discuss kyphoplasty and biopsy.  She did have this completed in April 2023 which showed no evidence of malignancy.  Myeloma workup was completed which was found to be negative.    Thyroid Nodule  Patient has found to have a thyroid nodule.  She did undergo biopsy on 4/18/2023 which was found to be benign.  Repeat ultrasound did show increase in size of contralateral thyroid nodule and therefore she did undergo biopsy on 11/7/2023 of both right and left middle thyroid nodules, both found to be benign.    Parotid Nodule  During her initial thyroid biopsy in April 2023 she did undergo biopsy of the left periparotid nodule which was found to be consistent of an epithelial neoplasm.  Therefore she was referred to ENT, Dr. Nugent.  She recently underwent surgical excisional biopsy in November 2023.  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  No malignant features seen.      I have personally discussed with ENT physician, Dr. Nugent.  At this time patient will continue to follow-up with ENT for continued monitoring and management of thyroid nodules.  Therefore no further follow-up needed with [facility].  I attempted to contact the patient today via the U[place]bek translation line and unfortunately I am unable to leave a voice message as her mailbox is full.    I will update patient's PCP at this time.  
monitored anesthesia care (MAC)

## 2023-12-23 DIAGNOSIS — M54.50 CHRONIC LEFT-SIDED LOW BACK PAIN WITHOUT SCIATICA: ICD-10-CM

## 2023-12-23 DIAGNOSIS — G89.29 CHRONIC LEFT-SIDED LOW BACK PAIN WITHOUT SCIATICA: ICD-10-CM

## 2023-12-26 RX ORDER — MELOXICAM 15 MG/1
15 TABLET ORAL
Qty: 90 TABLET | Refills: 1 | Status: SHIPPED | OUTPATIENT
Start: 2023-12-26 | End: 2024-02-05 | Stop reason: SDUPTHER

## 2023-12-28 ENCOUNTER — PATIENT MESSAGE (OUTPATIENT)
Dept: MEDICAL GROUP | Facility: PHYSICIAN GROUP | Age: 53
End: 2023-12-28
Payer: COMMERCIAL

## 2023-12-28 NOTE — PATIENT COMMUNICATION
"Pt came in to get her OBGYN referral faxed to \"[facility]\" as shown on prev referral, said she called and a fax referral is required according to .  Please advise.  "

## 2024-02-05 ENCOUNTER — OFFICE VISIT (OUTPATIENT)
Dept: MEDICAL GROUP | Facility: PHYSICIAN GROUP | Age: 54
End: 2024-02-05
Payer: COMMERCIAL

## 2024-02-05 VITALS
RESPIRATION RATE: 20 BRPM | SYSTOLIC BLOOD PRESSURE: 106 MMHG | DIASTOLIC BLOOD PRESSURE: 62 MMHG | HEIGHT: 68 IN | BODY MASS INDEX: 32.43 KG/M2 | HEART RATE: 72 BPM | TEMPERATURE: 97.3 F | WEIGHT: 214 LBS | OXYGEN SATURATION: 95 %

## 2024-02-05 DIAGNOSIS — L29.2 VULVOVAGINAL ITCHING: ICD-10-CM

## 2024-02-05 DIAGNOSIS — M54.50 CHRONIC LEFT-SIDED LOW BACK PAIN WITHOUT SCIATICA: ICD-10-CM

## 2024-02-05 DIAGNOSIS — E04.1 THYROID NODULE: ICD-10-CM

## 2024-02-05 DIAGNOSIS — G89.29 CHRONIC LEFT-SIDED LOW BACK PAIN WITHOUT SCIATICA: ICD-10-CM

## 2024-02-05 DIAGNOSIS — G89.29 CHRONIC BILATERAL THORACIC BACK PAIN: ICD-10-CM

## 2024-02-05 DIAGNOSIS — M54.6 CHRONIC BILATERAL THORACIC BACK PAIN: ICD-10-CM

## 2024-02-05 DIAGNOSIS — E78.00 HIGH CHOLESTEROL: ICD-10-CM

## 2024-02-05 PROCEDURE — 3074F SYST BP LT 130 MM HG: CPT

## 2024-02-05 PROCEDURE — 99214 OFFICE O/P EST MOD 30 MIN: CPT

## 2024-02-05 PROCEDURE — 3078F DIAST BP <80 MM HG: CPT

## 2024-02-05 RX ORDER — TIZANIDINE 4 MG/1
4 TABLET ORAL EVERY 6 HOURS PRN
Qty: 30 TABLET | Refills: 3 | Status: SHIPPED | OUTPATIENT
Start: 2024-02-05

## 2024-02-05 RX ORDER — MELOXICAM 15 MG/1
15 TABLET ORAL
Qty: 90 TABLET | Refills: 1 | Status: SHIPPED | OUTPATIENT
Start: 2024-02-05

## 2024-02-05 ASSESSMENT — PATIENT HEALTH QUESTIONNAIRE - PHQ9: CLINICAL INTERPRETATION OF PHQ2 SCORE: 0

## 2024-02-05 ASSESSMENT — FIBROSIS 4 INDEX: FIB4 SCORE: 0.9

## 2024-02-05 ASSESSMENT — ENCOUNTER SYMPTOMS: BACK PAIN: 1

## 2024-02-05 NOTE — ASSESSMENT & PLAN NOTE
Chronic, ongoing. Reports pain is worse with cold temperatures, or with increased exertion. Is taking meloxicam 15 mg as needed daily, tizanidine as needed.

## 2024-02-05 NOTE — ASSESSMENT & PLAN NOTE
Chronic, unstable. Discussed healthy lifestyle recommendations.   The 10-year ASCVD risk score (Raheem CHEUNG, et al., 2019) is: 1.6%

## 2024-02-05 NOTE — PROGRESS NOTES
"Subjective:     CC: Diagnoses of Thyroid nodule, High cholesterol, Chronic left-sided low back pain without sciatica, Chronic bilateral thoracic back pain, and Vulvovaginal itching were pertinent to this visit.    Pt presents today for lab review. Used  with language line .    HPI:   Hortencia presents today with    Problem   Vulvovaginal Itching   Chronic Bilateral Thoracic Back Pain   High Cholesterol   Thyroid Nodule       ROS:  Review of Systems   Genitourinary:         Vaginal itching/dryness   Musculoskeletal:  Positive for back pain.   All other systems reviewed and are negative.      Objective:     Exam:  /62 (BP Location: Left arm, Patient Position: Sitting, BP Cuff Size: Adult)   Pulse 72   Temp 36.3 °C (97.3 °F) (Temporal)   Resp 20   Ht 1.727 m (5' 8\")   Wt 97.1 kg (214 lb)   LMP 11/27/2012   SpO2 95%   BMI 32.54 kg/m²  Body mass index is 32.54 kg/m².    Physical Exam  Vitals reviewed.   Constitutional:       General: She is not in acute distress.     Appearance: Normal appearance. She is not ill-appearing.   HENT:      Head: Normocephalic and atraumatic.   Cardiovascular:      Rate and Rhythm: Normal rate.      Pulses: Normal pulses.   Pulmonary:      Effort: Pulmonary effort is normal. No respiratory distress.   Skin:     General: Skin is warm and dry.      Findings: No rash.   Neurological:      General: No focal deficit present.      Mental Status: She is alert and oriented to person, place, and time.   Psychiatric:         Mood and Affect: Mood normal.         Behavior: Behavior normal.         Labs:    Latest Reference Range & Units 10/07/23 09:54   Sodium 135 - 145 mmol/L 140   Potassium 3.6 - 5.5 mmol/L 4.4   Chloride 96 - 112 mmol/L 105   Co2 20 - 33 mmol/L 28   Anion Gap 7.0 - 16.0  7.0   Glucose 65 - 99 mg/dL 93   Bun 8 - 22 mg/dL 15   Creatinine 0.50 - 1.40 mg/dL 0.58   GFR (CKD-EPI) >60 mL/min/1.73 m 2 107   Calcium 8.5 - 10.5 mg/dL 9.7   Correct " Calcium 8.5 - 10.5 mg/dL 9.4   AST(SGOT) 12 - 45 U/L 20   ALT(SGPT) 2 - 50 U/L 28   Alkaline Phosphatase 30 - 99 U/L 84   Total Bilirubin 0.1 - 1.5 mg/dL 0.4   Albumin 3.2 - 4.9 g/dL 4.4   Total Protein 6.0 - 8.2 g/dL 8.0   Globulin 1.9 - 3.5 g/dL 3.6 (H)   A-G Ratio g/dL 1.2   Glycohemoglobin 4.0 - 5.6 % 5.5   Estim. Avg Glu mg/dL 111   Cholesterol,Tot 100 - 199 mg/dL 255 (H)   Triglycerides 0 - 149 mg/dL 131   HDL >=40 mg/dL 62   LDL <100 mg/dL 167 (H)   TSH 0.380 - 5.330 uIU/mL 0.640   (H): Data is abnormally high    Assessment & Plan:     53 y.o. female with the following -     Problem List Items Addressed This Visit       Thyroid nodule     Chronic, seeing ENT for this, had lump removed, nonmalignant. F/u with ENT as scheduled         High cholesterol     Chronic, unstable. Discussed healthy lifestyle recommendations.   The 10-year ASCVD risk score (Raheem DK, et al., 2019) is: 1.6%           Chronic bilateral thoracic back pain     Chronic, ongoing. Reports pain is worse with cold temperatures, or with increased exertion. Is taking meloxicam 15 mg as needed daily, tizanidine as needed.         Relevant Medications    tizanidine (ZANAFLEX) 4 MG Tab    meloxicam (MOBIC) 15 MG tablet    Chronic left-sided low back pain without sciatica    Relevant Medications    tizanidine (ZANAFLEX) 4 MG Tab    meloxicam (MOBIC) 15 MG tablet    Vulvovaginal itching     Chronic, ongoing. Reports pain with coitus and itching. Denies odor or change in discharge other than dryness.  Will try vaginal estrogen for this.   Previously referred to GYN, unable to get appointment.         Relevant Medications    estrogens, conjugated (PREMARIN) 0.625 MG/GM Cream    Other Relevant Orders    Referral to Gynecology     Patient was educated in proper administration of medication(s) ordered today including safety, possible SE, risks, benefits, rationale and alternatives to therapy.   Supportive care, differential diagnoses, and indications for  immediate follow-up discussed with patient.    Pathogenesis of diagnosis discussed including typical length and natural progression.    Instructed to return to clinic or nearest emergency department for any change in condition, further concerns, or worsening of symptoms.  Patient states understanding of the plan of care and discharge instructions.    Return in about 6 months (around 8/5/2024) for wellness.    Please note that this dictation was created using voice recognition software. I have made every reasonable attempt to correct obvious errors, but I expect that there are errors of grammar and possibly content that I did not discover before finalizing the note.

## 2024-02-05 NOTE — ASSESSMENT & PLAN NOTE
Chronic, ongoing. Reports pain with coitus and itching. Denies odor or change in discharge other than dryness.  Will try vaginal estrogen for this.   Previously referred to GYN, unable to get appointment.

## 2024-02-21 ENCOUNTER — HOSPITAL ENCOUNTER (OUTPATIENT)
Facility: MEDICAL CENTER | Age: 54
End: 2024-02-21
Attending: STUDENT IN AN ORGANIZED HEALTH CARE EDUCATION/TRAINING PROGRAM
Payer: COMMERCIAL

## 2024-02-21 ENCOUNTER — GYNECOLOGY VISIT (OUTPATIENT)
Dept: OBGYN | Facility: CLINIC | Age: 54
End: 2024-02-21
Payer: COMMERCIAL

## 2024-02-21 VITALS
BODY MASS INDEX: 33.34 KG/M2 | HEIGHT: 68 IN | SYSTOLIC BLOOD PRESSURE: 124 MMHG | WEIGHT: 220 LBS | DIASTOLIC BLOOD PRESSURE: 79 MMHG

## 2024-02-21 DIAGNOSIS — Z01.419 ENCOUNTER FOR WELL WOMAN EXAM: ICD-10-CM

## 2024-02-21 DIAGNOSIS — N89.8 VAGINAL ITCHING: ICD-10-CM

## 2024-02-21 DIAGNOSIS — Z12.31 ENCOUNTER FOR SCREENING MAMMOGRAM FOR MALIGNANT NEOPLASM OF BREAST: ICD-10-CM

## 2024-02-21 DIAGNOSIS — N89.8 VAGINAL ODOR: ICD-10-CM

## 2024-02-21 DIAGNOSIS — K62.89 RECTAL PAIN: ICD-10-CM

## 2024-02-21 PROCEDURE — 99386 PREV VISIT NEW AGE 40-64: CPT | Performed by: STUDENT IN AN ORGANIZED HEALTH CARE EDUCATION/TRAINING PROGRAM

## 2024-02-21 PROCEDURE — 87510 GARDNER VAG DNA DIR PROBE: CPT

## 2024-02-21 PROCEDURE — 99459 PELVIC EXAMINATION: CPT | Performed by: STUDENT IN AN ORGANIZED HEALTH CARE EDUCATION/TRAINING PROGRAM

## 2024-02-21 PROCEDURE — 87660 TRICHOMONAS VAGIN DIR PROBE: CPT

## 2024-02-21 PROCEDURE — 3078F DIAST BP <80 MM HG: CPT | Performed by: STUDENT IN AN ORGANIZED HEALTH CARE EDUCATION/TRAINING PROGRAM

## 2024-02-21 PROCEDURE — 3074F SYST BP LT 130 MM HG: CPT | Performed by: STUDENT IN AN ORGANIZED HEALTH CARE EDUCATION/TRAINING PROGRAM

## 2024-02-21 PROCEDURE — 87480 CANDIDA DNA DIR PROBE: CPT

## 2024-02-21 ASSESSMENT — FIBROSIS 4 INDEX: FIB4 SCORE: 0.9

## 2024-02-21 NOTE — PROGRESS NOTES
"ANNUAL GYNECOLOGY VISIT    Chief Complaint  Annual Exam  New Patient      Subjective  Hortencia Bnoilla is a 53 y.o. female  Patient's last menstrual period was 2012. Currently is on postmenoapusal . Presents today for Annual Exam. Does have multiple other things she would like to discuss.     Pt complains of vaingal itching and odor that has been going on for a few months. Also has noticed increased vaginal dryness and irriation.  Hx of BV 2023. Last was swabbed in September which was negative. She discussed the vaginal dryness with her PCP and she was started on premarin cream by PCP, but had some confusion with how to use. States she has only used 4 times since getting the prescription 2024.     Also, c/o \"rectal pulsing.\" As well as mild anal itching. Sates that this has been going on for some time. It did initially start when she had diarrhea,but this has since resolved, but she continues to have symptoms. Currently denies any diarrhea or constipation. Pt states has had colonoscopy in the past and was normal. Denies any rectal bleeding. No hx of hemorrhoids. Has not tried anything for this.        Occupation:       Preventive Care   Immunization History   Administered Date(s) Administered    INFLUENZA TIV (IM) 2017    Influenza Seasonal Injectable - Historical Data 2013, 2017    Influenza Vaccine Quad Inj (Pf) 10/19/2015, 2022, 2023    PFIZER PURPLE CAP SARS-COV-2 VACCINATION (12+) 2021, 2021    Tdap Vaccine 2017     Last Pap: 2020 NILM; HPV negative  Any abnormal pap smears?  no  Last Mammogram: 2023  Last Colonoscopy: states was recently within the last year  Last DEXA: n/a  Immunizations: all desired UTD      Gynecology History  Current Sexual Activity: yes - with   Currently in a relationship: yes  Feel safe in your current relationship: yes  History of sexually transmitted diseases? no  Abnormal discharge? yes - mild " discharge and odor  Menopause: yes - went through menopause at the age of 44  Postmenopausal bleeding: no    Menstrual History  Patient's last menstrual period was 2012.  No bleeding since going through menopause    Contraception  Current: postmenopausal      Cancer Risk Assessement:  Family history of:  Mother had stomach cancer   - Breast cancer: no   - Ovarian cancer: no   - Uterine cancer: no   - Colon cancer: no    Obstetric History  OB History    Para Term  AB Living   4 3 3   1 3   SAB IAB Ectopic Molar Multiple Live Births             3      # Outcome Date GA Lbr Jose Elias/2nd Weight Sex Delivery Anes PTL Lv   4 AB            3 Term      Vag-Spont   HUGO   2 Term      Vag-Spont   HUGO   1 Term      CS-Unspec   HUGO       Past Medical History  Past Medical History:   Diagnosis Date    Bipolar disorder (HCC)     Thyroid nodule        Past Surgical History  Past Surgical History:   Procedure Laterality Date    TN INJ DX/THER AGNT PARAVERT FACET JOINT, PETE* Right 10/26/2023    Procedure: Diagnostic medial branch blocks targeting the LEFT L4-5 and L5-S1 facet joints with fluoroscopic guidance #1 with sedation.        Note: plan on versed 1mg.  Lithuanian speaking;  Surgeon: Miles Mahan M.D.;  [facility];  Service: Pain Management    PRIMARY C SECTION      x 1       Social History  Social History     Tobacco Use    Smoking status: Never    Smokeless tobacco: Never   Vaping Use    Vaping Use: Never used   Substance Use Topics    Alcohol use: No    Drug use: No        Family History  Family History   Problem Relation Age of Onset    Colorectal Cancer Mother     Esophageal Cancer Mother     Other Brother         OCD ([place]) half brother    Cancer Maternal Aunt         unsure    Cancer Maternal Grandmother         unsure of type    Drug abuse Daughter     Psychiatric Illness Daughter     Diabetes Neg Hx     Hyperlipidemia Neg Hx     Hypertension Neg Hx     Heart Disease Neg Hx   "   Stroke Neg Hx        Home Medications  Current Outpatient Medications   Medication Sig    tizanidine (ZANAFLEX) 4 MG Tab Take 1 Tablet by mouth every 6 hours as needed (moderate to severe pain, muscle spasm).    meloxicam (MOBIC) 15 MG tablet Take 1 Tablet by mouth every day.    estrogens, conjugated (PREMARIN) 0.625 MG/GM Cream Insert 0.625 g into the vagina every day for 14 days, THEN 0.625 g every 72 hours for 99 days. Initial: 0.5 to 1 g once daily for 2 weeks, then reduce dose and/or frequency to lowest effective dose (usual maintenance dose: 0.5 to 1 g one to 3 times/week)    Calcium Carbonate (CALCIUM 500 PO) Take 1 Tablet by mouth every day.    Cyanocobalamin (VITAMIN B12 PO) Take 2 Tabs by mouth every day.    Cholecalciferol (VITAMIN D) 2000 UNIT Tab Take 4,000 Units by mouth every day.    MAGNESIUM PO Take 1-2 Tabs by mouth every day.    Omega-3 Fatty Acids (OMEGA 3 PO) Take  by mouth.       Allergies/Reactions  Allergies   Allergen Reactions    Bloodless        ROS  Review of Systems: vaginal itching and odor  Gen: no fevers or chills, no significant weight loss or gain  Respiratory:  no cough or dyspnea  Cardiac:  no chest pain, no palpitations, no syncope  GI:  no heartburn, no abdominal pain, no nausea or vomiting  Psych: no depression or anxiety    Objective  /79 (BP Location: Left arm, Patient Position: Sitting, BP Cuff Size: Adult)   Ht 5' 8\"   Wt 220 lb   LMP 11/27/2012   BMI 33.45 kg/m²     Constitutional: The patient is well developed and well nourished.  Psychiatric: Patient is oriented to time place and person.   Skin: No rash observed.  Neck: Appears symmetric. Thyroid normal size  Respiratory: normal effort  Breast: Inspection reveals no asymetry or nipple discharge, no skin thickening, dimpling or erythema.  Palpation demonstrates no masses.  Abdomen: Soft, non-tender.  Pelvic Exam:      Vulva: external female genitalia shows mild atrophy and dryness.. No lesions     Urethra - " "no lesions, no erythema     Vagina: dry, pink, atrophy noted. Mild irriation. No significant discharge appreciated     Cervix: pink, smooth, no lesions, no CMT     Uterus - non-tender, normal size, shape, contour, mobile, anteverted     Ovaries: non-tender, no appreciable masses   Pap Smear performed: No   Chaperone Present: Mahnaz Pa MA  Rectum: no evidence of prolapse or hemorrhoids appreciated  Extremities: Legs are symmetric and without tenderness. There is no edema present.      Assessment & Plan  Hortencia Bonilla is a 53 y.o. female who presents today for Annual Gyn Exam.     1. Health Maintenance   PAP: UTD  STI Screen (HIV, Syphilis, Chlamydia / Gonorrhea): declines  MAMMOGRAM: UTD-ordered as it is due 04/2024  COLONOSCOPY: UTD   DEXA: n/a  IMMUNIZATIONS: all desired UTD  TOBACCO: declines  DIABETES SCREEN: UTD  CHOLESTEROL SCREEN: UTD  COUNSELING: breast self exam and mammography screening    1. Encounter for well woman exam  Pt comes in to establish care. Pap and mammogram are UTD. She is du for mammogram 04/2024. This has been ordered and she may schedule once she is due for her annual.     2. Vaginal itching/Vaginal odor  Mild itching and odor for a few months. Denies significant discharge. Hx of BV 06/2023. On physical exam I do no appreciate significant odor or discharge on exam. Does have some atrophy to vulva and dryness noticed c/w menopausal status.   - VAGINAL PATHOGENS DNA PANEL; Future      4. Encounter for screening mammogram for malignant neoplasm of breast  Due 04/2024  - MA-DIAGNOSTIC MAMMO BILAT W/TOMOSYNTHESIS W/CAD; Future    5. Rectal pain  Pt with rectal discomfort and a \"pulsing\" feeling intermittently. Denies any current constipation or diarrhea. No hx of hemorrhoids. Denies any blood. No abdominal pain. On physical exam I do not appreciate a prolapse or signs of external hemorrhoids. Pt would like to discuss this further with a GI specialist.   - Referral to " Gastroenterology      Return: Annually or PRN    Netta Ho P.A.-C.  2/21/2024

## 2024-02-21 NOTE — PROGRESS NOTES
NP/ vaginal itching and odor  CC dryness with sex and low sex drive   PHONE # VERIFIED  PHARMACY VERIFIED

## 2024-02-22 LAB
CANDIDA DNA VAG QL PROBE+SIG AMP: NEGATIVE
G VAGINALIS DNA VAG QL PROBE+SIG AMP: POSITIVE
T VAGINALIS DNA VAG QL PROBE+SIG AMP: NEGATIVE

## 2024-02-22 RX ORDER — METRONIDAZOLE 500 MG/1
500 TABLET ORAL 2 TIMES DAILY
Qty: 14 TABLET | Refills: 0 | Status: SHIPPED | OUTPATIENT
Start: 2024-02-22 | End: 2024-02-29

## 2024-02-23 ENCOUNTER — TELEPHONE (OUTPATIENT)
Dept: OBGYN | Facility: CLINIC | Age: 54
End: 2024-02-23
Payer: COMMERCIAL

## 2024-02-23 NOTE — TELEPHONE ENCOUNTER
Caller Name: alfred HAYWARD  Call Back Number: [phone]    How would the patient prefer to be contacted with a response: Phone call do NOT leave a detailed message    Tried to reach out to pt to advise per [place] that vag path came back positive for BV medication was sent into pharmacy for her to start,  unable to leave voicemail no vm set up// mychart message from provider to pt has not yet been reviewed

## 2024-03-11 ENCOUNTER — HOSPITAL ENCOUNTER (OUTPATIENT)
Facility: MEDICAL CENTER | Age: 54
End: 2024-03-11
Payer: COMMERCIAL

## 2024-03-11 ENCOUNTER — TELEPHONE (OUTPATIENT)
Dept: MEDICAL GROUP | Facility: PHYSICIAN GROUP | Age: 54
End: 2024-03-11

## 2024-03-11 ENCOUNTER — OFFICE VISIT (OUTPATIENT)
Dept: MEDICAL GROUP | Facility: PHYSICIAN GROUP | Age: 54
End: 2024-03-11
Payer: COMMERCIAL

## 2024-03-11 VITALS
BODY MASS INDEX: 34.31 KG/M2 | WEIGHT: 226.4 LBS | DIASTOLIC BLOOD PRESSURE: 80 MMHG | OXYGEN SATURATION: 97 % | HEIGHT: 68 IN | TEMPERATURE: 98.7 F | SYSTOLIC BLOOD PRESSURE: 110 MMHG | HEART RATE: 74 BPM

## 2024-03-11 DIAGNOSIS — M54.6 CHRONIC BILATERAL THORACIC BACK PAIN: ICD-10-CM

## 2024-03-11 DIAGNOSIS — Z79.899 HIGH RISK MEDICATION USE: ICD-10-CM

## 2024-03-11 DIAGNOSIS — M25.561 CHRONIC PAIN OF BOTH KNEES: ICD-10-CM

## 2024-03-11 DIAGNOSIS — E66.9 OBESITY (BMI 30-39.9): ICD-10-CM

## 2024-03-11 DIAGNOSIS — G89.29 CHRONIC BILATERAL THORACIC BACK PAIN: ICD-10-CM

## 2024-03-11 DIAGNOSIS — G89.29 CHRONIC PAIN OF BOTH KNEES: ICD-10-CM

## 2024-03-11 DIAGNOSIS — M25.562 CHRONIC PAIN OF BOTH KNEES: ICD-10-CM

## 2024-03-11 PROCEDURE — 3074F SYST BP LT 130 MM HG: CPT

## 2024-03-11 PROCEDURE — 3079F DIAST BP 80-89 MM HG: CPT

## 2024-03-11 PROCEDURE — 80307 DRUG TEST PRSMV CHEM ANLYZR: CPT

## 2024-03-11 PROCEDURE — 99214 OFFICE O/P EST MOD 30 MIN: CPT

## 2024-03-11 RX ORDER — PHENTERMINE HYDROCHLORIDE 30 MG/1
30 CAPSULE ORAL EVERY MORNING
Qty: 30 CAPSULE | Refills: 0 | Status: SHIPPED | OUTPATIENT
Start: 2024-03-11 | End: 2024-04-10

## 2024-03-11 RX ORDER — PHENTERMINE HYDROCHLORIDE 30 MG/1
30 CAPSULE ORAL EVERY MORNING
Qty: 30 CAPSULE | Refills: 0 | Status: SHIPPED | OUTPATIENT
Start: 2024-05-10 | End: 2024-06-09

## 2024-03-11 RX ORDER — PHENTERMINE HYDROCHLORIDE 30 MG/1
30 CAPSULE ORAL EVERY MORNING
Qty: 30 CAPSULE | Refills: 0 | Status: SHIPPED | OUTPATIENT
Start: 2024-04-10 | End: 2024-05-10

## 2024-03-11 ASSESSMENT — ENCOUNTER SYMPTOMS: BACK PAIN: 1

## 2024-03-11 ASSESSMENT — FIBROSIS 4 INDEX: FIB4 SCORE: 0.9

## 2024-03-11 NOTE — ASSESSMENT & PLAN NOTE
Chronic, unstable. Discussed healthy lifestyle recommendations. Reports back pain, knee pain, which makes exercise challenging.   Discussed healthy lifestyle recommendations, options for weight loss assistance.   Has met with nutritionist in the past.  Has used phentermine in the past for weight loss with positive benefit. Would like to resume this.    Obtained and reviewed patient utilization report from [facility] pharmacy database on 3/11/2024 4:37 PM  prior to writing prescription for controlled substance II, III or IV per [place] bill . Based on assessment of the report,my physical exam if necessary and the patient's health problem, the prescription is medically necessary.

## 2024-03-11 NOTE — TELEPHONE ENCOUNTER
1. Name: Hortencia Bonilla      Call Back Number: [phone] (home)         How would the patient prefer to be contacted with a response: Phone call OK to leave a detailed message    2. Which medication(s) is being requested? Phentermine    3. What is the preferred Pharmacy? [facility] on [address]    Patient was informed they may receive a return phone call from our office with any additional questions before processing this request.

## 2024-03-11 NOTE — PROGRESS NOTES
"  Verbal consent was acquired by the patient to use [place] ambient listening note generation during this visit     Subjective:     CC: Diagnoses of Obesity (BMI 30-39.9), High risk medication use, Chronic bilateral thoracic back pain, and Chronic pain of both knees were pertinent to this visit.    Translation with language line  used for this visit. She was accompanied by grandson in visit today. Here to discuss weight loss.    HPI:   Hortencia presents today with    History of Present Illness      Problem   Chronic Pain of Both Knees   Obesity (Bmi 30-39.9)    3/11/24 226 lb, reports heaviest 235 lb.     Chronic Bilateral Thoracic Back Pain     ROS:  Review of Systems   Constitutional:         Weight gain   Musculoskeletal:  Positive for back pain and joint pain.   All other systems reviewed and are negative.      Objective:     Exam:  /80 (BP Location: Left arm, Patient Position: Sitting, BP Cuff Size: Adult long)   Pulse 74   Temp 37.1 °C (98.7 °F) (Temporal)   Ht 1.727 m (5' 8\")   Wt 103 kg (226 lb 6.4 oz)   LMP 11/27/2012   SpO2 97%   BMI 34.42 kg/m²  Body mass index is 34.42 kg/m².    Physical Exam  Vitals reviewed.   Constitutional:       General: She is not in acute distress.     Appearance: Normal appearance. She is not ill-appearing.   HENT:      Head: Normocephalic and atraumatic.   Cardiovascular:      Rate and Rhythm: Normal rate.      Pulses: Normal pulses.   Pulmonary:      Effort: Pulmonary effort is normal. No respiratory distress.   Skin:     General: Skin is warm and dry.      Findings: No rash.   Neurological:      General: No focal deficit present.      Mental Status: She is alert and oriented to person, place, and time.   Psychiatric:         Mood and Affect: Mood normal.         Behavior: Behavior normal.       Assessment & Plan:     53 y.o. female with the following -     Assessment & Plan      Problem List Items Addressed This Visit          Family Medicine " Problems    Chronic bilateral thoracic back pain     Chronic, ongoing. Continue meloxicam as need, tizanidine as needed.         Chronic pain of both knees     Chronic, ongoing, worse with increased weight. Continue healthy lifestyle recommendations.             Other    Obesity (BMI 30-39.9)     Chronic, unstable. Discussed healthy lifestyle recommendations. Reports back pain, knee pain, which makes exercise challenging.   Discussed healthy lifestyle recommendations, options for weight loss assistance.   Has met with nutritionist in the past.  Has used phentermine in the past for weight loss with positive benefit. Would like to resume this.    Obtained and reviewed patient utilization report from [facility] [place] [facility] pharmacy database on 3/11/2024 4:37 PM  prior to writing prescription for controlled substance II, III or IV per [place] bill . Based on assessment of the report,my physical exam if necessary and the patient's health problem, the prescription is medically necessary.            Relevant Medications    phentermine 30 MG capsule    phentermine 30 MG capsule (Start on 4/10/2024)    phentermine 30 MG capsule (Start on 5/10/2024)    Other Relevant Orders    Comp Metabolic Panel    HEMOGLOBIN A1C    Lipid Profile     Other Visit Diagnoses       High risk medication use        Relevant Orders    Controlled Substance Treatment Agreement    PAIN MANAGEMENT PANEL, SCRN W/ RFLX TO QNT          Patient was educated in proper administration of medication(s) ordered today including safety, possible SE, risks, benefits, rationale and alternatives to therapy.   Supportive care, differential diagnoses, and indications for immediate follow-up discussed with patient.    Pathogenesis of diagnosis discussed including typical length and natural progression.    Instructed to return to clinic or nearest emergency department for any change in condition, further concerns, or worsening of symptoms.  Patient states understanding of  the plan of care and discharge instructions.    Return in about 3 months (around 6/11/2024) for weight loss, Labs, Controlled Substance.    Please note that this dictation was created using voice recognition software. I have made every reasonable attempt to correct obvious errors, but I expect that there are errors of grammar and possibly content that I did not discover before finalizing the note.

## 2024-03-13 LAB
AMPHET CTO UR CFM-MCNC: NEGATIVE NG/ML
BARBITURATES CTO UR CFM-MCNC: NEGATIVE NG/ML
BENZODIAZ CTO UR CFM-MCNC: NEGATIVE NG/ML
BUPRENORPHINE UR-MCNC: NEGATIVE NG/ML
CANNABINOIDS CTO UR CFM-MCNC: NEGATIVE NG/ML
CARISOPRODOL UR-MCNC: NEGATIVE NG/ML
COCAINE CTO UR CFM-MCNC: NEGATIVE NG/ML
CREAT UR-MCNC: 65.8 MG/DL (ref 20–400)
DRUG SCREEN COMMENT UR-IMP: NORMAL
ETHYL GLUCURONIDE UR QL SCN: NEGATIVE NG/ML
FENTANYL UR-MCNC: NEGATIVE NG/ML
MEPERIDINE CTO UR SCN-MCNC: NEGATIVE NG/ML
METHADONE CTO UR CFM-MCNC: NEGATIVE NG/ML
OPIATES UR QL SCN: NEGATIVE NG/ML
OXYCDOXYM URSCRN 2005102: NEGATIVE NG/ML
PCP CTO UR CFM-MCNC: NEGATIVE NG/ML
PROPOXYPH CTO UR CFM-MCNC: NEGATIVE NG/ML
TAPENTADOL UR-MCNC: NEGATIVE NG/ML
TRAMADOL CTO UR SCN-MCNC: NEGATIVE NG/ML
ZOLPIDEM UR-MCNC: NEGATIVE NG/ML

## 2024-03-15 ENCOUNTER — APPOINTMENT (OUTPATIENT)
Dept: MEDICAL GROUP | Facility: PHYSICIAN GROUP | Age: 54
End: 2024-03-15
Payer: COMMERCIAL

## 2024-04-29 ENCOUNTER — HOSPITAL ENCOUNTER (OUTPATIENT)
Dept: RADIOLOGY | Facility: MEDICAL CENTER | Age: 54
End: 2024-04-29
Attending: STUDENT IN AN ORGANIZED HEALTH CARE EDUCATION/TRAINING PROGRAM
Payer: COMMERCIAL

## 2024-04-29 DIAGNOSIS — Z12.31 ENCOUNTER FOR SCREENING MAMMOGRAM FOR MALIGNANT NEOPLASM OF BREAST: ICD-10-CM

## 2024-04-29 PROCEDURE — 77067 SCR MAMMO BI INCL CAD: CPT

## 2024-08-19 ENCOUNTER — OFFICE VISIT (OUTPATIENT)
Dept: MEDICAL GROUP | Facility: PHYSICIAN GROUP | Age: 54
End: 2024-08-19
Payer: COMMERCIAL

## 2024-08-19 VITALS
SYSTOLIC BLOOD PRESSURE: 116 MMHG | OXYGEN SATURATION: 98 % | HEIGHT: 68 IN | DIASTOLIC BLOOD PRESSURE: 80 MMHG | HEART RATE: 68 BPM | BODY MASS INDEX: 29.4 KG/M2 | WEIGHT: 194 LBS | TEMPERATURE: 97 F

## 2024-08-19 DIAGNOSIS — E04.1 THYROID NODULE: ICD-10-CM

## 2024-08-19 DIAGNOSIS — G89.29 CHRONIC LEFT-SIDED LOW BACK PAIN WITHOUT SCIATICA: ICD-10-CM

## 2024-08-19 DIAGNOSIS — Z13.0 SCREENING FOR DEFICIENCY ANEMIA: ICD-10-CM

## 2024-08-19 DIAGNOSIS — M54.6 CHRONIC BILATERAL THORACIC BACK PAIN: ICD-10-CM

## 2024-08-19 DIAGNOSIS — G89.29 CHRONIC BILATERAL THORACIC BACK PAIN: ICD-10-CM

## 2024-08-19 DIAGNOSIS — M25.562 CHRONIC PAIN OF BOTH KNEES: ICD-10-CM

## 2024-08-19 DIAGNOSIS — M25.561 CHRONIC PAIN OF BOTH KNEES: ICD-10-CM

## 2024-08-19 DIAGNOSIS — G89.29 CHRONIC PAIN OF BOTH KNEES: ICD-10-CM

## 2024-08-19 DIAGNOSIS — Z13.29 THYROID DISORDER SCREEN: ICD-10-CM

## 2024-08-19 DIAGNOSIS — Z11.59 NEED FOR HEPATITIS C SCREENING TEST: ICD-10-CM

## 2024-08-19 DIAGNOSIS — E66.9 OBESITY (BMI 30-39.9): ICD-10-CM

## 2024-08-19 DIAGNOSIS — E66.3 OVERWEIGHT (BMI 25.0-29.9): ICD-10-CM

## 2024-08-19 DIAGNOSIS — M62.89 PELVIC FLOOR DYSFUNCTION IN FEMALE: ICD-10-CM

## 2024-08-19 DIAGNOSIS — E55.9 VITAMIN D DEFICIENCY: ICD-10-CM

## 2024-08-19 DIAGNOSIS — M54.50 CHRONIC LEFT-SIDED LOW BACK PAIN WITHOUT SCIATICA: ICD-10-CM

## 2024-08-19 DIAGNOSIS — Z78.0 POSTMENOPAUSAL: ICD-10-CM

## 2024-08-19 PROCEDURE — 3074F SYST BP LT 130 MM HG: CPT

## 2024-08-19 PROCEDURE — 99396 PREV VISIT EST AGE 40-64: CPT

## 2024-08-19 PROCEDURE — 3079F DIAST BP 80-89 MM HG: CPT

## 2024-08-19 RX ORDER — PHENTERMINE HYDROCHLORIDE 30 MG/1
30 CAPSULE ORAL EVERY MORNING
Qty: 30 CAPSULE | Refills: 2 | Status: SHIPPED | OUTPATIENT
Start: 2024-08-19 | End: 2024-11-17

## 2024-08-19 RX ORDER — MELOXICAM 15 MG/1
15 TABLET ORAL
Qty: 90 TABLET | Refills: 2 | Status: SHIPPED | OUTPATIENT
Start: 2024-08-19

## 2024-08-19 ASSESSMENT — ENCOUNTER SYMPTOMS: BACK PAIN: 1

## 2024-08-19 ASSESSMENT — FIBROSIS 4 INDEX: FIB4 SCORE: 0.92

## 2024-08-19 NOTE — ASSESSMENT & PLAN NOTE
Chronic, stable. Continue healthy lifestyle recommendations.  Reports positive benefit with phentermine, continue phentermine 30 mg daily.      Obtained and reviewed patient utilization report from [facility] pharmacy database on 8/19/2024 11:40 AM  prior to writing prescription for controlled substance II, III or IV per [place] bill . Based on assessment of the report,my physical exam if necessary and the patient's health problem, the prescription is medically necessary.

## 2024-08-19 NOTE — ASSESSMENT & PLAN NOTE
Reports feeling pressure in vagina after voiding.  Consider pt/ref gyn  Provided information regarding pelvic floor dysfunction and exercises  Denies incontinence of bowel or bladder. Reports she feels like she may leak a little with sneezing.

## 2024-08-19 NOTE — PROGRESS NOTES
"Subjective:     CC: Diagnoses of Overweight (BMI 25.0-29.9), Chronic bilateral thoracic back pain, Chronic left-sided low back pain without sciatica, Chronic pain of both knees, Obesity (BMI 30-39.9), Postmenopausal, Need for hepatitis C screening test, Thyroid disorder screen, Screening for deficiency anemia, Vitamin D deficiency, Thyroid nodule, and Pelvic floor dysfunction in female were pertinent to this visit.    Pt presents today for routine wellness.   She reports symptoms of vaginal heaviness after voiding. Denies pain/dysuria. Discussed pelvic floor exercises, and provided information about this. Consider referral to gyn/PT for worsening/ongoing problems.     with language line used for visit.    HPI:   Hortencia presents today with    Problem   Pelvic Floor Dysfunction in Female   Chronic Pain of Both Knees   Chronic Left-Sided Low Back Pain Without Sciatica   Overweight (Bmi 25.0-29.9)    3/11/24 226 lb, reports heaviest 235 lb.     Chronic Bilateral Thoracic Back Pain   Thyroid Nodule       Health Maintenance: Completed  Cancer screening:   Colorectal cancer screening: due 2029  Cervical cancer screening: due 2025  Breast cancer screening: due 2026  Infectious disease screening/Immunizaitons  -STI screening: declines  Practices safe sex.    -Immunizaitons:   Influenza: recommend annually  Shingles vaccine; Covid vaccine: recommended  Tetanus: up-to-date: due 9/12/27  Anticipatory Guidance:  Elicits she is eating a variety of fresh veggies/fruits, lean meats,   Limited/ none: fast food/junk food/soda  Exercise: recommended 150 minutes/week;   Substance Abuse: n/a  Safe in relationship. Yes/  Seat belts, bike helmet, gun safety discussed. Dental home established,  Sun protection used.    ROS:  Review of Systems   Genitourinary:         Feels vaginal \"heaviness\" after voiding. Denies noticing prolapse   Musculoskeletal:  Positive for back pain and joint pain (improving to knees with " "weight loss).   All other systems reviewed and are negative.      Objective:     Exam:  /80 (BP Location: Left arm, Patient Position: Sitting, BP Cuff Size: Adult)   Pulse 68   Temp 36.1 °C (97 °F) (Temporal)   Ht 1.727 m (5' 8\")   Wt 88 kg (194 lb)   LMP 11/27/2012   SpO2 98%   BMI 29.50 kg/m²  Body mass index is 29.5 kg/m².    Physical Exam  Vitals reviewed.   Constitutional:       General: She is not in acute distress.     Appearance: Normal appearance. She is well-groomed. She is not ill-appearing.   HENT:      Head: Normocephalic and atraumatic.      Right Ear: Tympanic membrane, ear canal and external ear normal.      Left Ear: Tympanic membrane, ear canal and external ear normal.      Nose: Nose normal.      Mouth/Throat:      Mouth: Mucous membranes are moist.      Pharynx: Oropharynx is clear.   Eyes:      Extraocular Movements: Extraocular movements intact.      Conjunctiva/sclera: Conjunctivae normal.      Pupils: Pupils are equal, round, and reactive to light.   Neck:      Thyroid: No thyromegaly.      Trachea: Trachea normal.   Cardiovascular:      Rate and Rhythm: Normal rate and regular rhythm.      Pulses: Normal pulses.      Heart sounds: Normal heart sounds. No murmur heard.  Pulmonary:      Effort: Pulmonary effort is normal. No respiratory distress.      Breath sounds: Normal breath sounds. No wheezing.   Abdominal:      General: Bowel sounds are normal.   Musculoskeletal:         General: No swelling, tenderness or deformity. Normal range of motion.      Cervical back: Full passive range of motion without pain.   Lymphadenopathy:      Cervical: No cervical adenopathy.   Skin:     General: Skin is warm and dry.      Capillary Refill: Capillary refill takes less than 2 seconds.   Neurological:      General: No focal deficit present.      Mental Status: She is alert and oriented to person, place, and time. Mental status is at baseline.      Cranial Nerves: No cranial nerve deficit.     "  Sensory: No sensory deficit.      Motor: No weakness.   Psychiatric:         Mood and Affect: Mood normal.         Behavior: Behavior normal.       Assessment & Plan:     54 y.o. female with the following -     Problem List Items Addressed This Visit          Family Medicine Problems    Chronic bilateral thoracic back pain     Chronic, stable. Continue meloxicam 15 mg daily as needed, tizanidine 4 mg as needed         Relevant Medications    meloxicam (MOBIC) 15 MG tablet    tizanidine (ZANAFLEX) 4 MG Tab    Chronic left-sided low back pain without sciatica     Chronic, stable. Continue meloxicam 15 mg daily as needed, tizanidine as needed.         Relevant Medications    meloxicam (MOBIC) 15 MG tablet    tizanidine (ZANAFLEX) 4 MG Tab    Chronic pain of both knees     Chronic, improved slightly with weight loss. Continue this.          Relevant Medications    meloxicam (MOBIC) 15 MG tablet    tizanidine (ZANAFLEX) 4 MG Tab       Other    Thyroid nodule     11/7/23 FNA with benign results. Continue screening with tsh annually.         Overweight (BMI 25.0-29.9)     Chronic, stable. Continue healthy lifestyle recommendations.  Reports positive benefit with phentermine, continue phentermine 30 mg daily.      Obtained and reviewed patient utilization report from [facility] pharmacy database on 8/19/2024 11:40 AM  prior to writing prescription for controlled substance II, III or IV per [place] bill . Based on assessment of the report,my physical exam if necessary and the patient's health problem, the prescription is medically necessary.              Relevant Medications    phentermine 30 MG capsule    Other Relevant Orders    HEMOGLOBIN A1C    Comp Metabolic Panel    Lipid Profile    Pelvic floor dysfunction in female     Reports feeling pressure in vagina after voiding.  Consider pt/ref gyn  Provided information regarding pelvic floor dysfunction and exercises  Denies incontinence of bowel or bladder. Reports  she feels like she may leak a little with sneezing.              Other Visit Diagnoses       Obesity (BMI 30-39.9)        Relevant Medications    phentermine 30 MG capsule    Postmenopausal        Relevant Orders    DS-BONE DENSITY STUDY (DEXA)    Need for hepatitis C screening test        Relevant Orders    HEP C VIRUS ANTIBODY    Thyroid disorder screen        Relevant Orders    TSH    Screening for deficiency anemia        Relevant Orders    CBC WITHOUT DIFFERENTIAL    Vitamin D deficiency        Relevant Orders    VITAMIN D,25 HYDROXY (DEFICIENCY)          Patient was educated in proper administration of medication(s) ordered today including safety, possible SE, risks, benefits, rationale and alternatives to therapy.   Supportive care, differential diagnoses, and indications for immediate follow-up discussed with patient.    Pathogenesis of diagnosis discussed including typical length and natural progression.    Instructed to return to clinic or nearest emergency department for any change in condition, further concerns, or worsening of symptoms.  Patient states understanding of the plan of care and discharge instructions.    Return in about 4 weeks (around 9/16/2024), or if symptoms worsen or fail to improve, for Labs.    Please note that this dictation was created using voice recognition software. I have made every reasonable attempt to correct obvious errors, but I expect that there are errors of grammar and possibly content that I did not discover before finalizing the note.

## 2024-08-20 ENCOUNTER — HOSPITAL ENCOUNTER (OUTPATIENT)
Dept: LAB | Facility: MEDICAL CENTER | Age: 54
End: 2024-08-20
Payer: COMMERCIAL

## 2024-08-20 DIAGNOSIS — Z11.59 NEED FOR HEPATITIS C SCREENING TEST: ICD-10-CM

## 2024-08-20 DIAGNOSIS — E55.9 VITAMIN D DEFICIENCY: ICD-10-CM

## 2024-08-20 DIAGNOSIS — Z13.0 SCREENING FOR DEFICIENCY ANEMIA: ICD-10-CM

## 2024-08-20 DIAGNOSIS — E66.3 OVERWEIGHT (BMI 25.0-29.9): ICD-10-CM

## 2024-08-20 DIAGNOSIS — Z13.29 THYROID DISORDER SCREEN: ICD-10-CM

## 2024-08-20 LAB
25(OH)D3 SERPL-MCNC: 54 NG/ML (ref 30–100)
ALBUMIN SERPL BCP-MCNC: 4.3 G/DL (ref 3.2–4.9)
ALBUMIN/GLOB SERPL: 1.3 G/DL
ALP SERPL-CCNC: 96 U/L (ref 30–99)
ALT SERPL-CCNC: 29 U/L (ref 2–50)
ANION GAP SERPL CALC-SCNC: 13 MMOL/L (ref 7–16)
AST SERPL-CCNC: 24 U/L (ref 12–45)
BILIRUB SERPL-MCNC: 0.3 MG/DL (ref 0.1–1.5)
BUN SERPL-MCNC: 9 MG/DL (ref 8–22)
CALCIUM ALBUM COR SERPL-MCNC: 9 MG/DL (ref 8.5–10.5)
CALCIUM SERPL-MCNC: 9.2 MG/DL (ref 8.5–10.5)
CHLORIDE SERPL-SCNC: 104 MMOL/L (ref 96–112)
CHOLEST SERPL-MCNC: 259 MG/DL (ref 100–199)
CO2 SERPL-SCNC: 24 MMOL/L (ref 20–33)
CREAT SERPL-MCNC: 0.58 MG/DL (ref 0.5–1.4)
ERYTHROCYTE [DISTWIDTH] IN BLOOD BY AUTOMATED COUNT: 43.9 FL (ref 35.9–50)
EST. AVERAGE GLUCOSE BLD GHB EST-MCNC: 108 MG/DL
GFR SERPLBLD CREATININE-BSD FMLA CKD-EPI: 106 ML/MIN/1.73 M 2
GLOBULIN SER CALC-MCNC: 3.2 G/DL (ref 1.9–3.5)
GLUCOSE SERPL-MCNC: 88 MG/DL (ref 65–99)
HBA1C MFR BLD: 5.4 % (ref 4–5.6)
HCT VFR BLD AUTO: 45.5 % (ref 37–47)
HCV AB SER QL: NORMAL
HDLC SERPL-MCNC: 67 MG/DL
HGB BLD-MCNC: 14.7 G/DL (ref 12–16)
LDLC SERPL CALC-MCNC: 170 MG/DL
MCH RBC QN AUTO: 30.1 PG (ref 27–33)
MCHC RBC AUTO-ENTMCNC: 32.3 G/DL (ref 32.2–35.5)
MCV RBC AUTO: 93 FL (ref 81.4–97.8)
PLATELET # BLD AUTO: 249 K/UL (ref 164–446)
PMV BLD AUTO: 11.2 FL (ref 9–12.9)
POTASSIUM SERPL-SCNC: 4 MMOL/L (ref 3.6–5.5)
PROT SERPL-MCNC: 7.5 G/DL (ref 6–8.2)
RBC # BLD AUTO: 4.89 M/UL (ref 4.2–5.4)
SODIUM SERPL-SCNC: 141 MMOL/L (ref 135–145)
TRIGL SERPL-MCNC: 109 MG/DL (ref 0–149)
TSH SERPL-ACNC: 1.03 UIU/ML (ref 0.35–5.5)
WBC # BLD AUTO: 6.9 K/UL (ref 4.8–10.8)

## 2024-08-20 PROCEDURE — 80061 LIPID PANEL: CPT

## 2024-08-20 PROCEDURE — 80053 COMPREHEN METABOLIC PANEL: CPT

## 2024-08-20 PROCEDURE — 84443 ASSAY THYROID STIM HORMONE: CPT

## 2024-08-20 PROCEDURE — 36415 COLL VENOUS BLD VENIPUNCTURE: CPT

## 2024-08-20 PROCEDURE — 83036 HEMOGLOBIN GLYCOSYLATED A1C: CPT

## 2024-08-20 PROCEDURE — 85027 COMPLETE CBC AUTOMATED: CPT

## 2024-08-20 PROCEDURE — 86803 HEPATITIS C AB TEST: CPT

## 2024-08-20 PROCEDURE — 82306 VITAMIN D 25 HYDROXY: CPT

## 2024-09-16 ENCOUNTER — OFFICE VISIT (OUTPATIENT)
Dept: MEDICAL GROUP | Facility: PHYSICIAN GROUP | Age: 54
End: 2024-09-16
Payer: COMMERCIAL

## 2024-09-16 VITALS
BODY MASS INDEX: 29.1 KG/M2 | HEART RATE: 71 BPM | RESPIRATION RATE: 20 BRPM | OXYGEN SATURATION: 97 % | DIASTOLIC BLOOD PRESSURE: 80 MMHG | HEIGHT: 68 IN | WEIGHT: 192 LBS | TEMPERATURE: 97.5 F | SYSTOLIC BLOOD PRESSURE: 110 MMHG

## 2024-09-16 DIAGNOSIS — E66.3 OVERWEIGHT (BMI 25.0-29.9): ICD-10-CM

## 2024-09-16 DIAGNOSIS — E78.00 HIGH CHOLESTEROL: ICD-10-CM

## 2024-09-16 DIAGNOSIS — R10.84 GENERALIZED ABDOMINAL PAIN: ICD-10-CM

## 2024-09-16 DIAGNOSIS — B35.1 ONYCHOMYCOSIS: ICD-10-CM

## 2024-09-16 PROCEDURE — 3079F DIAST BP 80-89 MM HG: CPT

## 2024-09-16 PROCEDURE — 3074F SYST BP LT 130 MM HG: CPT

## 2024-09-16 PROCEDURE — 99214 OFFICE O/P EST MOD 30 MIN: CPT

## 2024-09-16 RX ORDER — CICLOPIROX 80 MG/ML
1 SOLUTION TOPICAL NIGHTLY
Qty: 6.6 ML | Refills: 2 | Status: SHIPPED | OUTPATIENT
Start: 2024-09-16

## 2024-09-16 ASSESSMENT — FIBROSIS 4 INDEX: FIB4 SCORE: 1

## 2024-09-16 ASSESSMENT — ENCOUNTER SYMPTOMS
ABDOMINAL PAIN: 1
ROS SKIN COMMENTS: TOENAIL FUNGUS

## 2024-09-16 NOTE — PROGRESS NOTES
"Subjective:     CC: Diagnoses of Overweight (BMI 25.0-29.9), High cholesterol, Onychomycosis, and Generalized abdominal pain were pertinent to this visit.    Pt presents today concerned about h.pylori infection and toenail fungus.     used using language line for [place] interpretation.    HPI:   Hortencia presents today with    Problem   Generalized Abdominal Pain   Overweight (Bmi 25.0-29.9)    3/11/24 226 lb, reports heaviest 235 lb.     High Cholesterol       ROS:  Review of Systems   Gastrointestinal:  Positive for abdominal pain (ongoing 7 months, associated with bad breath).   Skin:         Toenail fungus   All other systems reviewed and are negative.      Objective:     Exam:  /80 (BP Location: Left arm, Patient Position: Sitting, BP Cuff Size: Adult)   Pulse 71   Temp 36.4 °C (97.5 °F) (Temporal)   Resp 20   Ht 1.727 m (5' 8\")   Wt 87.1 kg (192 lb)   LMP 11/27/2012   SpO2 97%   BMI 29.19 kg/m²  Body mass index is 29.19 kg/m².    Physical Exam  Vitals reviewed.   Constitutional:       General: She is not in acute distress.     Appearance: Normal appearance. She is not ill-appearing.   HENT:      Head: Normocephalic and atraumatic.   Cardiovascular:      Rate and Rhythm: Normal rate.      Pulses: Normal pulses.   Pulmonary:      Effort: Pulmonary effort is normal. No respiratory distress.   Skin:     General: Skin is warm and dry.      Findings: No rash.   Neurological:      General: No focal deficit present.      Mental Status: She is alert and oriented to person, place, and time.   Psychiatric:         Mood and Affect: Mood normal.         Behavior: Behavior normal.         Labs:    Latest Reference Range & Units 08/20/24 08:34   WBC 4.8 - 10.8 K/uL 6.9   RBC 4.20 - 5.40 M/uL 4.89   Hemoglobin 12.0 - 16.0 g/dL 14.7   Hematocrit 37.0 - 47.0 % 45.5   MCV 81.4 - 97.8 fL 93.0   MCH 27.0 - 33.0 pg 30.1   MCHC 32.2 - 35.5 g/dL 32.3   RDW 35.9 - 50.0 fL 43.9   Platelet Count 164 - 446 K/uL " "249   MPV 9.0 - 12.9 fL 11.2   Sodium 135 - 145 mmol/L 141   Potassium 3.6 - 5.5 mmol/L 4.0   Chloride 96 - 112 mmol/L 104   Co2 20 - 33 mmol/L 24   Anion Gap 7.0 - 16.0  13.0   Glucose 65 - 99 mg/dL 88   Bun 8 - 22 mg/dL 9   Creatinine 0.50 - 1.40 mg/dL 0.58   GFR (CKD-EPI) >60 mL/min/1.73 m 2 106   Calcium 8.5 - 10.5 mg/dL 9.2   Correct Calcium 8.5 - 10.5 mg/dL 9.0   AST(SGOT) 12 - 45 U/L 24   ALT(SGPT) 2 - 50 U/L 29   Alkaline Phosphatase 30 - 99 U/L 96   Total Bilirubin 0.1 - 1.5 mg/dL 0.3   Albumin 3.2 - 4.9 g/dL 4.3   Total Protein 6.0 - 8.2 g/dL 7.5   Globulin 1.9 - 3.5 g/dL 3.2   A-G Ratio g/dL 1.3   Glycohemoglobin 4.0 - 5.6 % 5.4   Estim. Avg Glu mg/dL 108   Cholesterol,Tot 100 - 199 mg/dL 259 (H)   Triglycerides 0 - 149 mg/dL 109   HDL >=40 mg/dL 67   LDL <100 mg/dL 170 (H)   25-Hydroxy   Vitamin D 25 30 - 100 ng/mL 54   TSH 0.350 - 5.500 uIU/mL 1.030   Hepatitis C Antibody Non-Reactive  Non-Reactive   (H): Data is abnormally high    Assessment & Plan:     54 y.o. female with the following -     Assessment & Plan  Overweight (BMI 25.0-29.9)  Chronic, improving. Continue phentermine 30 mg daily, healthy lifestyle recommendations.         High cholesterol  Chronic, unstable. Discussed healthy lifestyle recommendations.   The 10-year ASCVD risk score (Raheem DK, et al., 2019) is: 1.8%  Reports she is eating 4 eggs daily cooked in coconut oil.  Discussed reducing amount of eggs, alternating cooking oil          Onychomycosis    Orders:    ciclopirox (PENLAC) 8 % solution; Apply 1 Application topically every evening. Apply to affected toenail(s) and adjacent skin once daily in combination with weekly nail trimming and periodic nail debridement. Remove with alcohol every 7 days; continue therapy until nail clearance (maximum duration: 48 weeks)    Generalized abdominal pain  Chronic, unstable. Reports ongoing abdominal pain, feels like her stomach feels \"hot\", bad breath. Requesting h.pylori testing, reports " her sister had this, and is on medication for this.      Orders:    H.PYLORI STOOL ANTIGEN; Future       Patient was educated in proper administration of medication(s) ordered today including safety, possible SE, risks, benefits, rationale and alternatives to therapy.   Supportive care, differential diagnoses, and indications for immediate follow-up discussed with patient.    Pathogenesis of diagnosis discussed including typical length and natural progression.    Instructed to return to clinic or nearest emergency department for any change in condition, further concerns, or worsening of symptoms.  Patient states understanding of the plan of care and discharge instructions.    Return if symptoms worsen or fail to improve.    Please note that this dictation was created using voice recognition software. I have made every reasonable attempt to correct obvious errors, but I expect that there are errors of grammar and possibly content that I did not discover before finalizing the note.

## 2024-09-16 NOTE — ASSESSMENT & PLAN NOTE
Orders:    ciclopirox (PENLAC) 8 % solution; Apply 1 Application topically every evening. Apply to affected toenail(s) and adjacent skin once daily in combination with weekly nail trimming and periodic nail debridement. Remove with alcohol every 7 days; continue therapy until nail clearance (maximum duration: 48 weeks)

## 2024-09-16 NOTE — ASSESSMENT & PLAN NOTE
Chronic, unstable. Discussed healthy lifestyle recommendations.   The 10-year ASCVD risk score (Raheem CHEUNG, et al., 2019) is: 1.8%  Reports she is eating 4 eggs daily cooked in coconut oil.  Discussed reducing amount of eggs, alternating cooking oil

## 2024-09-16 NOTE — ASSESSMENT & PLAN NOTE
"Chronic, unstable. Reports ongoing abdominal pain, feels like her stomach feels \"hot\", bad breath. Requesting h.pylori testing, reports her sister had this, and is on medication for this.      Orders:    H.PYLORI STOOL ANTIGEN; Future    "

## 2024-09-17 ENCOUNTER — HOSPITAL ENCOUNTER (OUTPATIENT)
Facility: MEDICAL CENTER | Age: 54
End: 2024-09-17

## 2024-09-17 DIAGNOSIS — R10.84 GENERALIZED ABDOMINAL PAIN: ICD-10-CM

## 2024-09-17 LAB — H PYLORI AG STL QL IA: NOT DETECTED

## 2024-09-17 PROCEDURE — 87338 HPYLORI STOOL AG IA: CPT

## 2025-04-15 ENCOUNTER — OFFICE VISIT (OUTPATIENT)
Dept: MEDICAL GROUP | Facility: PHYSICIAN GROUP | Age: 55
End: 2025-04-15
Payer: COMMERCIAL

## 2025-04-15 VITALS
DIASTOLIC BLOOD PRESSURE: 60 MMHG | BODY MASS INDEX: 27.31 KG/M2 | TEMPERATURE: 97.8 F | HEIGHT: 68 IN | RESPIRATION RATE: 16 BRPM | HEART RATE: 76 BPM | SYSTOLIC BLOOD PRESSURE: 118 MMHG | OXYGEN SATURATION: 93 % | WEIGHT: 180.2 LBS

## 2025-04-15 DIAGNOSIS — Z13.6 SCREENING FOR CARDIOVASCULAR CONDITION: ICD-10-CM

## 2025-04-15 DIAGNOSIS — E55.9 VITAMIN D DEFICIENCY: ICD-10-CM

## 2025-04-15 DIAGNOSIS — B35.1 ONYCHOMYCOSIS: ICD-10-CM

## 2025-04-15 DIAGNOSIS — G89.29 CHRONIC LEFT-SIDED LOW BACK PAIN WITHOUT SCIATICA: ICD-10-CM

## 2025-04-15 DIAGNOSIS — F31.9 BIPOLAR AFFECTIVE DISORDER, REMISSION STATUS UNSPECIFIED (HCC): ICD-10-CM

## 2025-04-15 DIAGNOSIS — Z13.0 SCREENING FOR DEFICIENCY ANEMIA: ICD-10-CM

## 2025-04-15 DIAGNOSIS — Z13.29 THYROID DISORDER SCREEN: ICD-10-CM

## 2025-04-15 DIAGNOSIS — Z00.00 WELLNESS EXAMINATION: ICD-10-CM

## 2025-04-15 DIAGNOSIS — M54.6 CHRONIC BILATERAL THORACIC BACK PAIN: ICD-10-CM

## 2025-04-15 DIAGNOSIS — Z11.1 SCREENING-PULMONARY TB: ICD-10-CM

## 2025-04-15 DIAGNOSIS — Z02.89 ENCOUNTER FOR COMPLETION OF FORM WITH PATIENT: ICD-10-CM

## 2025-04-15 DIAGNOSIS — Z23 NEED FOR VACCINATION: ICD-10-CM

## 2025-04-15 DIAGNOSIS — M54.50 CHRONIC LEFT-SIDED LOW BACK PAIN WITHOUT SCIATICA: ICD-10-CM

## 2025-04-15 DIAGNOSIS — Z12.31 ENCOUNTER FOR SCREENING MAMMOGRAM FOR BREAST CANCER: ICD-10-CM

## 2025-04-15 DIAGNOSIS — G89.29 CHRONIC BILATERAL THORACIC BACK PAIN: ICD-10-CM

## 2025-04-15 PROBLEM — Z87.898 HISTORY OF SEIZURE: Status: RESOLVED | Noted: 2018-06-20 | Resolved: 2025-04-15

## 2025-04-15 PROBLEM — G44.52 NEW DAILY PERSISTENT HEADACHE: Status: RESOLVED | Noted: 2018-06-20 | Resolved: 2025-04-15

## 2025-04-15 PROBLEM — M62.89 PELVIC FLOOR DYSFUNCTION IN FEMALE: Status: RESOLVED | Noted: 2024-08-19 | Resolved: 2025-04-15

## 2025-04-15 PROBLEM — R22.1 MASS OF LEFT SIDE OF NECK: Status: RESOLVED | Noted: 2017-09-21 | Resolved: 2025-04-15

## 2025-04-15 PROBLEM — R10.84 GENERALIZED ABDOMINAL PAIN: Status: RESOLVED | Noted: 2024-09-16 | Resolved: 2025-04-15

## 2025-04-15 PROBLEM — L29.2 VULVOVAGINAL ITCHING: Status: RESOLVED | Noted: 2023-09-26 | Resolved: 2025-04-15

## 2025-04-15 PROCEDURE — 90677 PCV20 VACCINE IM: CPT

## 2025-04-15 PROCEDURE — 3074F SYST BP LT 130 MM HG: CPT

## 2025-04-15 PROCEDURE — 99214 OFFICE O/P EST MOD 30 MIN: CPT | Mod: 25

## 2025-04-15 PROCEDURE — 3078F DIAST BP <80 MM HG: CPT

## 2025-04-15 PROCEDURE — 90471 IMMUNIZATION ADMIN: CPT

## 2025-04-15 RX ORDER — CALCIUM CARBONATE 500(1250)
TABLET,CHEWABLE ORAL
COMMUNITY

## 2025-04-15 RX ORDER — CICLOPIROX 80 MG/ML
1 SOLUTION TOPICAL NIGHTLY
Qty: 6.6 ML | Refills: 2 | Status: SHIPPED | OUTPATIENT
Start: 2025-04-15

## 2025-04-15 RX ORDER — MELOXICAM 15 MG/1
15 TABLET ORAL
Qty: 90 TABLET | Refills: 2 | Status: SHIPPED | OUTPATIENT
Start: 2025-04-15

## 2025-04-15 RX ORDER — ESTERIFIED ESTROGEN AND METHYLTESTOSTERONE .625; 1.25 MG/1; MG/1
TABLET ORAL
COMMUNITY
End: 2025-04-15

## 2025-04-15 RX ORDER — UBIDECARENONE 75 MG
CAPSULE ORAL
COMMUNITY

## 2025-04-15 ASSESSMENT — FIBROSIS 4 INDEX: FIB4 SCORE: 1

## 2025-04-15 ASSESSMENT — PATIENT HEALTH QUESTIONNAIRE - PHQ9: CLINICAL INTERPRETATION OF PHQ2 SCORE: 0

## 2025-04-15 ASSESSMENT — ENCOUNTER SYMPTOMS: BACK PAIN: 1
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
## 2025-04-15 NOTE — ASSESSMENT & PLAN NOTE
Chronic, improving. Reports improved with weight loss. Using meloxicam 15 mg as needed, tizanidine as needed for pain exacerbation.

## 2025-04-16 ENCOUNTER — HOSPITAL ENCOUNTER (OUTPATIENT)
Dept: LAB | Facility: MEDICAL CENTER | Age: 55
End: 2025-04-16
Payer: COMMERCIAL

## 2025-04-16 DIAGNOSIS — Z11.1 SCREENING-PULMONARY TB: ICD-10-CM

## 2025-04-16 PROCEDURE — 86480 TB TEST CELL IMMUN MEASURE: CPT

## 2025-04-16 PROCEDURE — 36415 COLL VENOUS BLD VENIPUNCTURE: CPT

## 2025-04-18 ENCOUNTER — RESULTS FOLLOW-UP (OUTPATIENT)
Dept: MEDICAL GROUP | Facility: PHYSICIAN GROUP | Age: 55
End: 2025-04-18

## 2025-04-18 LAB
GAMMA INTERFERON BACKGROUND BLD IA-ACNC: 0.15 IU/ML
M TB IFN-G BLD-IMP: NEGATIVE
M TB IFN-G CD4+ BCKGRND COR BLD-ACNC: -0.03 IU/ML
MITOGEN IGNF BCKGRD COR BLD-ACNC: 7.96 IU/ML
QFT TB2 - NIL TBQ2: -0.03 IU/ML

## 2025-06-19 ENCOUNTER — HOSPITAL ENCOUNTER (OUTPATIENT)
Dept: LAB | Facility: MEDICAL CENTER | Age: 55
End: 2025-06-19
Payer: COMMERCIAL

## 2025-06-19 DIAGNOSIS — Z00.00 WELLNESS EXAMINATION: ICD-10-CM

## 2025-06-19 DIAGNOSIS — Z13.29 THYROID DISORDER SCREEN: ICD-10-CM

## 2025-06-19 DIAGNOSIS — Z13.6 SCREENING FOR CARDIOVASCULAR CONDITION: ICD-10-CM

## 2025-06-19 DIAGNOSIS — Z13.0 SCREENING FOR DEFICIENCY ANEMIA: ICD-10-CM

## 2025-06-19 DIAGNOSIS — E55.9 VITAMIN D DEFICIENCY: ICD-10-CM

## 2025-06-19 LAB
25(OH)D3 SERPL-MCNC: 70 NG/ML (ref 30–100)
ALBUMIN SERPL BCP-MCNC: 4.2 G/DL (ref 3.2–4.9)
ALBUMIN/GLOB SERPL: 1.1 G/DL
ALP SERPL-CCNC: 81 U/L (ref 30–99)
ALT SERPL-CCNC: 21 U/L (ref 2–50)
ANION GAP SERPL CALC-SCNC: 13 MMOL/L (ref 7–16)
AST SERPL-CCNC: 24 U/L (ref 12–45)
BILIRUB SERPL-MCNC: 0.5 MG/DL (ref 0.1–1.5)
BUN SERPL-MCNC: 13 MG/DL (ref 8–22)
CALCIUM ALBUM COR SERPL-MCNC: 9.3 MG/DL (ref 8.5–10.5)
CALCIUM SERPL-MCNC: 9.5 MG/DL (ref 8.5–10.5)
CHLORIDE SERPL-SCNC: 106 MMOL/L (ref 96–112)
CHOLEST SERPL-MCNC: 210 MG/DL (ref 100–199)
CO2 SERPL-SCNC: 23 MMOL/L (ref 20–33)
CREAT SERPL-MCNC: 0.68 MG/DL (ref 0.5–1.4)
ERYTHROCYTE [DISTWIDTH] IN BLOOD BY AUTOMATED COUNT: 41.8 FL (ref 35.9–50)
EST. AVERAGE GLUCOSE BLD GHB EST-MCNC: 111 MG/DL
GFR SERPLBLD CREATININE-BSD FMLA CKD-EPI: 102 ML/MIN/1.73 M 2
GLOBULIN SER CALC-MCNC: 3.8 G/DL (ref 1.9–3.5)
GLUCOSE SERPL-MCNC: 89 MG/DL (ref 65–99)
HBA1C MFR BLD: 5.5 % (ref 4–5.6)
HCT VFR BLD AUTO: 41.3 % (ref 37–47)
HDLC SERPL-MCNC: 57 MG/DL
HGB BLD-MCNC: 13.3 G/DL (ref 12–16)
LDLC SERPL CALC-MCNC: 138 MG/DL
MCH RBC QN AUTO: 29.8 PG (ref 27–33)
MCHC RBC AUTO-ENTMCNC: 32.2 G/DL (ref 32.2–35.5)
MCV RBC AUTO: 92.4 FL (ref 81.4–97.8)
PLATELET # BLD AUTO: 345 K/UL (ref 164–446)
PMV BLD AUTO: 10.5 FL (ref 9–12.9)
POTASSIUM SERPL-SCNC: 4.5 MMOL/L (ref 3.6–5.5)
PROT SERPL-MCNC: 8 G/DL (ref 6–8.2)
RBC # BLD AUTO: 4.47 M/UL (ref 4.2–5.4)
SODIUM SERPL-SCNC: 142 MMOL/L (ref 135–145)
TRIGL SERPL-MCNC: 73 MG/DL (ref 0–149)
TSH SERPL-ACNC: 0.43 UIU/ML (ref 0.38–5.33)
WBC # BLD AUTO: 7.3 K/UL (ref 4.8–10.8)

## 2025-06-19 PROCEDURE — 82306 VITAMIN D 25 HYDROXY: CPT

## 2025-06-19 PROCEDURE — 84443 ASSAY THYROID STIM HORMONE: CPT

## 2025-06-19 PROCEDURE — 80053 COMPREHEN METABOLIC PANEL: CPT

## 2025-06-19 PROCEDURE — 80061 LIPID PANEL: CPT

## 2025-06-19 PROCEDURE — 85027 COMPLETE CBC AUTOMATED: CPT

## 2025-06-19 PROCEDURE — 36415 COLL VENOUS BLD VENIPUNCTURE: CPT

## 2025-06-19 PROCEDURE — 83036 HEMOGLOBIN GLYCOSYLATED A1C: CPT

## 2025-06-20 ENCOUNTER — RESULTS FOLLOW-UP (OUTPATIENT)
Dept: MEDICAL GROUP | Facility: PHYSICIAN GROUP | Age: 55
End: 2025-06-20

## 2025-06-25 ENCOUNTER — OFFICE VISIT (OUTPATIENT)
Dept: MEDICAL GROUP | Facility: PHYSICIAN GROUP | Age: 55
End: 2025-06-25
Payer: COMMERCIAL

## 2025-06-25 VITALS
OXYGEN SATURATION: 96 % | SYSTOLIC BLOOD PRESSURE: 108 MMHG | BODY MASS INDEX: 26.22 KG/M2 | TEMPERATURE: 97.4 F | RESPIRATION RATE: 20 BRPM | WEIGHT: 173 LBS | HEART RATE: 75 BPM | HEIGHT: 68 IN | DIASTOLIC BLOOD PRESSURE: 64 MMHG

## 2025-06-25 DIAGNOSIS — Z00.00 WELLNESS EXAMINATION: ICD-10-CM

## 2025-06-25 DIAGNOSIS — E66.3 OVERWEIGHT (BMI 25.0-29.9): Primary | ICD-10-CM

## 2025-06-25 DIAGNOSIS — E78.00 HIGH CHOLESTEROL: ICD-10-CM

## 2025-06-25 DIAGNOSIS — Z12.31 ENCOUNTER FOR SCREENING MAMMOGRAM FOR MALIGNANT NEOPLASM OF BREAST: ICD-10-CM

## 2025-06-25 DIAGNOSIS — B35.1 ONYCHOMYCOSIS: ICD-10-CM

## 2025-06-25 DIAGNOSIS — R05.1 ACUTE COUGH: ICD-10-CM

## 2025-06-25 RX ORDER — ALBUTEROL SULFATE 90 UG/1
2 INHALANT RESPIRATORY (INHALATION) EVERY 4 HOURS PRN
Qty: 1 EACH | Refills: 0 | Status: SHIPPED | OUTPATIENT
Start: 2025-06-25

## 2025-06-25 ASSESSMENT — FIBROSIS 4 INDEX: FIB4 SCORE: 0.85

## 2025-06-25 NOTE — PROGRESS NOTES
Verbal consent was acquired by the patient to use [employer] ambient listening note generation during this visit     Subjective:     CC: The primary encounter diagnosis was Overweight (BMI 25.0-29.9). Diagnoses of High cholesterol, Encounter for screening mammogram for malignant neoplasm of breast, Wellness examination, Acute cough, and Onychomycosis were also pertinent to this visit.    HPI:   Hortencia presents today with    History of Present Illness  The patient presents for evaluation of back pain, chest heaviness, and health maintenance. Accompanied by a  (ID #[id number]).    Back Pain  - Manages back pain with medication and a vest for warmth  - Worsens in cold weather    Chest Heaviness  - Concerned about lung health due to recent illness causing chest heaviness  - Recovered but continues to experience mild chest heaviness and cough  - No increased mucus production  - Mentions tingling sensation when breathing deeply, triggering cough  - Believes an inhaler might be beneficial    Health Maintenance  - Reports general well-being attributed to recent weight loss through healthier eating and regular walking  - Reduced intake of sugar, bread, and tortillas; increased vegetables, water, and black coffee (2-3 cups daily)  - Maintains good oral hygiene with regular dental check-ups  - Normal bowel and bladder functions  - No recent falls or allergy symptoms  - Cholesterol levels have decreased since last check, likely due to exercise and weight loss  - Recent CT scans show no atherosclerosis or plaque buildup  - Reports improvement in nail fungus condition    Supplemental information: Accompanied by a  (ID #[id number]).    Current Medications[1]    Problem   Onychomycosis   Overweight (Bmi 25.0-29.9)    3/11/24 226 lb, reports heaviest 235 lb.     High Cholesterol       Health Maintenance: Completed  Cancer screening:   Colorectal cancer screening: due 2029  Cervical cancer screening: due 11/30/25  Breast  "cancer screening: due, ordered  Infectious disease screening/Immunizaitons  -STI screening: declines  Practices safe sex.    -Immunizaitons:   Influenza: recommended  Tetanus: up-to-date: 2035  Anticipatory Guidance:  Elicits she is eating a variety of fresh veggies/fruits, lean meats,   limited fast food/junk food/soda  Exercise: recommended 150 minutes/week; walking  Substance Abuse: n/a  Safe in relationship.   Seat belts, bike helmet, gun safety discussed.  Sun protection used, dental home established        ROS: negative except for as documented below, in HPI, Assessment and Plan  Review of Systems   Respiratory:          Reports URI 2 weeks ago with cough and chest heaviness, resolving.    All other systems reviewed and are negative.      Objective:     Exam:  /64 (BP Location: Right arm, Patient Position: Sitting, BP Cuff Size: Adult)   Pulse 75   Temp 36.3 °C (97.4 °F) (Temporal)   Resp 20   Ht 1.727 m (5' 8\")   Wt 78.5 kg (173 lb)   LMP 11/27/2012   SpO2 96%   BMI 26.30 kg/m²  Body mass index is 26.3 kg/m².    Physical Exam  Vitals reviewed.   Constitutional:       General: She is not in acute distress.     Appearance: Normal appearance. She is well-groomed. She is not ill-appearing.   HENT:      Head: Normocephalic and atraumatic.      Right Ear: Tympanic membrane, ear canal and external ear normal.      Left Ear: Tympanic membrane, ear canal and external ear normal.      Nose: Nose normal.      Mouth/Throat:      Mouth: Mucous membranes are moist.      Pharynx: Oropharynx is clear.   Eyes:      Extraocular Movements: Extraocular movements intact.      Conjunctiva/sclera: Conjunctivae normal.      Pupils: Pupils are equal, round, and reactive to light.   Neck:      Thyroid: No thyromegaly.      Trachea: Trachea normal.   Cardiovascular:      Rate and Rhythm: Normal rate and regular rhythm.      Pulses: Normal pulses.      Heart sounds: Normal heart sounds. No murmur heard.  Pulmonary: "      Effort: Pulmonary effort is normal. No respiratory distress.      Breath sounds: Normal breath sounds. No wheezing.   Abdominal:      General: Bowel sounds are normal.   Musculoskeletal:         General: No swelling, tenderness or deformity. Normal range of motion.      Cervical back: Full passive range of motion without pain.   Lymphadenopathy:      Cervical: No cervical adenopathy.   Skin:     General: Skin is warm and dry.      Capillary Refill: Capillary refill takes less than 2 seconds.   Neurological:      General: No focal deficit present.      Mental Status: She is alert and oriented to person, place, and time. Mental status is at baseline.      Cranial Nerves: No cranial nerve deficit.      Sensory: No sensory deficit.      Motor: No weakness.   Psychiatric:         Mood and Affect: Mood normal.         Behavior: Behavior normal.         Labs:    Latest Reference Range & Units 06/19/25 10:26   WBC 4.8 - 10.8 K/uL 7.3   RBC 4.20 - 5.40 M/uL 4.47   Hemoglobin 12.0 - 16.0 g/dL 13.3   Hematocrit 37.0 - 47.0 % 41.3   MCV 81.4 - 97.8 fL 92.4   MCH 27.0 - 33.0 pg 29.8   MCHC 32.2 - 35.5 g/dL 32.2   RDW 35.9 - 50.0 fL 41.8   Platelet Count 164 - 446 K/uL 345   MPV 9.0 - 12.9 fL 10.5   Sodium 135 - 145 mmol/L 142   Potassium 3.6 - 5.5 mmol/L 4.5   Chloride 96 - 112 mmol/L 106   Co2 20 - 33 mmol/L 23   Anion Gap 7.0 - 16.0  13.0   Glucose 65 - 99 mg/dL 89   Bun 8 - 22 mg/dL 13   Creatinine 0.50 - 1.40 mg/dL 0.68   GFR (CKD-EPI) >60 mL/min/1.73 m 2 102   Calcium 8.5 - 10.5 mg/dL 9.5   Correct Calcium 8.5 - 10.5 mg/dL 9.3   AST(SGOT) 12 - 45 U/L 24   ALT(SGPT) 2 - 50 U/L 21   Alkaline Phosphatase 30 - 99 U/L 81   Total Bilirubin 0.1 - 1.5 mg/dL 0.5   Albumin 3.2 - 4.9 g/dL 4.2   Total Protein 6.0 - 8.2 g/dL 8.0   Globulin 1.9 - 3.5 g/dL 3.8 (H)   A-G Ratio g/dL 1.1   Glycohemoglobin 4.0 - 5.6 % 5.5   Estim. Avg Glu mg/dL 111   Cholesterol,Tot 100 - 199 mg/dL 210 (H)   Triglycerides 0 - 149 mg/dL 73   HDL >=40  mg/dL 57   LDL <100 mg/dL 138 (H)   25-Hydroxy   Vitamin D 25 30 - 100 ng/mL 70   TSH 0.380 - 5.330 uIU/mL 0.428   (H): Data is abnormally high    Assessment & Plan:     54 y.o. female with the following -     Assessment & Plan  1. Back pain: Chronic.  - Worsens with cold weather. Uses vest for warmth and medication as needed.  - Continue current management.    2. Chest heaviness: Post-illness.  - Symptoms improved but mild heaviness persists. Lungs clear.  - Prescribed inhaler with instructions.    3. Hypercholesterolemia: Stable.  - Levels decreased, likely due to exercise and weight loss. No atherosclerosis or plaque buildup in CT scans.  - Continue lifestyle modifications.    4. Health maintenance.  - Labs satisfactory except slightly elevated cholesterol.  - Last mammogram showed dense breast tissue.  - Ordered annual mammogram, consider ultrasound for dense breast tissue.    Follow-up  - Due for Pap smear in November 2025.    Problem List Items Addressed This Visit       High cholesterol    Chronic, improving. Discussed healthy lifestyle recommendations.   The 10-year ASCVD risk score ([place] DK, et al., 2019) is: 1.6%             Overweight (BMI 25.0-29.9) - Primary    Chronic, improving. Improving nutrition, walking daily.         Onychomycosis    Reports symptoms improving with penlac daily application .continue this.           Other Visit Diagnoses         Encounter for screening mammogram for malignant neoplasm of breast        Relevant Orders    MA-SCREENING MAMMO BILAT W/TOMOSYNTHESIS W/CAD      Wellness examination          Acute cough        Relevant Medications    albuterol 108 (90 Base) MCG/ACT Aero Soln inhalation aerosol          Patient was educated in proper administration of medication(s) ordered today including safety, possible SE, risks, benefits, rationale and alternatives to therapy.   Supportive care, differential diagnoses, and indications for immediate follow-up discussed with patient.     Pathogenesis of diagnosis discussed including typical length and natural progression.    Instructed to return to clinic or nearest emergency department for any change in condition, further concerns, or worsening of symptoms.  Patient states understanding of the plan of care and discharge instructions.    Return in about 1 year (around 6/25/2026), or if symptoms worsen or fail to improve.    Please note that this dictation was created using voice recognition software. I have made every reasonable attempt to correct obvious errors, but I expect that there are errors of grammar and possibly content that I did not discover before finalizing the note.             [1]   Current Outpatient Medications   Medication Sig Dispense Refill    albuterol 108 (90 Base) MCG/ACT Aero Soln inhalation aerosol Inhale 2 Puffs every four hours as needed (cough). 1 Each 0    calcium carbonate (OS-PHILLIP) 1250 (500 Ca) MG chewable tablet 0      Omega 3-5-6-7-9 Fatty Acids (COMPLETE OMEGA PO) 0      Cholecalciferol 75515 UNIT Cap 0      cyanocobalamin (VITAMIN B-12) 100 MCG Tab 0      ciclopirox (PENLAC) 8 % solution Apply 1 Application topically every evening. Apply to affected toenail(s) and adjacent skin once daily in combination with weekly nail trimming and periodic nail debridement. Remove with alcohol every 7 days; continue therapy until nail clearance (maximum duration: 48 weeks) 6.6 mL 2    tizanidine (ZANAFLEX) 4 MG Tab Take 1 Tablet by mouth every 6 hours as needed (moderate to severe pain, muscle spasm). 30 Tablet 5    meloxicam (MOBIC) 15 MG tablet Take 1 Tablet by mouth every day. 90 Tablet 2    Cholecalciferol (VITAMIN D) 2000 UNIT Tab Take 4,000 Units by mouth every day.      MAGNESIUM PO Take 1-2 Tabs by mouth every day.       No current facility-administered medications for this visit.

## 2025-06-25 NOTE — ASSESSMENT & PLAN NOTE
Chronic, improving. Discussed healthy lifestyle recommendations.   The 10-year ASCVD risk score (Raheem CHEUNG, et al., 2019) is: 1.6%

## 2025-07-01 ENCOUNTER — APPOINTMENT (OUTPATIENT)
Dept: MEDICAL GROUP | Facility: PHYSICIAN GROUP | Age: 55
End: 2025-07-01
Payer: COMMERCIAL

## 2025-07-29 DIAGNOSIS — R05.1 ACUTE COUGH: ICD-10-CM

## 2025-07-29 RX ORDER — ALBUTEROL SULFATE 90 UG/1
2 INHALANT RESPIRATORY (INHALATION) EVERY 4 HOURS PRN
Qty: 6.7 EACH | Refills: 3 | Status: SHIPPED | OUTPATIENT
Start: 2025-07-29

## 2025-08-27 ENCOUNTER — HOSPITAL ENCOUNTER (OUTPATIENT)
Dept: RADIOLOGY | Facility: MEDICAL CENTER | Age: 55
End: 2025-08-27
Payer: COMMERCIAL

## 2025-08-27 VITALS — BODY MASS INDEX: 28.79 KG/M2 | HEIGHT: 68 IN | WEIGHT: 190 LBS

## 2025-08-27 DIAGNOSIS — Z12.31 ENCOUNTER FOR SCREENING MAMMOGRAM FOR MALIGNANT NEOPLASM OF BREAST: ICD-10-CM

## 2025-08-27 PROCEDURE — 77067 SCR MAMMO BI INCL CAD: CPT

## 2025-08-27 ASSESSMENT — FIBROSIS 4 INDEX: FIB4 SCORE: 0.87
